# Patient Record
Sex: MALE | Race: WHITE | NOT HISPANIC OR LATINO
[De-identification: names, ages, dates, MRNs, and addresses within clinical notes are randomized per-mention and may not be internally consistent; named-entity substitution may affect disease eponyms.]

---

## 2014-11-14 RX ORDER — METOPROLOL TARTRATE 50 MG
1 TABLET ORAL
Qty: 0 | Refills: 0 | COMMUNITY
Start: 2014-11-14

## 2017-05-02 ENCOUNTER — CLINICAL ADVICE (OUTPATIENT)
Age: 72
End: 2017-05-02

## 2017-05-09 ENCOUNTER — APPOINTMENT (OUTPATIENT)
Dept: CT IMAGING | Facility: CLINIC | Age: 72
End: 2017-05-09

## 2017-05-09 ENCOUNTER — OUTPATIENT (OUTPATIENT)
Dept: OUTPATIENT SERVICES | Facility: HOSPITAL | Age: 72
LOS: 1 days | End: 2017-05-09
Payer: MEDICARE

## 2017-05-09 DIAGNOSIS — Z00.8 ENCOUNTER FOR OTHER GENERAL EXAMINATION: ICD-10-CM

## 2017-05-09 DIAGNOSIS — Z93.0 TRACHEOSTOMY STATUS: Chronic | ICD-10-CM

## 2017-05-09 DIAGNOSIS — I71.01 DISSECTION OF THORACIC AORTA: Chronic | ICD-10-CM

## 2017-05-09 DIAGNOSIS — Z93.2 ILEOSTOMY STATUS: Chronic | ICD-10-CM

## 2017-05-09 PROCEDURE — 71275 CT ANGIOGRAPHY CHEST: CPT

## 2017-05-09 PROCEDURE — 82565 ASSAY OF CREATININE: CPT

## 2017-05-09 PROCEDURE — 74177 CT ABD & PELVIS W/CONTRAST: CPT

## 2017-05-17 DIAGNOSIS — K46.9 UNSPECIFIED ABDOMINAL HERNIA WITHOUT OBSTRUCTION OR GANGRENE: ICD-10-CM

## 2017-05-17 DIAGNOSIS — Z87.74 PERSONAL HISTORY OF (CORRECTED) CONGENITAL MALFORMATIONS OF HEART AND CIRCULATORY SYSTEM: ICD-10-CM

## 2017-06-19 ENCOUNTER — INPATIENT (INPATIENT)
Facility: HOSPITAL | Age: 72
LOS: 2 days | Discharge: HOME | DRG: 287 | End: 2017-06-22
Attending: INTERNAL MEDICINE | Admitting: INTERNAL MEDICINE
Payer: MEDICARE

## 2017-06-19 VITALS
RESPIRATION RATE: 18 BRPM | SYSTOLIC BLOOD PRESSURE: 151 MMHG | WEIGHT: 160.06 LBS | HEART RATE: 77 BPM | TEMPERATURE: 98 F | HEIGHT: 68 IN | OXYGEN SATURATION: 98 % | DIASTOLIC BLOOD PRESSURE: 85 MMHG

## 2017-06-19 DIAGNOSIS — R07.9 CHEST PAIN, UNSPECIFIED: ICD-10-CM

## 2017-06-19 DIAGNOSIS — I71.3 ABDOMINAL AORTIC ANEURYSM, RUPTURED: ICD-10-CM

## 2017-06-19 DIAGNOSIS — Z93.2 ILEOSTOMY STATUS: Chronic | ICD-10-CM

## 2017-06-19 DIAGNOSIS — I71.01 DISSECTION OF THORACIC AORTA: Chronic | ICD-10-CM

## 2017-06-19 DIAGNOSIS — Z93.0 TRACHEOSTOMY STATUS: Chronic | ICD-10-CM

## 2017-06-19 LAB
ALBUMIN SERPL ELPH-MCNC: 3.5 G/DL — SIGNIFICANT CHANGE UP (ref 3.3–5)
ALP SERPL-CCNC: 73 U/L — SIGNIFICANT CHANGE UP (ref 40–120)
ALT FLD-CCNC: 13 U/L RC — SIGNIFICANT CHANGE UP (ref 10–45)
ANION GAP SERPL CALC-SCNC: 13 MMOL/L — SIGNIFICANT CHANGE UP (ref 5–17)
APPEARANCE UR: CLEAR — SIGNIFICANT CHANGE UP
AST SERPL-CCNC: 11 U/L — SIGNIFICANT CHANGE UP (ref 10–40)
BASOPHILS # BLD AUTO: 0 K/UL — SIGNIFICANT CHANGE UP (ref 0–0.2)
BASOPHILS NFR BLD AUTO: 0.2 % — SIGNIFICANT CHANGE UP (ref 0–2)
BILIRUB SERPL-MCNC: 0.4 MG/DL — SIGNIFICANT CHANGE UP (ref 0.2–1.2)
BILIRUB UR-MCNC: NEGATIVE — SIGNIFICANT CHANGE UP
BUN SERPL-MCNC: 25 MG/DL — HIGH (ref 7–23)
CALCIUM SERPL-MCNC: 8.9 MG/DL — SIGNIFICANT CHANGE UP (ref 8.4–10.5)
CHLORIDE SERPL-SCNC: 106 MMOL/L — SIGNIFICANT CHANGE UP (ref 96–108)
CK MB BLD-MCNC: 8 % — HIGH (ref 0–3.5)
CK MB BLD-MCNC: 9.1 % — HIGH (ref 0–3.5)
CK MB CFR SERPL CALC: 3.1 NG/ML — SIGNIFICANT CHANGE UP (ref 0–6.7)
CK MB CFR SERPL CALC: 3.5 NG/ML — SIGNIFICANT CHANGE UP (ref 0–6.7)
CK SERPL-CCNC: 34 U/L — SIGNIFICANT CHANGE UP (ref 30–200)
CK SERPL-CCNC: 44 U/L — SIGNIFICANT CHANGE UP (ref 30–200)
CO2 SERPL-SCNC: 23 MMOL/L — SIGNIFICANT CHANGE UP (ref 22–31)
COLOR SPEC: YELLOW — SIGNIFICANT CHANGE UP
CREAT SERPL-MCNC: 1.21 MG/DL — SIGNIFICANT CHANGE UP (ref 0.5–1.3)
DIFF PNL FLD: NEGATIVE — SIGNIFICANT CHANGE UP
EOSINOPHIL # BLD AUTO: 0 K/UL — SIGNIFICANT CHANGE UP (ref 0–0.5)
EOSINOPHIL NFR BLD AUTO: 0.5 % — SIGNIFICANT CHANGE UP (ref 0–6)
GAS PNL BLDV: SIGNIFICANT CHANGE UP
GLUCOSE SERPL-MCNC: 94 MG/DL — SIGNIFICANT CHANGE UP (ref 70–99)
GLUCOSE UR QL: NEGATIVE — SIGNIFICANT CHANGE UP
HCT VFR BLD CALC: 36.5 % — LOW (ref 39–50)
HGB BLD-MCNC: 11.6 G/DL — LOW (ref 13–17)
KETONES UR-MCNC: NEGATIVE — SIGNIFICANT CHANGE UP
LEUKOCYTE ESTERASE UR-ACNC: NEGATIVE — SIGNIFICANT CHANGE UP
LYMPHOCYTES # BLD AUTO: 2.4 K/UL — SIGNIFICANT CHANGE UP (ref 1–3.3)
LYMPHOCYTES # BLD AUTO: 26 % — SIGNIFICANT CHANGE UP (ref 13–44)
MCHC RBC-ENTMCNC: 26.9 PG — LOW (ref 27–34)
MCHC RBC-ENTMCNC: 31.8 GM/DL — LOW (ref 32–36)
MCV RBC AUTO: 84.4 FL — SIGNIFICANT CHANGE UP (ref 80–100)
MONOCYTES # BLD AUTO: 0.8 K/UL — SIGNIFICANT CHANGE UP (ref 0–0.9)
MONOCYTES NFR BLD AUTO: 8.9 % — SIGNIFICANT CHANGE UP (ref 2–14)
MYOGLOBIN SERPL-MCNC: 57 NG/ML — SIGNIFICANT CHANGE UP (ref 0–116)
NEUTROPHILS # BLD AUTO: 6 K/UL — SIGNIFICANT CHANGE UP (ref 1.8–7.4)
NEUTROPHILS NFR BLD AUTO: 64.4 % — SIGNIFICANT CHANGE UP (ref 43–77)
NITRITE UR-MCNC: NEGATIVE — SIGNIFICANT CHANGE UP
PH UR: 6 — SIGNIFICANT CHANGE UP (ref 5–8)
PLATELET # BLD AUTO: 257 K/UL — SIGNIFICANT CHANGE UP (ref 150–400)
POTASSIUM SERPL-MCNC: 3.6 MMOL/L — SIGNIFICANT CHANGE UP (ref 3.5–5.3)
POTASSIUM SERPL-SCNC: 3.6 MMOL/L — SIGNIFICANT CHANGE UP (ref 3.5–5.3)
PROT SERPL-MCNC: 6.4 G/DL — SIGNIFICANT CHANGE UP (ref 6–8.3)
PROT UR-MCNC: SIGNIFICANT CHANGE UP
RAPID RVP RESULT: SIGNIFICANT CHANGE UP
RBC # BLD: 4.33 M/UL — SIGNIFICANT CHANGE UP (ref 4.2–5.8)
RBC # FLD: 14.8 % — HIGH (ref 10.3–14.5)
RBC CASTS # UR COMP ASSIST: SIGNIFICANT CHANGE UP /HPF (ref 0–2)
SODIUM SERPL-SCNC: 142 MMOL/L — SIGNIFICANT CHANGE UP (ref 135–145)
SP GR SPEC: 1.02 — SIGNIFICANT CHANGE UP (ref 1.01–1.02)
TROPONIN T SERPL-MCNC: <0.01 NG/ML — SIGNIFICANT CHANGE UP (ref 0–0.06)
UROBILINOGEN FLD QL: NEGATIVE — SIGNIFICANT CHANGE UP
WBC # BLD: 9.3 K/UL — SIGNIFICANT CHANGE UP (ref 3.8–10.5)
WBC # FLD AUTO: 9.3 K/UL — SIGNIFICANT CHANGE UP (ref 3.8–10.5)
WBC UR QL: SIGNIFICANT CHANGE UP /HPF (ref 0–5)

## 2017-06-19 PROCEDURE — 71010: CPT | Mod: 26

## 2017-06-19 PROCEDURE — 74174 CTA ABD&PLVS W/CONTRAST: CPT | Mod: 26

## 2017-06-19 PROCEDURE — 71275 CT ANGIOGRAPHY CHEST: CPT | Mod: 26

## 2017-06-19 PROCEDURE — 93010 ELECTROCARDIOGRAM REPORT: CPT

## 2017-06-19 PROCEDURE — 99285 EMERGENCY DEPT VISIT HI MDM: CPT | Mod: 25,GC

## 2017-06-19 RX ORDER — ASPIRIN/CALCIUM CARB/MAGNESIUM 324 MG
324 TABLET ORAL ONCE
Qty: 0 | Refills: 0 | Status: COMPLETED | OUTPATIENT
Start: 2017-06-19 | End: 2017-06-19

## 2017-06-19 RX ORDER — HEPARIN SODIUM 5000 [USP'U]/ML
5000 INJECTION INTRAVENOUS; SUBCUTANEOUS EVERY 12 HOURS
Qty: 0 | Refills: 0 | Status: DISCONTINUED | OUTPATIENT
Start: 2017-06-19 | End: 2017-06-22

## 2017-06-19 RX ORDER — PANTOPRAZOLE SODIUM 20 MG/1
40 TABLET, DELAYED RELEASE ORAL
Qty: 0 | Refills: 0 | Status: DISCONTINUED | OUTPATIENT
Start: 2017-06-19 | End: 2017-06-22

## 2017-06-19 RX ORDER — ASPIRIN/CALCIUM CARB/MAGNESIUM 324 MG
81 TABLET ORAL DAILY
Qty: 0 | Refills: 0 | Status: DISCONTINUED | OUTPATIENT
Start: 2017-06-19 | End: 2017-06-22

## 2017-06-19 RX ORDER — METOPROLOL TARTRATE 50 MG
25 TABLET ORAL
Qty: 0 | Refills: 0 | Status: DISCONTINUED | OUTPATIENT
Start: 2017-06-19 | End: 2017-06-22

## 2017-06-19 RX ORDER — ALLOPURINOL 300 MG
100 TABLET ORAL DAILY
Qty: 0 | Refills: 0 | Status: DISCONTINUED | OUTPATIENT
Start: 2017-06-19 | End: 2017-06-22

## 2017-06-19 RX ORDER — DOXAZOSIN MESYLATE 4 MG
8 TABLET ORAL AT BEDTIME
Qty: 0 | Refills: 0 | Status: DISCONTINUED | OUTPATIENT
Start: 2017-06-19 | End: 2017-06-22

## 2017-06-19 RX ORDER — FINASTERIDE 5 MG/1
5 TABLET, FILM COATED ORAL DAILY
Qty: 0 | Refills: 0 | Status: DISCONTINUED | OUTPATIENT
Start: 2017-06-19 | End: 2017-06-22

## 2017-06-19 RX ADMIN — Medication 8 MILLIGRAM(S): at 22:24

## 2017-06-19 RX ADMIN — HEPARIN SODIUM 5000 UNIT(S): 5000 INJECTION INTRAVENOUS; SUBCUTANEOUS at 22:23

## 2017-06-19 RX ADMIN — FINASTERIDE 5 MILLIGRAM(S): 5 TABLET, FILM COATED ORAL at 22:24

## 2017-06-19 RX ADMIN — Medication 100 MILLIGRAM(S): at 22:24

## 2017-06-19 NOTE — H&P ADULT - ASSESSMENT
pt w/ mult medical issues w/ [facility]  cts noted  no acute vascular issue as per vascular  cards abida   trops  tele  cont b blockers  dvt proph  asa daily  spoke w/ with only few meds now  pt abida

## 2017-06-19 NOTE — ED ADULT NURSE REASSESSMENT NOTE - NS ED NURSE REASSESS COMMENT FT1
1035 patient requesting to use bathroom at this time. Unable to perform EKG at this time.
1730 pt reassessed . Has no N//V/D  Pt C/O dull chest discomfort pt is admitted  has no bed  Continue to monitor  Pt is stable & comfortable at this time of care
received report from day, RN. patient restinf comfortably in bed in no acute distress and denies pain at this time. VSS. waiting for bed,

## 2017-06-19 NOTE — CONSULT NOTE ADULT - ASSESSMENT
73 yo man with atypical chest pain likely musculoskeletal in nature but he did complain of diaphoresis with the chest pain.

## 2017-06-19 NOTE — H&P ADULT - HISTORY OF PRESENT ILLNESS
68 y/o M with PMH of HTN, gout, seizures, BPH, hypothyroid, ruptured AAA presents with 3 days of constant, pressure-like, chest pain, 6/10, radiating to his right side, nonexertional, nonpleuritic. No shortness of breath, no diaphoresis, no edema, no palptiations. Patient has R sided residual deficits from prior cva.

## 2017-06-19 NOTE — H&P ADULT - PSH
Ascending aortic dissection  s/p repair on 8/19/2013  History of tracheostomy  on 8/2013 with decannulation  S/P ileostomy  exploratory laparotomy, creation of ileostomy and PEG on 8/24/13 due to ischemic bowel

## 2017-06-19 NOTE — H&P ADULT - NSHPLABSRESULTS_GEN_ALL_CORE
TELEMETRY: 	    ECG:  	  RADIOLOGY:  OTHER: 	  	  LABS:	 	    CARDIAC MARKERS:  CARDIAC MARKERS ( 19 Jun 2017 12:15 )  x     / <0.01 ng/mL / 34 U/L / x     / 3.1 ng/mL                                11.6   9.3   )-----------( 257      ( 19 Jun 2017 12:15 )             36.5     06-19    142  |  106  |  25<H>  ----------------------------<  94  3.6   |  23  |  1.21    Ca    8.9      19 Jun 2017 12:15    TPro  6.4  /  Alb  3.5  /  TBili  0.4  /  DBili  x   /  AST  11  /  ALT  13  /  AlkPhos  73  06-19    proBNP:   Lipid Profile:   HgA1c:   TSH:

## 2017-06-19 NOTE — ED ADULT NURSE NOTE - PMH
Anoxic brain injury    Aortic aneurysm and dissection  type A with rupture 8/2013  Aortic aneurysm rupture  with residual Type B dissection  Aortic aneurysm, abdominal    BPH (Benign Prostatic Hyperplasia)    CVA (cerebral infarction)  with RUE weakness  Gastrostomy in place  surgical gastrostomy, NOT a PEG  Gout    HTN - Hypertension    Hypothyroid    Ischemic bowel disease  s/p exploratory laparotomy and creation of ileostomy 8/2013  Seizures  8/2014 on keppra last EEG - no activity

## 2017-06-19 NOTE — H&P ADULT - FAMILY HISTORY
<<-----Click on this checkbox to enter Family History Family history of stroke     Father  Still living? Unknown  Family history of diabetes mellitus type II, Age at diagnosis: Age Unknown
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
## 2017-06-19 NOTE — ED PROVIDER NOTE - OBJECTIVE STATEMENT
68 y/o M with PMH of HTN, gout, seizures, BPH, hypothyroid, ruptured AAA presents with 3 days of constant, pressurelike, chest pain, 6/10, radiating to his right side, nonexertional, nonpleuritic. No shortness of breath, no diaphoresis, no edema, no palptiations. Patient has R sided residual dficits from prior cva. 68 y/o M with PMH of HTN, gout, seizures, BPH, hypothyroid, ruptured AAA presents with 3 days of constant, pressure-like, chest pain, 6/10, radiating to his right side, nonexertional, nonpleuritic. No shortness of breath, no diaphoresis, no edema, no palptiations. Patient has R sided residual deficits from prior cva. 70 y/o M with PMH of HTN, gout, seizures, BPH, hypothyroid, ruptured AAA presents with 3 days of constant, pressure-like, chest pain, 6/10, radiating to his right side, nonexertional, nonpleuritic. No shortness of breath, no diaphoresis, no edema, no palptiations. Patient has R sided residual deficits from prior cva.    PMD: Zion Torres.

## 2017-06-19 NOTE — ED ADULT NURSE NOTE - OBJECTIVE STATEMENT
Pt is here C/O  chest pain X 2 days Pain in the left side of the chest not radiating. Pt is  taking prednisone 4 mg daily . [place] fever chills N/V/D  +REVELES   pt ambulates with steady gait. Afebrile here awaiting for Ed Eval  . Pt is sinus rhythm on monitor 77 /mt. Pt is slow with talk & that is his baseline

## 2017-06-19 NOTE — CONSULT NOTE ADULT - PROBLEM SELECTOR RECOMMENDATION 9
Continue to monitor on telemetry.  ECG in AM.  Cardiac isoenzymes x3.  Recommend an exercise stress nuclear study to evaluate source of chest pain and assess ischemia.  Low likelihood of CAD

## 2017-06-20 DIAGNOSIS — R13.10 DYSPHAGIA, UNSPECIFIED: ICD-10-CM

## 2017-06-20 LAB
ANION GAP SERPL CALC-SCNC: 15 MMOL/L — SIGNIFICANT CHANGE UP (ref 5–17)
BUN SERPL-MCNC: 24 MG/DL — HIGH (ref 7–23)
CALCIUM SERPL-MCNC: 8.8 MG/DL — SIGNIFICANT CHANGE UP (ref 8.4–10.5)
CHLORIDE SERPL-SCNC: 103 MMOL/L — SIGNIFICANT CHANGE UP (ref 96–108)
CK MB CFR SERPL CALC: 2.9 NG/ML — SIGNIFICANT CHANGE UP (ref 0–6.7)
CK SERPL-CCNC: 42 U/L — SIGNIFICANT CHANGE UP (ref 30–200)
CO2 SERPL-SCNC: 23 MMOL/L — SIGNIFICANT CHANGE UP (ref 22–31)
CREAT SERPL-MCNC: 1.11 MG/DL — SIGNIFICANT CHANGE UP (ref 0.5–1.3)
CULTURE RESULTS: NO GROWTH — SIGNIFICANT CHANGE UP
GLUCOSE SERPL-MCNC: 82 MG/DL — SIGNIFICANT CHANGE UP (ref 70–99)
HCT VFR BLD CALC: 33.9 % — LOW (ref 39–50)
HGB BLD-MCNC: 10.6 G/DL — LOW (ref 13–17)
MCHC RBC-ENTMCNC: 25.5 PG — LOW (ref 27–34)
MCHC RBC-ENTMCNC: 31.3 GM/DL — LOW (ref 32–36)
MCV RBC AUTO: 81.5 FL — SIGNIFICANT CHANGE UP (ref 80–100)
MYOGLOBIN SERPL-MCNC: 40 NG/ML — SIGNIFICANT CHANGE UP (ref 16–96)
PLATELET # BLD AUTO: 274 K/UL — SIGNIFICANT CHANGE UP (ref 150–400)
POTASSIUM SERPL-MCNC: 3.9 MMOL/L — SIGNIFICANT CHANGE UP (ref 3.5–5.3)
POTASSIUM SERPL-SCNC: 3.9 MMOL/L — SIGNIFICANT CHANGE UP (ref 3.5–5.3)
RBC # BLD: 4.16 M/UL — LOW (ref 4.2–5.8)
RBC # FLD: 16.1 % — HIGH (ref 10.3–14.5)
SODIUM SERPL-SCNC: 141 MMOL/L — SIGNIFICANT CHANGE UP (ref 135–145)
SPECIMEN SOURCE: SIGNIFICANT CHANGE UP
TROPONIN T SERPL-MCNC: <0.01 NG/ML — SIGNIFICANT CHANGE UP (ref 0–0.06)
WBC # BLD: 8.1 K/UL — SIGNIFICANT CHANGE UP (ref 3.8–10.5)
WBC # FLD AUTO: 8.1 K/UL — SIGNIFICANT CHANGE UP (ref 3.8–10.5)

## 2017-06-20 PROCEDURE — 93018 CV STRESS TEST I&R ONLY: CPT

## 2017-06-20 PROCEDURE — 78452 HT MUSCLE IMAGE SPECT MULT: CPT | Mod: 26

## 2017-06-20 PROCEDURE — 93016 CV STRESS TEST SUPVJ ONLY: CPT

## 2017-06-20 RX ORDER — LEVOTHYROXINE SODIUM 125 MCG
25 TABLET ORAL DAILY
Qty: 0 | Refills: 0 | Status: DISCONTINUED | OUTPATIENT
Start: 2017-06-20 | End: 2017-06-22

## 2017-06-20 RX ADMIN — Medication 25 MILLIGRAM(S): at 06:31

## 2017-06-20 RX ADMIN — HEPARIN SODIUM 5000 UNIT(S): 5000 INJECTION INTRAVENOUS; SUBCUTANEOUS at 18:18

## 2017-06-20 RX ADMIN — PANTOPRAZOLE SODIUM 40 MILLIGRAM(S): 20 TABLET, DELAYED RELEASE ORAL at 06:31

## 2017-06-20 RX ADMIN — Medication 25 MILLIGRAM(S): at 18:18

## 2017-06-20 RX ADMIN — HEPARIN SODIUM 5000 UNIT(S): 5000 INJECTION INTRAVENOUS; SUBCUTANEOUS at 06:31

## 2017-06-20 RX ADMIN — Medication 100 MILLIGRAM(S): at 18:18

## 2017-06-20 RX ADMIN — Medication 81 MILLIGRAM(S): at 12:04

## 2017-06-20 RX ADMIN — FINASTERIDE 5 MILLIGRAM(S): 5 TABLET, FILM COATED ORAL at 22:04

## 2017-06-20 RX ADMIN — Medication 100 MILLIGRAM(S): at 12:04

## 2017-06-20 RX ADMIN — Medication 8 MILLIGRAM(S): at 22:04

## 2017-06-20 NOTE — PHYSICAL THERAPY INITIAL EVALUATION ADULT - PERTINENT HX OF CURRENT PROBLEM, REHAB EVAL
69yoM, pmxhe below. p/w 3 days of constant, pressure-like, chest pain, 6/10, radiating to his right side, nonexertional, nonpleuritic. No shortness of breath, no diaphoresis, no edema, no palpitations. Patient has R sided residual deficits from prior cva.

## 2017-06-20 NOTE — PHYSICAL THERAPY INITIAL EVALUATION ADULT - ADDITIONAL COMMENTS
CT angio Patient is status post surgical repair of a type A thoracic aortic dissection. Persistent intimal flap extending from the aortic arch to the level of the distal aortic bifurcation.   As per pt and wife at bedside, pt lives with wife in a private house, 4-5 steps to enter, 1 flight of stairs inside house for bedroom access. Pt can stay on first floor if need be. As per wife, she is always available to assist pt as needed. Pt owns shower chair, commode, wheelchair. PTA, pt ambulatory with no AD.

## 2017-06-20 NOTE — CONSULT NOTE ADULT - SUBJECTIVE AND OBJECTIVE BOX
CC: Throat pain and dysphagia    HPI: 71 y/o M with PMH of HTN, gout, seizures, BPH, hypothyroid, CVA and ruptured AAA admitted for chest pain work up. ENT consulted to evaluate for intermittent throat pain and dysphagia x 2 weeks. Pt states pain suddenly comes and goes every few days. Pt states he has difficulty swallowing liquids and solids. Increased difficultly swallowing liquids. Gargling helps at times. Pt has tried pain killers without any temporarily relief. Pt denies any changes in voice, weight loss, fevers, N/V, SOB, otalgia, rhinorrhea, or nasal congestion.      PAST MEDICAL & SURGICAL HISTORY:  Gastrostomy in place: surgical gastrostomy, NOT a PEG  PEG (percutaneous endoscopic gastrostomy) status  Ischemic bowel disease: s/p exploratory laparotomy and creation of ileostomy 8/2013  Hypothyroid  Seizures: 8/2014 on keppra last EEG - no activity  CVA (cerebral infarction): with RUE weakness  Anoxic brain injury  Aortic aneurysm rupture: with residual Type B dissection  Aortic aneurysm, abdominal  Aortic aneurysm and dissection: type A with rupture 8/2013  Gout  BPH (Benign Prostatic Hyperplasia)  HTN - Hypertension  History of tracheostomy: on 8/2013 with decannulation  Ascending aortic dissection: s/p repair on 8/19/2013  S/P ileostomy: exploratory laparotomy, creation of ileostomy and PEG on 8/24/13 due to ischemic bowel    Allergies    penicillin (Pruritus)    Intolerances      MEDICATIONS  (STANDING):  finasteride 5milliGRAM(s) Oral daily  doxazosin 8milliGRAM(s) Oral at bedtime  heparin  Injectable 5000Unit(s) SubCutaneous every 12 hours  aspirin enteric coated 81milliGRAM(s) Oral daily  metoprolol 25milliGRAM(s) Oral two times a day  allopurinol 100milliGRAM(s) Oral daily  pantoprazole    Tablet 40milliGRAM(s) Oral before breakfast  levothyroxine 25MICROGram(s) Oral daily  doxycycline hyclate Capsule 100milliGRAM(s) Oral every 12 hours    MEDICATIONS  (PRN):    Social History: No history of tobacco use, EtOH use, illicit drugs    ROS: ENT, GI, , CV, Pulm, Neuro, Psych, MS, Heme, Endo, Constitutional all negative except as noted in HPI    Vital Signs Last 24 Hrs  T(C): 36.7, Max: 36.7 (06-20 @ 21:54)  T(F): 98.1, Max: 98.1 (06-20 @ 21:54)  HR: 58 (58 - 76)  BP: 107/64 (104/63 - 133/78)  BP(mean): --  RR: 18 (17 - 18)  SpO2: 96% (95% - 98%)                          10.6   8.10  )-----------( 274      ( 20 Jun 2017 09:28 )             33.9    06-20    141  |  103  |  24<H>  ----------------------------<  82  3.9   |  23  |  1.11    Ca    8.8      20 Jun 2017 07:45    TPro  6.4  /  Alb  3.5  /  TBili  0.4  /  DBili  x   /  AST  11  /  ALT  13  /  AlkPhos  73  06-19       PHYSICAL EXAM:  Gen: Awake and alert, sitting up, well-developed, NAD  Head: Normocephalic, Atraumatic  Face: no edema/erythema/fluctuance, parotid glands soft without mass  Eyes: PERRL, EOMI, no scleral injection  Ears: Right - ear canal cerumen, TM intact without effusion. No erythema or edema            Left - ear canal cerumen, TM intact without effusion. No erythema or edema  Nose: Nares bilaterally patent, no discharge, edema, erythema  Mouth: Mucosa moist, tongue/uvula midline, oropharynx clear. FOM- soft, non tender.  Neck: Flat, supple, no lymphadenopathy, trachea midline, no masses  Resp: breathing easily, no stridor  CV: no peripheral edema/cyanosis CC: Throat pain and dysphagia    HPI: 71 y/o M with PMH of HTN, gout, seizures, BPH, hypothyroid, trach placement/decannulation(2013), CVA and ruptured AAA admitted for chest pain work up. ENT consulted to evaluate for intermittent throat pain and dysphagia x 2 weeks. Pt states pain suddenly comes and goes every few days. Pt states he has difficulty swallowing liquids and solids. Increased difficultly swallowing liquids. Gargling helps at times. Pt has tried pain killers without any temporarily relief. Pt denies any changes in voice, weight loss, fevers, N/V, SOB, otalgia, rhinorrhea, or nasal congestion.      PAST MEDICAL & SURGICAL HISTORY:  Gastrostomy in place: surgical gastrostomy, NOT a PEG  PEG (percutaneous endoscopic gastrostomy) status  Ischemic bowel disease: s/p exploratory laparotomy and creation of ileostomy 8/2013  Hypothyroid  Seizures: 8/2014 on keppra last EEG - no activity  CVA (cerebral infarction): with RUE weakness  Anoxic brain injury  Aortic aneurysm rupture: with residual Type B dissection  Aortic aneurysm, abdominal  Aortic aneurysm and dissection: type A with rupture 8/2013  Gout  BPH (Benign Prostatic Hyperplasia)  HTN - Hypertension  History of tracheostomy: on 8/2013 with decannulation  Ascending aortic dissection: s/p repair on 8/19/2013  S/P ileostomy: exploratory laparotomy, creation of ileostomy and PEG on 8/24/13 due to ischemic bowel    Allergies    penicillin (Pruritus)    Intolerances      MEDICATIONS  (STANDING):  finasteride 5milliGRAM(s) Oral daily  doxazosin 8milliGRAM(s) Oral at bedtime  heparin  Injectable 5000Unit(s) SubCutaneous every 12 hours  aspirin enteric coated 81milliGRAM(s) Oral daily  metoprolol 25milliGRAM(s) Oral two times a day  allopurinol 100milliGRAM(s) Oral daily  pantoprazole    Tablet 40milliGRAM(s) Oral before breakfast  levothyroxine 25MICROGram(s) Oral daily  doxycycline hyclate Capsule 100milliGRAM(s) Oral every 12 hours    MEDICATIONS  (PRN):    Social History: No history of tobacco use, EtOH use, illicit drugs    ROS: ENT, GI, , CV, Pulm, Neuro, Psych, MS, Heme, Endo, Constitutional all negative except as noted in HPI    Vital Signs Last 24 Hrs  T(C): 36.7, Max: 36.7 (06-20 @ 21:54)  T(F): 98.1, Max: 98.1 (06-20 @ 21:54)  HR: 58 (58 - 76)  BP: 107/64 (104/63 - 133/78)  BP(mean): --  RR: 18 (17 - 18)  SpO2: 96% (95% - 98%)                          10.6   8.10  )-----------( 274      ( 20 Jun 2017 09:28 )             33.9    06-20    141  |  103  |  24<H>  ----------------------------<  82  3.9   |  23  |  1.11    Ca    8.8      20 Jun 2017 07:45    TPro  6.4  /  Alb  3.5  /  TBili  0.4  /  DBili  x   /  AST  11  /  ALT  13  /  AlkPhos  73  06-19       PHYSICAL EXAM:  Gen: Awake and alert, sitting up, well-developed, NAD  Head: Normocephalic, Atraumatic  Face: no edema/erythema/fluctuance, parotid glands soft without mass  Eyes: PERRL, EOMI, no scleral injection  Ears: Right - ear canal cerumen present, TM intact without effusion. No erythema or edema            Left - ear canal cerumen present, TM intact without effusion. No erythema or edema  Nose: Nares bilaterally patent, no discharge, edema, erythema  Mouth: Mucosa moist, tongue/uvula midline, oropharynx clear. FOM- soft, non tender.  Neck: Flat, supple, no lymphadenopathy, trachea midline, no masses  Resp: breathing easily, no stridor  CV: no peripheral edema/cyanosis CC: Throat pain and dysphagia    HPI: 71 y/o M with PMH of HTN, gout, seizures, BPH, hypothyroid, trach placement/decannulation(2013), CVA and ruptured AAA admitted for chest pain work up. ENT consulted to evaluate for intermittent throat pain and dysphagia x 2 weeks. Pt states pain suddenly comes and goes every few days. Pt states he has difficulty swallowing liquids and solids although his wife reports he is eating all his meals fine and not complaining. Gargling helps at times. Hs wife notes he has been c/o heartburn after meals and the pain seems to come some time after meals. He also often will eat then lay down immediately. Pt has tried pain killers without any relief. Pt denies any changes in voice, weight loss, fevers, N/V, SOB, otalgia, rhinorrhea, or nasal congestion.    PAST MEDICAL & SURGICAL HISTORY:  Ischemic bowel disease: s/p exploratory laparotomy and creation of ileostomy 8/2013  Hypothyroid  Seizures: 8/2014 on keppra last EEG - no activity  CVA (cerebral infarction): with RUE weakness  Anoxic brain injury  Aortic aneurysm rupture: with residual Type B dissection  Aortic aneurysm, abdominal  Aortic aneurysm and dissection: type A with rupture 8/2013  Gout  BPH (Benign Prostatic Hyperplasia)  HTN - Hypertension  History of tracheostomy: on 8/2013 with decannulation  Ascending aortic dissection: s/p repair on 8/19/2013  S/P ileostomy: exploratory laparotomy, creation of ileostomy and PEG on 8/24/13 due to ischemic bowel    Allergies    penicillin (Pruritus)    Intolerances    MEDICATIONS  (STANDING):  finasteride 5milliGRAM(s) Oral daily  doxazosin 8milliGRAM(s) Oral at bedtime  heparin  Injectable 5000Unit(s) SubCutaneous every 12 hours  aspirin enteric coated 81milliGRAM(s) Oral daily  metoprolol 25milliGRAM(s) Oral two times a day  allopurinol 100milliGRAM(s) Oral daily  pantoprazole    Tablet 40milliGRAM(s) Oral before breakfast  levothyroxine 25MICROGram(s) Oral daily  doxycycline hyclate Capsule 100milliGRAM(s) Oral every 12 hours    MEDICATIONS  (PRN):    Social History: No history of tobacco use, EtOH use, illicit drugs    ROS: ENT, GI, , CV, Pulm, Neuro, Psych, MS, Heme, Endo, Constitutional all negative except as noted in HPI    Vital Signs Last 24 Hrs  T(C): 36.7, Max: 36.7 (06-20 @ 21:54)  T(F): 98.1, Max: 98.1 (06-20 @ 21:54)  HR: 58 (58 - 76)  BP: 107/64 (104/63 - 133/78)  BP(mean): --  RR: 18 (17 - 18)  SpO2: 96% (95% - 98%)                          10.6   8.10  )-----------( 274      ( 20 Jun 2017 09:28 )             33.9    06-20    141  |  103  |  24<H>  ----------------------------<  82  3.9   |  23  |  1.11    Ca    8.8      20 Jun 2017 07:45    TPro  6.4  /  Alb  3.5  /  TBili  0.4  /  DBili  x   /  AST  11  /  ALT  13  /  AlkPhos  73  06-19       PHYSICAL EXAM:  Gen: Awake and alert, sitting up, well-developed, NAD  Head: Normocephalic, Atraumatic  Face: no edema/erythema/fluctuance, parotid glands soft without mass  Eyes: PERRL, EOMI, no scleral injection  Ears: Right - ear canal cerumen present, TM intact without effusion. No erythema or edema            Left - ear canal cerumen present, TM intact without effusion. No erythema or edema  Nose: Nares bilaterally patent, no discharge, edema, erythema  Mouth: Mucosa moist, tongue/uvula midline, oropharynx clear. FOM- soft, non tender.  Neck: Flat, supple, no lymphadenopathy, trachea midline with well healed tracheotomy scar, no masses  Resp: breathing easily, no stridor  CV: no peripheral edema/cyanosis

## 2017-06-20 NOTE — PROGRESS NOTE ADULT - SUBJECTIVE AND OBJECTIVE BOX
SUBJECTIVE: Patient still having CP this morning which has been constant  	  MEDICATIONS:  doxazosin 8milliGRAM(s) Oral at bedtime  metoprolol 25milliGRAM(s) Oral two times a day          pantoprazole    Tablet 40milliGRAM(s) Oral before breakfast    finasteride 5milliGRAM(s) Oral daily  allopurinol 100milliGRAM(s) Oral daily    heparin  Injectable 5000Unit(s) SubCutaneous every 12 hours  aspirin enteric coated 81milliGRAM(s) Oral daily      REVIEW OF SYSTEMS:    CONSTITUTIONAL: No fever, weight loss, or fatigue  EYES: No eye pain, visual disturbances, or discharge  NECK: No pain or stiffness  RESPIRATORY: No cough, wheezing, chills or hemoptysis; No Shortness of Breath  CARDIOVASCULAR: No palpitations, dizziness, or leg swelling  GASTROINTESTINAL: No abdominal or epigastric pain. No nausea, vomiting, or hematemesis; No diarrhea or constipation. No melena or hematochezia.  GENITOURINARY: No dysuria, frequency, hematuria, or incontinence  NEUROLOGICAL: No headaches, memory loss, loss of strength, numbness, or tremors  SKIN: No itching, burning, rashes, or lesions   LYMPH Nodes: No enlarged glands  MUSCULOSKELETAL: No joint pain or swelling; No muscle, back, or extremity pain  All other review of systems are negative.      PHYSICAL EXAM:  T(C): 36.4, Max: 36.9 (06-19 @ 20:53)  HR: 70 (58 - 78)  BP: 133/78 (104/63 - 151/85)  RR: 18 (18 - 20)  SpO2: 95% (95% - 98%)  Wt(kg): --  I&O's Summary    I & Os for current day (as of 20 Jun 2017 08:02)  =============================================  IN: 120 ml / OUT: 0 ml / NET: 120 ml    Height (cm): 170.2 (06-19 @ 20:53)  Weight (kg): 72.8 (06-19 @ 20:53)  BMI (kg/m2): 25.1 (06-19 @ 20:53)  BSA (m2): 1.84 (06-19 @ 20:53)    PHYSICAL EXAM    Appearance: Normal	  HEENT:   Normal oral mucosa, PERRL, EOMI	  NECK: Soft and supple, No LAD, No JVD  Cardiovascular: Regular Rate and Rhythm, Normal S1 S2, No murmurs, No clicks, gallops or rubs  Respiratory: Lungs clear to auscultation	  Gastrointestinal:  Soft, Non-tender, + BS	  Skin: No rashes, No ecchymoses, No cyanosis  Neurologic: Non-focal  Extremities: No clubbing, cyanosis or edema  Vascular: Peripheral pulses palpable 2+ bilaterally    TELEMETRY: 	 NSR   ECG:  	  RADIOLOGY  DIAGNOSTIC TESTING:    [ ] Stress Test: Pending  	    LABS:	 	    CARDIAC MARKERS:    Troponin <0.01 x 3                          11.6   9.3   )-----------( 257      ( 19 Jun 2017 12:15 )             36.5     06-19    142  |  106  |  25<H>  ----------------------------<  94  3.6   |  23  |  1.21    Ca    8.9      19 Jun 2017 12:15    TPro  6.4  /  Alb  3.5  /  TBili  0.4  /  DBili  x   /  AST  11  /  ALT  13  /  AlkPhos  73  06-19

## 2017-06-20 NOTE — PROGRESS NOTE ADULT - ASSESSMENT
73 yo M as above:  1. Chest pain - improved, ACS ruled out, NST today, c/w ASA, BB, f/u Cardio  2. Seizure d/o - c/w Keppra  3. Throat pain - started on Doxycyclin as outpt, will continue course, get ENT eval as not improving  4. Hypothyroid - c/w Synthroid  5. HTN - BP at goal  6. BPH - continue therapy  7. DVT prophylaxis

## 2017-06-20 NOTE — PROGRESS NOTE ADULT - SUBJECTIVE AND OBJECTIVE BOX
CHIEF COMPLAINT:Patient is a 72y old  Male who presents with a chief complaint of Chest pain (19 Jun 2017 20:57)    	  Interval history: chest pain improved, but c/o throat pain which was present for 2 weeks for which he was receiving abx as outpt without improvement      Allergies:  penicillin (Pruritus)      PAST MEDICAL & SURGICAL HISTORY:  Gastrostomy in place: surgical gastrostomy, NOT a PEG  PEG (percutaneous endoscopic gastrostomy) status  Ischemic bowel disease: s/p exploratory laparotomy and creation of ileostomy 8/2013  Hypothyroid  Seizures: 8/2014 on keppra last EEG - no activity  CVA (cerebral infarction): with RUE weakness  Anoxic brain injury  Aortic aneurysm rupture: with residual Type B dissection  Aortic aneurysm, abdominal  Aortic aneurysm and dissection: type A with rupture 8/2013  Gout  BPH (Benign Prostatic Hyperplasia)  HTN - Hypertension  History of tracheostomy: on 8/2013 with decannulation  Ascending aortic dissection: s/p repair on 8/19/2013  S/P ileostomy: exploratory laparotomy, creation of ileostomy and PEG on 8/24/13 due to ischemic bowel      FAMILY HISTORY:  Family history of stroke  Family history of diabetes mellitus type II (Father)      REVIEW OF SYSTEMS:  CONSTITUTIONAL: No fever, weight loss, or fatigue  EYES: No eye pain, visual disturbances, or discharge  NECK: No pain or stiffness  RESPIRATORY: No cough or wheezing, no shortness of breath, c/o sore throat  CARDIOVASCULAR: chest pain improved, no palpitations, dizziness, or leg swelling  GASTROINTESTINAL: No abdominal or epigastric pain. No nausea, vomiting, diarrhea or constipation  GENITOURINARY: No dysuria, urinary frequency or urgency, no hematuria  NEUROLOGICAL: No headaches, memory loss, loss of strength, numbness, or tremors  SKIN: No itching, burning, rashes, or lesions   MUSCULOSKELETAL: No joint pain or swelling; No muscle, back, or extremity pain    Medications:  MEDICATIONS  (STANDING):  finasteride 5milliGRAM(s) Oral daily  doxazosin 8milliGRAM(s) Oral at bedtime  heparin  Injectable 5000Unit(s) SubCutaneous every 12 hours  aspirin enteric coated 81milliGRAM(s) Oral daily  metoprolol 25milliGRAM(s) Oral two times a day  allopurinol 100milliGRAM(s) Oral daily  pantoprazole    Tablet 40milliGRAM(s) Oral before breakfast    MEDICATIONS  (PRN):    	    PHYSICAL EXAM:  T(C): 36.5, Max: 36.9 (06-19 @ 20:53)  HR: 59 (58 - 78)  BP: 114/64 (104/63 - 140/80)  RR: 17 (17 - 20)  SpO2: 98% (95% - 98%)  Wt(kg): --  I&O's Summary  I & Os for 24h ending 20 Jun 2017 07:00  =============================================  IN: 120 ml / OUT: 0 ml / NET: 120 ml    I & Os for current day (as of 20 Jun 2017 15:16)  =============================================  IN: 840 ml / OUT: 0 ml / NET: 840 ml      Appearance: Normal	  HEENT:   NCAT, PERRL, EOMI	  Lymphatic: No lymphadenopathy  Cardiovascular: Normal S1 S2, RRR  Respiratory: Lungs clear to auscultation BL  Psychiatry: A & O x 3, Mood & affect appropriate  Gastrointestinal:  Soft, Non-tender, + BS  Skin: No rashes, No ecchymoses, No cyanosis	  Neurologic: Non-focal  Extremities: Normal range of motion, No clubbing, cyanosis or edema    	  LABS:	 	    CARDIAC MARKERS:  CARDIAC MARKERS ( 20 Jun 2017 07:45 )  x     / <0.01 ng/mL / 42 U/L / x     / 2.9 ng/mL  CARDIAC MARKERS ( 19 Jun 2017 20:08 )  x     / <0.01 ng/mL / 44 U/L / x     / 3.5 ng/mL  CARDIAC MARKERS ( 19 Jun 2017 16:56 )  x     / <0.01 ng/mL / x     / x     / x      CARDIAC MARKERS ( 19 Jun 2017 12:15 )  x     / <0.01 ng/mL / 34 U/L / x     / 3.1 ng/mL                                10.6   8.10  )-----------( 274      ( 20 Jun 2017 09:28 )             33.9     06-20    141  |  103  |  24<H>  ----------------------------<  82  3.9   |  23  |  1.11    Ca    8.8      20 Jun 2017 07:45    TPro  6.4  /  Alb  3.5  /  TBili  0.4  /  DBili  x   /  AST  11  /  ALT  13  /  AlkPhos  73  06-19

## 2017-06-20 NOTE — CONSULT NOTE ADULT - ATTENDING COMMENTS
Patient seen and examined. Scope #2 used. Nasal passages clear although with bilateral septal spurs so could not tolerate passing laryngoscope nasally. Able to pass orally and oropharynx/hypopharynx clear. Vocal cords fully mobile with no masses or lesions. Postcricoid area clear without erythema or edema. Visualized subglottis clear.     Discussed symptoms with patient and his wife and his symptoms are consistent with discomfort from reflux. Recommend continue pantoprazole 40mg daily before breakfast and if with persistent breakthrough heartburn he should have GI eval as outpatient for management. May f/u with ENT as an outpatient if symptoms do not resolve with 1 month of anti-reflux meds and reflux precautions/lifestyle changes.

## 2017-06-21 PROCEDURE — 31575 DIAGNOSTIC LARYNGOSCOPY: CPT

## 2017-06-21 PROCEDURE — 99222 1ST HOSP IP/OBS MODERATE 55: CPT | Mod: 25

## 2017-06-21 PROCEDURE — 93010 ELECTROCARDIOGRAM REPORT: CPT

## 2017-06-21 RX ORDER — AMLODIPINE BESYLATE 2.5 MG/1
2.5 TABLET ORAL ONCE
Qty: 0 | Refills: 0 | Status: COMPLETED | OUTPATIENT
Start: 2017-06-21 | End: 2017-06-21

## 2017-06-21 RX ADMIN — Medication 25 MILLIGRAM(S): at 17:48

## 2017-06-21 RX ADMIN — Medication 100 MILLIGRAM(S): at 12:18

## 2017-06-21 RX ADMIN — Medication 100 MILLIGRAM(S): at 17:48

## 2017-06-21 RX ADMIN — Medication 8 MILLIGRAM(S): at 21:54

## 2017-06-21 RX ADMIN — FINASTERIDE 5 MILLIGRAM(S): 5 TABLET, FILM COATED ORAL at 21:54

## 2017-06-21 RX ADMIN — Medication 100 MILLIGRAM(S): at 05:58

## 2017-06-21 RX ADMIN — Medication 25 MICROGRAM(S): at 05:58

## 2017-06-21 RX ADMIN — HEPARIN SODIUM 5000 UNIT(S): 5000 INJECTION INTRAVENOUS; SUBCUTANEOUS at 05:58

## 2017-06-21 RX ADMIN — Medication 25 MILLIGRAM(S): at 06:16

## 2017-06-21 RX ADMIN — Medication 81 MILLIGRAM(S): at 12:18

## 2017-06-21 RX ADMIN — AMLODIPINE BESYLATE 2.5 MILLIGRAM(S): 2.5 TABLET ORAL at 15:25

## 2017-06-21 RX ADMIN — PANTOPRAZOLE SODIUM 40 MILLIGRAM(S): 20 TABLET, DELAYED RELEASE ORAL at 05:58

## 2017-06-21 NOTE — PROGRESS NOTE ADULT - SUBJECTIVE AND OBJECTIVE BOX
CHIEF COMPLAINT:Patient is a 72y old  Male who presents with a chief complaint of Chest pain (19 Jun 2017 20:57)    	  Interval history: no chest pain, throat pain persisting      Allergies:  penicillin (Pruritus)      PAST MEDICAL & SURGICAL HISTORY:  Gastrostomy in place: surgical gastrostomy, NOT a PEG  PEG (percutaneous endoscopic gastrostomy) status  Ischemic bowel disease: s/p exploratory laparotomy and creation of ileostomy 8/2013  Hypothyroid  Seizures: 8/2014 on keppra last EEG - no activity  CVA (cerebral infarction): with RUE weakness  Anoxic brain injury  Aortic aneurysm rupture: with residual Type B dissection  Aortic aneurysm, abdominal  Aortic aneurysm and dissection: type A with rupture 8/2013  Gout  BPH (Benign Prostatic Hyperplasia)  HTN - Hypertension  History of tracheostomy: on 8/2013 with decannulation  Ascending aortic dissection: s/p repair on 8/19/2013  S/P ileostomy: exploratory laparotomy, creation of ileostomy and PEG on 8/24/13 due to ischemic bowel      FAMILY HISTORY:  Family history of stroke  Family history of diabetes mellitus type II (Father)      REVIEW OF SYSTEMS:  CONSTITUTIONAL: No fever, weight loss, or fatigue  EYES: No eye pain, visual disturbances, or discharge  NECK: No pain or stiffness  RESPIRATORY: No cough or wheezing, no shortness of breath, c/o sore throat  CARDIOVASCULAR: chest pain improved, no palpitations, dizziness, or leg swelling  GASTROINTESTINAL: No abdominal or epigastric pain. No nausea, vomiting, diarrhea or constipation  GENITOURINARY: No dysuria, urinary frequency or urgency, no hematuria  NEUROLOGICAL: No headaches, memory loss, loss of strength, numbness, or tremors  SKIN: No itching, burning, rashes, or lesions   MUSCULOSKELETAL: No joint pain or swelling; No muscle, back, or extremity pain  Medications:  MEDICATIONS  (STANDING):  finasteride 5milliGRAM(s) Oral daily  doxazosin 8milliGRAM(s) Oral at bedtime  heparin  Injectable 5000Unit(s) SubCutaneous every 12 hours  aspirin enteric coated 81milliGRAM(s) Oral daily  metoprolol 25milliGRAM(s) Oral two times a day  allopurinol 100milliGRAM(s) Oral daily  pantoprazole    Tablet 40milliGRAM(s) Oral before breakfast  levothyroxine 25MICROGram(s) Oral daily  doxycycline hyclate Capsule 100milliGRAM(s) Oral every 12 hours    MEDICATIONS  (PRN):    	    PHYSICAL EXAM:  T(C): 36.3, Max: 36.7 (06-20 @ 21:54)  HR: 62 (48 - 73)  BP: 115/68 (107/64 - 125/73)  RR: 18 (18 - 18)  SpO2: 97% (96% - 97%)  Wt(kg): --  I&O's Summary    I & Os for current day (as of 21 Jun 2017 15:05)  =============================================  IN: 1320 ml / OUT: 0 ml / NET: 1320 ml      Appearance: Normal	  HEENT:   NCAT, PERRL, EOMI	  Lymphatic: No lymphadenopathy  Cardiovascular: Normal S1 S2, RRR  Respiratory: Lungs clear to auscultation BL  Psychiatry: A & O x 3, Mood & affect appropriate  Gastrointestinal:  Soft, Non-tender, + BS  Skin: No rashes, No ecchymoses, No cyanosis	  Neurologic: Non-focal  Extremities: Normal range of motion, No clubbing, cyanosis or edema    	  LABS:	 	    CARDIAC MARKERS:  CARDIAC MARKERS ( 20 Jun 2017 07:45 )  x     / <0.01 ng/mL / 42 U/L / x     / 2.9 ng/mL  CARDIAC MARKERS ( 19 Jun 2017 20:08 )  x     / <0.01 ng/mL / 44 U/L / x     / 3.5 ng/mL  CARDIAC MARKERS ( 19 Jun 2017 16:56 )  x     / <0.01 ng/mL / x     / x     / x                                    10.6   8.10  )-----------( 274      ( 20 Jun 2017 09:28 )             33.9     06-20    141  |  103  |  24<H>  ----------------------------<  82  3.9   |  23  |  1.11    Ca    8.8      20 Jun 2017 07:45      proBNP:   Lipid Profile:   HgA1c:   TSH:

## 2017-06-21 NOTE — PROGRESS NOTE ADULT - ASSESSMENT
73 yo M as above:  1. Chest pain - improved, but NST (+) for ischemia, possible cath  2. Seizure d/o - c/w Keppra  3. Throat pain - started on Doxycyclin as outpt, will continue course, laryngoscopy by ENT  4. Hypothyroid - c/w Synthroid  5. HTN - BP at goal  6. BPH - continue therapy  7. DVT prophylaxis

## 2017-06-21 NOTE — PROVIDER CONTACT NOTE (MEDICATION) - ACTION/TREATMENT ORDERED:
wake patient up and after some time reevaluate HR. Pt woken up taken to the bathroom and after 30 mins HR increased to 68. Metoprolol was given

## 2017-06-21 NOTE — PROGRESS NOTE ADULT - SUBJECTIVE AND OBJECTIVE BOX
Lying in bed. No CP or SOB  VS 97.6 68 20 125/73  CV RRR nl S1 and S2  LUNG CTA B  Abd NT ND +BS  EXT no c/c/e    labs reviewed

## 2017-06-22 VITALS
OXYGEN SATURATION: 96 % | TEMPERATURE: 97 F | HEART RATE: 54 BPM | RESPIRATION RATE: 18 BRPM | DIASTOLIC BLOOD PRESSURE: 75 MMHG | SYSTOLIC BLOOD PRESSURE: 129 MMHG

## 2017-06-22 LAB
ANION GAP SERPL CALC-SCNC: 9 MMOL/L — SIGNIFICANT CHANGE UP (ref 5–17)
BUN SERPL-MCNC: 28 MG/DL — HIGH (ref 7–23)
CALCIUM SERPL-MCNC: 8.8 MG/DL — SIGNIFICANT CHANGE UP (ref 8.4–10.5)
CHLORIDE SERPL-SCNC: 103 MMOL/L — SIGNIFICANT CHANGE UP (ref 96–108)
CO2 SERPL-SCNC: 28 MMOL/L — SIGNIFICANT CHANGE UP (ref 22–31)
CREAT SERPL-MCNC: 1.23 MG/DL — SIGNIFICANT CHANGE UP (ref 0.5–1.3)
GLUCOSE SERPL-MCNC: 88 MG/DL — SIGNIFICANT CHANGE UP (ref 70–99)
POTASSIUM SERPL-MCNC: 3.8 MMOL/L — SIGNIFICANT CHANGE UP (ref 3.5–5.3)
POTASSIUM SERPL-SCNC: 3.8 MMOL/L — SIGNIFICANT CHANGE UP (ref 3.5–5.3)
SODIUM SERPL-SCNC: 140 MMOL/L — SIGNIFICANT CHANGE UP (ref 135–145)

## 2017-06-22 PROCEDURE — 87633 RESP VIRUS 12-25 TARGETS: CPT

## 2017-06-22 PROCEDURE — 85027 COMPLETE CBC AUTOMATED: CPT

## 2017-06-22 PROCEDURE — C1894: CPT

## 2017-06-22 PROCEDURE — 84132 ASSAY OF SERUM POTASSIUM: CPT

## 2017-06-22 PROCEDURE — 82330 ASSAY OF CALCIUM: CPT

## 2017-06-22 PROCEDURE — 93017 CV STRESS TEST TRACING ONLY: CPT

## 2017-06-22 PROCEDURE — 97162 PT EVAL MOD COMPLEX 30 MIN: CPT

## 2017-06-22 PROCEDURE — 87040 BLOOD CULTURE FOR BACTERIA: CPT

## 2017-06-22 PROCEDURE — 87486 CHLMYD PNEUM DNA AMP PROBE: CPT

## 2017-06-22 PROCEDURE — 87581 M.PNEUMON DNA AMP PROBE: CPT

## 2017-06-22 PROCEDURE — 85014 HEMATOCRIT: CPT

## 2017-06-22 PROCEDURE — 82435 ASSAY OF BLOOD CHLORIDE: CPT

## 2017-06-22 PROCEDURE — C1769: CPT

## 2017-06-22 PROCEDURE — 87798 DETECT AGENT NOS DNA AMP: CPT

## 2017-06-22 PROCEDURE — 93005 ELECTROCARDIOGRAM TRACING: CPT

## 2017-06-22 PROCEDURE — 78452 HT MUSCLE IMAGE SPECT MULT: CPT

## 2017-06-22 PROCEDURE — 87086 URINE CULTURE/COLONY COUNT: CPT

## 2017-06-22 PROCEDURE — 71045 X-RAY EXAM CHEST 1 VIEW: CPT

## 2017-06-22 PROCEDURE — C1887: CPT

## 2017-06-22 PROCEDURE — A9500: CPT

## 2017-06-22 PROCEDURE — 74174 CTA ABD&PLVS W/CONTRAST: CPT

## 2017-06-22 PROCEDURE — 83874 ASSAY OF MYOGLOBIN: CPT

## 2017-06-22 PROCEDURE — 84295 ASSAY OF SERUM SODIUM: CPT

## 2017-06-22 PROCEDURE — 82947 ASSAY GLUCOSE BLOOD QUANT: CPT

## 2017-06-22 PROCEDURE — 82553 CREATINE MB FRACTION: CPT

## 2017-06-22 PROCEDURE — 80053 COMPREHEN METABOLIC PANEL: CPT

## 2017-06-22 PROCEDURE — 93454 CORONARY ARTERY ANGIO S&I: CPT

## 2017-06-22 PROCEDURE — 99284 EMERGENCY DEPT VISIT MOD MDM: CPT | Mod: 25

## 2017-06-22 PROCEDURE — 83605 ASSAY OF LACTIC ACID: CPT

## 2017-06-22 PROCEDURE — 81001 URINALYSIS AUTO W/SCOPE: CPT

## 2017-06-22 PROCEDURE — 80048 BASIC METABOLIC PNL TOTAL CA: CPT

## 2017-06-22 PROCEDURE — 71275 CT ANGIOGRAPHY CHEST: CPT

## 2017-06-22 PROCEDURE — 82803 BLOOD GASES ANY COMBINATION: CPT

## 2017-06-22 PROCEDURE — A9505: CPT

## 2017-06-22 PROCEDURE — 82550 ASSAY OF CK (CPK): CPT

## 2017-06-22 PROCEDURE — 84484 ASSAY OF TROPONIN QUANT: CPT

## 2017-06-22 RX ORDER — ASPIRIN/CALCIUM CARB/MAGNESIUM 324 MG
1 TABLET ORAL
Qty: 0 | Refills: 0 | COMMUNITY
Start: 2017-06-22

## 2017-06-22 RX ORDER — PANTOPRAZOLE SODIUM 20 MG/1
1 TABLET, DELAYED RELEASE ORAL
Qty: 0 | Refills: 0 | DISCHARGE
Start: 2017-06-22

## 2017-06-22 RX ORDER — LEVOTHYROXINE SODIUM 125 MCG
1 TABLET ORAL
Qty: 0 | Refills: 0 | COMMUNITY
Start: 2017-06-22

## 2017-06-22 RX ORDER — ALLOPURINOL 300 MG
1 TABLET ORAL
Qty: 0 | Refills: 0 | COMMUNITY
Start: 2017-06-22

## 2017-06-22 RX ORDER — ALLOPURINOL 300 MG
1 TABLET ORAL
Qty: 0 | Refills: 0 | DISCHARGE
Start: 2017-06-22

## 2017-06-22 RX ORDER — ALLOPURINOL 300 MG
2 TABLET ORAL
Qty: 0 | Refills: 0 | COMMUNITY
Start: 2017-06-22

## 2017-06-22 RX ADMIN — Medication 25 MICROGRAM(S): at 05:07

## 2017-06-22 RX ADMIN — Medication 100 MILLIGRAM(S): at 12:14

## 2017-06-22 RX ADMIN — Medication 100 MILLIGRAM(S): at 08:35

## 2017-06-22 RX ADMIN — PANTOPRAZOLE SODIUM 40 MILLIGRAM(S): 20 TABLET, DELAYED RELEASE ORAL at 05:07

## 2017-06-22 RX ADMIN — Medication 25 MILLIGRAM(S): at 05:07

## 2017-06-22 RX ADMIN — Medication 81 MILLIGRAM(S): at 12:14

## 2017-06-22 RX ADMIN — HEPARIN SODIUM 5000 UNIT(S): 5000 INJECTION INTRAVENOUS; SUBCUTANEOUS at 05:07

## 2017-06-22 NOTE — DISCHARGE NOTE ADULT - MEDICATION SUMMARY - MEDICATIONS TO TAKE
I will START or STAY ON the medications listed below when I get home from the hospital:    finasteride 5 mg oral tablet  -- 1 tab(s) by mouth once a day peg  -- Indication: For BPH    aspirin 81 mg oral delayed release tablet  -- 1 tab(s) by mouth once a day  -- Indication: For Cardiac protection and stroke prevention    Flomax 0.4 mg oral capsule  -- 1 cap(s) by mouth once a day  -- Indication: For BPH    allopurinol 100 mg oral tablet  -- 2 tab(s) by mouth once a day  -- Indication: For gout    metoprolol tartrate 25 mg oral tablet  -- 1 tab(s) by mouth every 12 hours   -- Indication: For Hypertension    pantoprazole 40 mg oral delayed release tablet  -- 1 tab(s) by mouth once a day (before a meal)  -- Indication: For GI prophylaxis    levothyroxine 50 mcg (0.05 mg) oral tablet  -- 1 tab(s) by mouth once a day  -- Indication: For Hypothyropidism

## 2017-06-22 NOTE — DISCHARGE NOTE ADULT - HOSPITAL COURSE
MD 70 y/o M with PMH of HTN, gout, seizures, BPH, hypothyroid, ruptured AAA  s/p repaired presents with 3 days of constant, pressure-like, chest pain, 6/10, radiating to his right side, nonexertional, nonpleuritic. No shortness of breath, no diaphoresis, no edema, no palptiations. Patient has R sided residual deficits from prior cva.    DX	Atypical Chest pain - nonobstructive CAD             CARDS:  Recommend an exercise stress nuclear study to evaluate source of chest pain and assess ischemia. CT was negative as  6/19    CE x 3 - negative  6/20    Stress test - Abnormal   6/21    Cardiac cath - nonobstructive CAD, Laryngoscopy - inflammatory changes 2/2 GERD, cleared for discharge home

## 2017-06-22 NOTE — PROGRESS NOTE ADULT - SUBJECTIVE AND OBJECTIVE BOX
CHIEF COMPLAINT:Patient is a 72y old  Male who presents with a chief complaint of Chest pain (19 Jun 2017 20:57)    	  Interval history: no chest pain      Allergies:  penicillin (Pruritus)      PAST MEDICAL & SURGICAL HISTORY:  Gastrostomy in place: surgical gastrostomy, NOT a PEG  PEG (percutaneous endoscopic gastrostomy) status  Ischemic bowel disease: s/p exploratory laparotomy and creation of ileostomy 8/2013  Hypothyroid  Seizures: 8/2014 on keppra last EEG - no activity  CVA (cerebral infarction): with RUE weakness  Anoxic brain injury  Aortic aneurysm rupture: with residual Type B dissection  Aortic aneurysm, abdominal  Aortic aneurysm and dissection: type A with rupture 8/2013  Gout  BPH (Benign Prostatic Hyperplasia)  HTN - Hypertension  History of tracheostomy: on 8/2013 with decannulation  Ascending aortic dissection: s/p repair on 8/19/2013  S/P ileostomy: exploratory laparotomy, creation of ileostomy and PEG on 8/24/13 due to ischemic bowel      FAMILY HISTORY:  Family history of stroke  Family history of diabetes mellitus type II (Father)      REVIEW OF SYSTEMS:  CONSTITUTIONAL: No fever, weight loss, or fatigue  EYES: No eye pain, visual disturbances, or discharge  NECK: No pain or stiffness  RESPIRATORY: No cough or wheezing, no shortness of breath, c/o sore throat  CARDIOVASCULAR: chest pain improved, no palpitations, dizziness, or leg swelling  GASTROINTESTINAL: No abdominal or epigastric pain. No nausea, vomiting, diarrhea or constipation  GENITOURINARY: No dysuria, urinary frequency or urgency, no hematuria  NEUROLOGICAL: No headaches, memory loss, loss of strength, numbness, or tremors  SKIN: No itching, burning, rashes, or lesions   MUSCULOSKELETAL: No joint pain or swelling; No muscle, back, or extremity pain      Medications:  MEDICATIONS  (STANDING):  finasteride 5milliGRAM(s) Oral daily  doxazosin 8milliGRAM(s) Oral at bedtime  heparin  Injectable 5000Unit(s) SubCutaneous every 12 hours  aspirin enteric coated 81milliGRAM(s) Oral daily  metoprolol 25milliGRAM(s) Oral two times a day  allopurinol 100milliGRAM(s) Oral daily  pantoprazole    Tablet 40milliGRAM(s) Oral before breakfast  levothyroxine 25MICROGram(s) Oral daily    MEDICATIONS  (PRN):    	    PHYSICAL EXAM:  T(C): 36.2, Max: 36.9 (06-21 @ 17:45)  HR: 54 (54 - 66)  BP: 129/75 (113/72 - 152/80)  RR: 18 (18 - 18)  SpO2: 96% (94% - 99%)  Wt(kg): --  I&O's Summary    I & Os for current day (as of 22 Jun 2017 14:52)  =============================================  IN: 240 ml / OUT: 0 ml / NET: 240 ml      Appearance: Normal	  HEENT:   NCAT, PERRL, EOMI	  Lymphatic: No lymphadenopathy  Cardiovascular: Normal S1 S2, RRR  Respiratory: Lungs clear to auscultation BL  Psychiatry: A & O x 3, Mood & affect appropriate  Gastrointestinal:  Soft, Non-tender, + BS  Skin: No rashes, No ecchymoses, No cyanosis	  Neurologic: Non-focal  Extremities: Normal range of motion, No clubbing, cyanosis or edema    	  LABS:	 	        06-22    140  |  103  |  28<H>  ----------------------------<  88  3.8   |  28  |  1.23    Ca    8.8      22 Jun 2017 06:06

## 2017-06-22 NOTE — DISCHARGE NOTE ADULT - MEDICATION SUMMARY - MEDICATIONS TO STOP TAKING
I will STOP taking the medications listed below when I get home from the hospital:    heparin  -- 5000 milligram(s) subcutaneous every 8 hours    levETIRAcetam 100 mg/mL oral solution  -- 10 milliliter(s) by mouth 2 times a day, via peg    FLUoxetine 20 mg/5 mL oral solution  -- 5 milliliter(s) by mouth once a day via peg    loperamide 1 mg/5 mL oral liquid  -- 10 milliliter(s) by mouth 2 times a day    albuterol CFC free 90 mcg/inh inhalation aerosol  -- 2 puff(s) inhaled every 6 hours    methylphenidate 5 mg oral tablet  -- 1 tab(s) by mouth via peg    psyllium 3.4 g/7 g oral powder for reconstitution  -- 1 packet(s) by mouth once a day via peg    potassium chloride 20 mEq/15 mL oral liquid  -- 30 milliliter(s) by mouth once a day via peg    calcium-vitamin D 500 mg-200 intl units oral tablet  -- 1 tab(s) by mouth once a day via peg    doxazosin 8 mg oral tablet  -- 1 tab(s) by mouth once a day via peg

## 2017-06-22 NOTE — PROGRESS NOTE ADULT - ASSESSMENT
73 yo M as above:  1. Chest pain - s/p cath, nonobstructive disease, c/w medical management, d/c home today  2. Seizure d/o - c/w Keppra  3. Throat pain - ss/p laryngoscopy, d/c Doxy, c/w PPI  4. Hypothyroid - c/w Synthroid  5. HTN - BP at goal  6. BPH - continue therapy  7. DVT prophylaxis

## 2017-06-22 NOTE — DISCHARGE NOTE ADULT - SECONDARY DIAGNOSIS.
Dysphagia, unspecified type Essential hypertension Hypothyroid Benign prostatic hyperplasia without lower urinary tract symptoms, unspecified morphology

## 2017-06-22 NOTE — DISCHARGE NOTE ADULT - ADDITIONAL INSTRUCTIONS
Follow up with PMD - Dr. Torres in 1 week - call for appointment  Follow up with Cardiologist - Dr. Solomon in 1 week - call for appointment  Follow up GI per Dr. Torres if further issues with dysphagia

## 2017-06-22 NOTE — DISCHARGE NOTE ADULT - PATIENT PORTAL LINK FT
“You can access the [facility] Patient Portal, offered by [facility], by registering with the following website: [email or web address]”

## 2017-06-22 NOTE — DISCHARGE NOTE ADULT - PLAN OF CARE
Remains free of chest pain HOME CARE INSTRUCTIONS  For the next few days, avoid physical activities that bring on chest pain. Continue physical activities as directed.  Do not smoke.  Avoid drinking alcohol.   Only take over-the-counter or prescription medicine for pain, discomfort, or fever as directed by your caregiver.  Follow your caregiver's suggestions for further testing if your chest pain does not go away.  Keep any follow-up appointments you made. If you do not go to an appointment, you could develop lasting (chronic) problems with pain. If there is any problem keeping an appointment, you must call to reschedule.   SEEK MEDICAL CARE IF:  You think you are having problems from the medicine you are taking. Read your medicine instructions carefully.  Your chest pain does not go away, even after treatment.  You develop a rash with blisters on your chest.  SEEK IMMEDIATE MEDICAL CARE IF:  You have increased chest pain or pain that spreads to your arm, neck, jaw, back, or abdomen.   You develop shortness of breath, an increasing cough, or you are coughing up blood.  You have severe back or abdominal pain, feel nauseous, or vomit.  You develop severe weakness, fainting, or chills.  You have a fever.  THIS IS AN EMERGENCY. Do not wait to see if the pain will go away. Get medical help at once. Call your local emergency services 911. Do not drive yourself to the hospital. Follow up with GI as outpatient  ENT scoped - showed no upper obstruction Take medications for your blood pressure as recommended.  Eat a heart healthy diet that is low in saturated fats and salt, and includes whole grains, fruits, vegetables and lean protein   Exercise regularly (consult with your physician or cardiologist first); maintain a heart healthy weight.   If you smoke - quit (A resource to help you stop smoking is the [facility] – phone number 370-870-9546.). Continue to follow with your primary physician or cardiologist.   Seek medical help for dizziness, Lightheadedness, Blurry vision, Headache, Chest pain, Shortness of breath  Follow up with your medical doctor to establish long term blood pressure treatment goals. you do not make enough thyroid hormone  signs & symptoms of low levels of thyroid hormone - tired, getting cold easily, coarse or thin hair, constipation, shortness of breath, swelling, irregular periods  your doctor will do thyroid hormone blood tests at least once a year to monitor if medication dose is adequate  take your thyroid medicine as directed by your doctor & on empty stomach Continue medication as recommended

## 2017-06-22 NOTE — DISCHARGE NOTE ADULT - MEDICATION SUMMARY - MEDICATIONS TO CHANGE
I will SWITCH the dose or number of times a day I take the medications listed below when I get home from the hospital:    metoprolol tartrate 25 mg oral tablet  -- 1 tab(s) by mouth every 12 hours via peg    pantoprazole 40 mg oral granule, enteric coated  -- 40 milligram(s) by mouth once a day via peg    levothyroxine 50 mcg (0.05 mg) oral tablet  -- 1 tab(s) by mouth once a day via peg    Proscar  --  by mouth    labetalol  --  by mouth    tamsulosin  --  by mouth    Nuedexta  --  by mouth

## 2017-06-22 NOTE — DISCHARGE NOTE ADULT - FINDINGS/TREATMENT
* The left ventricle was normal in size. There is a  medium-sized, moderate defect in the basal to mid  inferolateral wall that is mostly reversible suggestive of  ischemia with partial scarring.  * There is a large, moderate defect in the inferior and  inferoseptal walls that is partially reversible suggestive  of infarct with mild to moderate jennifer-infarct ischemia.

## 2017-06-22 NOTE — PROGRESS NOTE ADULT - SUBJECTIVE AND OBJECTIVE BOX
SUBJECTIVE:  	  MEDICATIONS:  doxazosin 8milliGRAM(s) Oral at bedtime  metoprolol 25milliGRAM(s) Oral two times a day    doxycycline hyclate Capsule 100milliGRAM(s) Oral every 12 hours        pantoprazole    Tablet 40milliGRAM(s) Oral before breakfast    finasteride 5milliGRAM(s) Oral daily  allopurinol 100milliGRAM(s) Oral daily  levothyroxine 25MICROGram(s) Oral daily    heparin  Injectable 5000Unit(s) SubCutaneous every 12 hours  aspirin enteric coated 81milliGRAM(s) Oral daily      REVIEW OF SYSTEMS:    CONSTITUTIONAL: No fever, weight loss, or fatigue  EYES: No eye pain, visual disturbances, or discharge  NECK: No pain or stiffness  RESPIRATORY: No cough, wheezing, chills or hemoptysis; No Shortness of Breath  CARDIOVASCULAR: No chest pain, palpitations, dizziness, or leg swelling  GASTROINTESTINAL: No abdominal or epigastric pain. No nausea, vomiting, or hematemesis; No diarrhea or constipation. No melena or hematochezia.  GENITOURINARY: No dysuria, frequency, hematuria, or incontinence  NEUROLOGICAL: No headaches, memory loss, loss of strength, numbness, or tremors  SKIN: No itching, burning, rashes, or lesions   LYMPH Nodes: No enlarged glands  MUSCULOSKELETAL: No joint pain or swelling; No muscle, back, or extremity pain  All other review of systems are negative.  	  [ ] Unable to obtain    PHYSICAL EXAM:  T(C): 36.2, Max: 36.9 (06-21 @ 17:45)  HR: 64 (54 - 66)  BP: 120/71 (113/72 - 152/80)  RR: 18 (18 - 18)  SpO2: 94% (94% - 99%)  Wt(kg): --  I&O's Summary    I & Os for current day (as of 22 Jun 2017 09:13)  =============================================  IN: 240 ml / OUT: 0 ml / NET: 240 ml        PHYSICAL EXAM    Appearance: Normal	  HEENT:   Normal oral mucosa, PERRL, EOMI	  NECK: Soft and supple, No LAD, No JVD  Cardiovascular: Regular Rate and Rhythm, Normal S1 S2, No murmurs, No clicks, gallops or rubs  Respiratory: Lungs clear to auscultation	  Gastrointestinal:  Soft, Non-tender, + BS	  Skin: No rashes, No ecchymoses, No cyanosis  Neurologic: Non-focal  Extremities: No clubbing, cyanosis or edema  Vascular: Peripheral pulses palpable 2+ bilaterally    TELEMETRY: 	Sinus Rhythm 50's t0 70's     	  RADIOLOGY  DIAGNOSTIC TESTING:  [ ] Echocardiogram:  [X] Catheterization:   CORONARY VESSELS: The coronary circulation is left dominant.  LM:   --  LM: Normal.  LAD:   --  Mid LAD: There was a tubular 20 % stenosis.  CX:   --  Circumflex: Normal.  RCA:   --  Proximal RCA: There was a discrete 20 % stenosis.  [ ] Stress Test:    	    LABS:	 	                            10.6   8.10  )-----------( 274      ( 20 Jun 2017 09:28 )             33.9     06-22    140  |  103  |  28<H>  ----------------------------<  88  3.8   |  28  |  1.23    Ca    8.8      22 Jun 2017 06:06

## 2017-06-22 NOTE — CHART NOTE - NSCHARTNOTEFT_GEN_A_CORE
Called by Cardiologist fellow DR. Rogerio Epps,  to report abnormal stress test. The findings are There is a medium-sized, moderate defect in the basal to mid inferolateral wall that is mostly reversible suggestive of ischemia with partial scarring. There is a large, moderate defect in the inferior and inferoseptal walls that is partially reversible suggestive of infarct with mild to moderate jennifer-infarct ischemia  D/W attending Dr. Mclaughlin who will call cardiology for possible cardiac cath in AM.          Phone: [phone]
Medicine NP  Request from Dr. Mclaughlin to discharge patient home. Medication reconciliation reviewed revised and resolved by Dr. Mclaughlin with follow up as advised and has medically cleared for discharge. See discharge note for detailed hospital course    #[id number]

## 2017-06-22 NOTE — PROGRESS NOTE ADULT - ASSESSMENT
Patient with a normal cath with no significant cardiac disease.  Discharge patient today.  Will follow up as an outpatient  Continue metoprolol and aspirin.  Will check cholesterol as an outpt. and decide on a statin.

## 2017-06-22 NOTE — DISCHARGE NOTE ADULT - CARE PROVIDER_API CALL
Zion Torres), Internal Medicine  [address]  [place] [address]  Phone: [phone]  Fax: [phone]    Moy Solomon), Cardiovascular Disease  [address]  [place], [place] [id number]  Phone: [phone]  Fax: [phone]

## 2017-06-24 LAB
CULTURE RESULTS: SIGNIFICANT CHANGE UP
CULTURE RESULTS: SIGNIFICANT CHANGE UP
SPECIMEN SOURCE: SIGNIFICANT CHANGE UP
SPECIMEN SOURCE: SIGNIFICANT CHANGE UP

## 2017-07-20 ENCOUNTER — INPATIENT (INPATIENT)
Facility: HOSPITAL | Age: 72
LOS: 9 days | Discharge: HOME | DRG: 378 | End: 2017-07-30
Attending: INTERNAL MEDICINE | Admitting: INTERNAL MEDICINE
Payer: MEDICARE

## 2017-07-20 VITALS
HEART RATE: 86 BPM | RESPIRATION RATE: 16 BRPM | SYSTOLIC BLOOD PRESSURE: 123 MMHG | TEMPERATURE: 99 F | OXYGEN SATURATION: 99 % | DIASTOLIC BLOOD PRESSURE: 78 MMHG

## 2017-07-20 DIAGNOSIS — K62.5 HEMORRHAGE OF ANUS AND RECTUM: ICD-10-CM

## 2017-07-20 DIAGNOSIS — I71.01 DISSECTION OF THORACIC AORTA: Chronic | ICD-10-CM

## 2017-07-20 DIAGNOSIS — E03.9 HYPOTHYROIDISM, UNSPECIFIED: ICD-10-CM

## 2017-07-20 DIAGNOSIS — Z87.19 PERSONAL HISTORY OF OTHER DISEASES OF THE DIGESTIVE SYSTEM: Chronic | ICD-10-CM

## 2017-07-20 DIAGNOSIS — Z93.0 TRACHEOSTOMY STATUS: Chronic | ICD-10-CM

## 2017-07-20 DIAGNOSIS — R12 HEARTBURN: ICD-10-CM

## 2017-07-20 DIAGNOSIS — I10 ESSENTIAL (PRIMARY) HYPERTENSION: ICD-10-CM

## 2017-07-20 DIAGNOSIS — N40.0 BENIGN PROSTATIC HYPERPLASIA WITHOUT LOWER URINARY TRACT SYMPTOMS: ICD-10-CM

## 2017-07-20 DIAGNOSIS — Z93.2 ILEOSTOMY STATUS: Chronic | ICD-10-CM

## 2017-07-20 DIAGNOSIS — M10.9 GOUT, UNSPECIFIED: ICD-10-CM

## 2017-07-20 DIAGNOSIS — I71.00 DISSECTION OF UNSPECIFIED SITE OF AORTA: ICD-10-CM

## 2017-07-20 DIAGNOSIS — Z29.9 ENCOUNTER FOR PROPHYLACTIC MEASURES, UNSPECIFIED: ICD-10-CM

## 2017-07-20 LAB
ALBUMIN SERPL ELPH-MCNC: 3.7 G/DL — SIGNIFICANT CHANGE UP (ref 3.3–5)
ALP SERPL-CCNC: 92 U/L — SIGNIFICANT CHANGE UP (ref 40–120)
ALT FLD-CCNC: 39 U/L RC — SIGNIFICANT CHANGE UP (ref 10–45)
ANION GAP SERPL CALC-SCNC: 12 MMOL/L — SIGNIFICANT CHANGE UP (ref 5–17)
APTT BLD: 29.7 SEC — SIGNIFICANT CHANGE UP (ref 27.5–37.4)
AST SERPL-CCNC: 20 U/L — SIGNIFICANT CHANGE UP (ref 10–40)
BASOPHILS # BLD AUTO: 0 K/UL — SIGNIFICANT CHANGE UP (ref 0–0.2)
BASOPHILS NFR BLD AUTO: 0.4 % — SIGNIFICANT CHANGE UP (ref 0–2)
BILIRUB SERPL-MCNC: 0.3 MG/DL — SIGNIFICANT CHANGE UP (ref 0.2–1.2)
BLD GP AB SCN SERPL QL: NEGATIVE — SIGNIFICANT CHANGE UP
BUN SERPL-MCNC: 25 MG/DL — HIGH (ref 7–23)
CALCIUM SERPL-MCNC: 8.9 MG/DL — SIGNIFICANT CHANGE UP (ref 8.4–10.5)
CHLORIDE SERPL-SCNC: 105 MMOL/L — SIGNIFICANT CHANGE UP (ref 96–108)
CO2 SERPL-SCNC: 23 MMOL/L — SIGNIFICANT CHANGE UP (ref 22–31)
CREAT SERPL-MCNC: 1.12 MG/DL — SIGNIFICANT CHANGE UP (ref 0.5–1.3)
EOSINOPHIL # BLD AUTO: 0 K/UL — SIGNIFICANT CHANGE UP (ref 0–0.5)
EOSINOPHIL NFR BLD AUTO: 0.2 % — SIGNIFICANT CHANGE UP (ref 0–6)
GAS PNL BLDV: SIGNIFICANT CHANGE UP
GLUCOSE SERPL-MCNC: 104 MG/DL — HIGH (ref 70–99)
HCT VFR BLD CALC: 31.4 % — LOW (ref 39–50)
HGB BLD-MCNC: 10.3 G/DL — LOW (ref 13–17)
INR BLD: 1.13 RATIO — SIGNIFICANT CHANGE UP (ref 0.88–1.16)
LYMPHOCYTES # BLD AUTO: 1 K/UL — SIGNIFICANT CHANGE UP (ref 1–3.3)
LYMPHOCYTES # BLD AUTO: 11.9 % — LOW (ref 13–44)
MAGNESIUM SERPL-MCNC: 1.8 MG/DL — SIGNIFICANT CHANGE UP (ref 1.6–2.6)
MCHC RBC-ENTMCNC: 27.7 PG — SIGNIFICANT CHANGE UP (ref 27–34)
MCHC RBC-ENTMCNC: 32.7 GM/DL — SIGNIFICANT CHANGE UP (ref 32–36)
MCV RBC AUTO: 84.7 FL — SIGNIFICANT CHANGE UP (ref 80–100)
MONOCYTES # BLD AUTO: 0.6 K/UL — SIGNIFICANT CHANGE UP (ref 0–0.9)
MONOCYTES NFR BLD AUTO: 6.8 % — SIGNIFICANT CHANGE UP (ref 2–14)
NEUTROPHILS # BLD AUTO: 6.6 K/UL — SIGNIFICANT CHANGE UP (ref 1.8–7.4)
NEUTROPHILS NFR BLD AUTO: 80.7 % — HIGH (ref 43–77)
PHOSPHATE SERPL-MCNC: 3.3 MG/DL — SIGNIFICANT CHANGE UP (ref 2.5–4.5)
PLATELET # BLD AUTO: 223 K/UL — SIGNIFICANT CHANGE UP (ref 150–400)
POTASSIUM SERPL-MCNC: 4.4 MMOL/L — SIGNIFICANT CHANGE UP (ref 3.5–5.3)
POTASSIUM SERPL-SCNC: 4.4 MMOL/L — SIGNIFICANT CHANGE UP (ref 3.5–5.3)
PROT SERPL-MCNC: 6.7 G/DL — SIGNIFICANT CHANGE UP (ref 6–8.3)
PROTHROM AB SERPL-ACNC: 12.3 SEC — SIGNIFICANT CHANGE UP (ref 9.8–12.7)
RBC # BLD: 3.71 M/UL — LOW (ref 4.2–5.8)
RBC # FLD: 14.4 % — SIGNIFICANT CHANGE UP (ref 10.3–14.5)
RH IG SCN BLD-IMP: POSITIVE — SIGNIFICANT CHANGE UP
SODIUM SERPL-SCNC: 140 MMOL/L — SIGNIFICANT CHANGE UP (ref 135–145)
WBC # BLD: 8.2 K/UL — SIGNIFICANT CHANGE UP (ref 3.8–10.5)
WBC # FLD AUTO: 8.2 K/UL — SIGNIFICANT CHANGE UP (ref 3.8–10.5)

## 2017-07-20 PROCEDURE — 74178 CT ABD&PLV WO CNTR FLWD CNTR: CPT | Mod: 26

## 2017-07-20 PROCEDURE — 99223 1ST HOSP IP/OBS HIGH 75: CPT

## 2017-07-20 PROCEDURE — 99285 EMERGENCY DEPT VISIT HI MDM: CPT | Mod: 25,GC

## 2017-07-20 PROCEDURE — 93010 ELECTROCARDIOGRAM REPORT: CPT

## 2017-07-20 RX ORDER — ALLOPURINOL 300 MG
100 TABLET ORAL
Qty: 0 | Refills: 0 | Status: DISCONTINUED | OUTPATIENT
Start: 2017-07-20 | End: 2017-07-30

## 2017-07-20 RX ORDER — LEVOTHYROXINE SODIUM 125 MCG
50 TABLET ORAL DAILY
Qty: 0 | Refills: 0 | Status: DISCONTINUED | OUTPATIENT
Start: 2017-07-20 | End: 2017-07-30

## 2017-07-20 RX ORDER — PANTOPRAZOLE SODIUM 20 MG/1
40 TABLET, DELAYED RELEASE ORAL
Qty: 0 | Refills: 0 | Status: DISCONTINUED | OUTPATIENT
Start: 2017-07-20 | End: 2017-07-23

## 2017-07-20 RX ORDER — SODIUM CHLORIDE 9 MG/ML
1000 INJECTION INTRAMUSCULAR; INTRAVENOUS; SUBCUTANEOUS
Qty: 0 | Refills: 0 | Status: DISCONTINUED | OUTPATIENT
Start: 2017-07-20 | End: 2017-07-23

## 2017-07-20 RX ORDER — SOD SULF/SODIUM/NAHCO3/KCL/PEG
4000 SOLUTION, RECONSTITUTED, ORAL ORAL ONCE
Qty: 0 | Refills: 0 | Status: COMPLETED | OUTPATIENT
Start: 2017-07-20 | End: 2017-07-20

## 2017-07-20 RX ORDER — FERROUS SULFATE 325(65) MG
325 TABLET ORAL DAILY
Qty: 0 | Refills: 0 | Status: DISCONTINUED | OUTPATIENT
Start: 2017-07-20 | End: 2017-07-23

## 2017-07-20 RX ORDER — FINASTERIDE 5 MG/1
5 TABLET, FILM COATED ORAL DAILY
Qty: 0 | Refills: 0 | Status: DISCONTINUED | OUTPATIENT
Start: 2017-07-20 | End: 2017-07-30

## 2017-07-20 RX ORDER — SODIUM CHLORIDE 9 MG/ML
1000 INJECTION INTRAMUSCULAR; INTRAVENOUS; SUBCUTANEOUS ONCE
Qty: 0 | Refills: 0 | Status: COMPLETED | OUTPATIENT
Start: 2017-07-20 | End: 2017-07-20

## 2017-07-20 RX ORDER — LIDOCAINE 4 G/100G
10 CREAM TOPICAL ONCE
Qty: 0 | Refills: 0 | Status: COMPLETED | OUTPATIENT
Start: 2017-07-20 | End: 2017-07-20

## 2017-07-20 RX ORDER — PANTOPRAZOLE SODIUM 20 MG/1
80 TABLET, DELAYED RELEASE ORAL ONCE
Qty: 0 | Refills: 0 | Status: COMPLETED | OUTPATIENT
Start: 2017-07-20 | End: 2017-07-20

## 2017-07-20 RX ADMIN — Medication 10 MILLIGRAM(S): at 22:13

## 2017-07-20 RX ADMIN — LIDOCAINE 10 MILLILITER(S): 4 CREAM TOPICAL at 19:42

## 2017-07-20 RX ADMIN — PANTOPRAZOLE SODIUM 80 MILLIGRAM(S): 20 TABLET, DELAYED RELEASE ORAL at 19:41

## 2017-07-20 RX ADMIN — SODIUM CHLORIDE 2000 MILLILITER(S): 9 INJECTION INTRAMUSCULAR; INTRAVENOUS; SUBCUTANEOUS at 13:53

## 2017-07-20 RX ADMIN — Medication 4000 MILLILITER(S): at 22:28

## 2017-07-20 RX ADMIN — Medication 30 MILLILITER(S): at 19:41

## 2017-07-20 NOTE — ED PROVIDER NOTE - MUSCULOSKELETAL, MLM
Minimal movement of R hand w/ pincer grasp. Able to flex R arm at elbow 20 degrees. 4.5/5 weakness of R leg at hip. Sensation intact. CN2-12 intact. Speech clear.

## 2017-07-20 NOTE — ED PROVIDER NOTE - GASTROINTESTINAL, MLM
Abdomen soft, mild right lower quadrant tenderness w/o guarding. Melena on rectal exam, non tender. Large midline chest/abdominal scars, well healed. Abdomen soft, mild right lower quadrant tenderness w/o guarding. Maroon stool on rectal exam, non tender. Large midline chest/abdominal scars, well healed.

## 2017-07-20 NOTE — H&P ADULT - HISTORY OF PRESENT ILLNESS
72 year old male with PMH of aortic dissection complicated by ischemic bowel s/p resection, tracheostomy s/p reversal, ileostomy s/p reversal, right arm paralysis, HTN, gout, seizures, BPH, and hypothyroidism; presents after lower GI bleeding starting this morning. The patient had 3 episodes of dark red/brown liquid stools this morning, so his wife brought him to the ED. He also reports some fecal urgency, chronic diarrhea, and heartburn. He also has some lower abdominal pain. He has phlegm in this throat. He denies fever, SOB, HA, N/V, or dysuria. He doesn't eat much usually, but that hasn't changed recently. His wife says he hasn't had a seizure in a long time.  About a month ago (6/2017) he has atypical CP and had a cardiac cath which showed an EF of 55% and non-obstructive CAD.   Patient given pantoprazole IV, viscous lidocaine, and maalox in the ED. S/p 1L bolus of NS in the ED.

## 2017-07-20 NOTE — ED ADULT NURSE REASSESSMENT NOTE - NS ED NURSE REASSESS COMMENT FT1
Patient denies abdominal pain, n/v/d, dizziness, chills, numbness, chest pain; a&ox3; report given to nurse on 4dsu; awaiting bed assignment; safety and comfort measures provided; spouse at bedside

## 2017-07-20 NOTE — H&P ADULT - FAMILY HISTORY
<<-----Click on this checkbox to enter Family History Family history of stroke     Mother  Still living? Unknown  Family history of diabetes mellitus type II, Age at diagnosis: Age Unknown

## 2017-07-20 NOTE — H&P ADULT - PROBLEM SELECTOR PLAN 1
-Admit for GI consult and monitoring.  -Dr. Can from GI called by Dr. Keith.  -Trend H/H and transfuse for Hgb <8.   -Monitor vitals closely for hypotension.  -Keep NPO for now, IV NS while NPO.  -Hold ASA and anti-hypertensives for now.  -Patient given pantoprazole IV, viscous lidocaine, and maalox in the ED. -Admit for GI consult and monitoring.  -Dr. Can from GI called by Dr. Keith.  -Trend H/H and transfuse for Hgb <8.   -Monitor vitals closely for hypotension.  -Keep NPO for now, IV NS while NPO.  -Hold ASA and anti-hypertensives for now.  -Patient given pantoprazole IV, viscous lidocaine, and maalox in the ED.  -C/w iron supplement.

## 2017-07-20 NOTE — H&P ADULT - NSHPREVIEWOFSYSTEMS_GEN_ALL_CORE
REVIEW OF SYSTEMS:    CONSTITUTIONAL: Weakness. Denies fever.  ENMT:  Throat pain present  EYES: No visual changes or photophobia  NECK: No pain or stiffness  RESPIRATORY: No cough, wheezing, hemoptysis; No shortness of breath  CARDIOVASCULAR: No chest pain or palpitations  GASTROINTESTINAL: Lower abdominal pain. Hematochezia and diarrhea present. Heartburn present. No nausea, vomiting, or hematemesis; No constipation.  MUSCULOSKELETAL: No joint swelling or warmth.  GENITOURINARY: No dysuria, frequency or hematuria  NEUROLOGICAL: Weakness. Right arm paralysis.  PSYCHIATRIC: Depression.  ENDOCRINE: No sx hypoglycemic episodes.  SKIN: No itching, burning, rashes, or lesions.

## 2017-07-20 NOTE — ED ADULT NURSE NOTE - CHPI ED SYMPTOMS NEG
no burning urination/no dysuria/no blood in stool/no nausea/no hematuria/no abdominal distension/no fever/no vomiting/no chills

## 2017-07-20 NOTE — H&P ADULT - NSHPLABSRESULTS_GEN_ALL_CORE
Labs:                        10.3   8.2   )-----------( 223      ( 20 Jul 2017 13:41 )             31.4     07-20    140  |  105  |  25<H>  ----------------------------<  104<H>  4.4   |  23  |  1.12    Ca    8.9      20 Jul 2017 13:41  Phos  3.3     07-20  Mg     1.8     07-20    TPro  6.7  /  Alb  3.7  /  TBili  0.3  /  DBili  x   /  AST  20  /  ALT  39  /  AlkPhos  92  07-20        PT/INR - ( 20 Jul 2017 13:41 )   PT: 12.3 sec;   INR: 1.13 ratio      PTT - ( 20 Jul 2017 13:41 )  PTT:29.7 sec    Imaging:    CT ANGIO ABD PELV (W)AW IC:           Impression: No GI bleed identified. Aortic dissection again noted, with   aneurysmal dilatation appearing stable since 5/20/2016.    I also personally reviewed the CT angio of Abd/pelvis. Labs:                        10.3   8.2   )-----------( 223      ( 20 Jul 2017 13:41 )             31.4     07-20    140  |  105  |  25<H>  ----------------------------<  104<H>  4.4   |  23  |  1.12    Ca    8.9      20 Jul 2017 13:41  Phos  3.3     07-20  Mg     1.8     07-20    TPro  6.7  /  Alb  3.7  /  TBili  0.3  /  DBili  x   /  AST  20  /  ALT  39  /  AlkPhos  92  07-20        PT/INR - ( 20 Jul 2017 13:41 )   PT: 12.3 sec;   INR: 1.13 ratio      PTT - ( 20 Jul 2017 13:41 )  PTT:29.7 sec    Imaging:    CT ANGIO ABD PELV (W)AW IC:           Impression: No GI bleed identified. Aortic dissection again noted, with   aneurysmal dilatation appearing stable since 5/20/2016.    I also personally reviewed the CT angio of Abd/pelvis.    EKG reviewed: NSR at 64bpm. No acute ST/T wave changes.

## 2017-07-20 NOTE — ED ADULT NURSE NOTE - OBJECTIVE STATEMENT
71 yo M came to ED c/o rectal bleed starting this morning.  A&Ox4.  Wife is present at bedside.  As per wife, pt had 3 bowel movements this morning that were dark and showed blood.  Pt has mild abdominal pain.  PMH includes HTN, AAA with surgical intervention, gout, seizures, BPH, and hypothyroid.  Denies CP, back pain, n/v, SOB, dizziness.  On exam, pt had slight tenderness, +2 peripheral pulses, slightly pale conjunctiva, skin w.d.i.  pt is resting with bed in the lowest position, side rails raised.  Wife present at the bedside.

## 2017-07-20 NOTE — H&P ADULT - NSHPPHYSICALEXAM_GEN_ALL_CORE
PHYSICAL EXAM:  Vital Signs Last 24 Hrs  T(C): 36.8 (07-20-17 @ 19:45)  T(F): 98.2 (07-20-17 @ 19:45), Max: 98.9 (07-20-17 @ 12:34)  HR: 86 (07-20-17 @ 19:45) (81 - 86)  BP: 146/80 (07-20-17 @ 19:45)  BP(mean): --  RR: 16 (07-20-17 @ 19:45) (16 - 16)  SpO2: 99% (07-20-17 @ 19:45) (98% - 99%)  Wt(kg): --    Constitutional: NAD, awake and alert  EYES: EOMI  ENT:  Normal Hearing, no tonsillar exudates, dry MM.  Neck: Soft and supple, No JVD  Respiratory: Mild wheeze.  Cardiovascular: S1S2 normal, regular rate and rhythm, no Murmurs, gallops or rubs  Gastrointestinal: Soft, mildly tender in lower abdomen, ventral hernia reducible. Healed surgical scar.  Extremities: No cyanosis or clubbing; warm to touch  Vascular: 2+ peripheral pulses lower ex  Neurological: AAO x 3. Right arm paralysis.  Musculoskeletal: Weak in extremities. No sensory deficits.  Skin: No rashes  Psych: no depression or anhedonia  HEME: no bruises, no nose bleeds

## 2017-07-20 NOTE — ED PROVIDER NOTE - MEDICAL DECISION MAKING DETAILS
Ivanna resident: 73 y/o with signifcant medical hx w/ full aorta dissection in 2014 s/p repair, hx of ischemic gut and trach w/ g tube (reversed) pw GI bleed - melena on exam - vital stable w/ no chest pain or SOB - will check labs, type, EKG, and d/w radiology the best imaging modality given patient's history Ivanna resident: 71 y/o with signifcant medical hx w/ full aorta dissection in 2014 s/p repair, hx of ischemic gut and trach w/ g tube (reversed) pw GI bleed - melena on exam - vital stable w/ no chest pain or SOB - will check labs, type, EKG, and d/w radiology the best imaging modality given patient's history  Turrin: See attending statement below

## 2017-07-20 NOTE — H&P ADULT - PSH
Ascending aortic dissection  s/p repair on 8/19/2013  H/O ischemic bowel disease  s/p resection in 2013  History of tracheostomy  on 8/2013 with decannulation  S/P ileostomy  exploratory laparotomy, creation of ileostomy and PEG on 8/24/13 due to ischemic bowel

## 2017-07-20 NOTE — H&P ADULT - PMH
Anoxic brain injury    Aortic aneurysm and dissection  type A with rupture 8/2013  Aortic aneurysm rupture  with residual Type B dissection  Aortic aneurysm, abdominal    BPH (Benign Prostatic Hyperplasia)    Coronary artery disease involving native heart with angina pectoris, unspecified vessel or lesion type  Non-obstructive CAD.  CVA (cerebral infarction)  with RUE weakness  Gastrostomy in place  surgical gastrostomy, NOT a PEG  Gout    HTN - Hypertension    Hypothyroid    Ischemic bowel disease  s/p exploratory laparotomy and creation of ileostomy 8/2013  Seizures  8/2014 on keppra last EEG - no activity Anoxic brain injury    Aortic aneurysm and dissection  type A with rupture 8/2013  Aortic aneurysm rupture  with residual Type B dissection  Aortic aneurysm, abdominal    BPH (Benign Prostatic Hyperplasia)    Coronary artery disease involving native heart with angina pectoris, unspecified vessel or lesion type  Non-obstructive CAD.  CVA (cerebral infarction)  with RUE weakness  Gastrostomy in place  surgical gastrostomy, NOT a PEG  Gout    Heartburn    HTN - Hypertension    Hypothyroid    Ischemic bowel disease  s/p exploratory laparotomy and creation of ileostomy 8/2013  Seizures  8/2014 on keppra last EEG - no activity

## 2017-07-20 NOTE — ED PROVIDER NOTE - PROGRESS NOTE DETAILS
ATTENDING MD Kin: H/H stable, no source identified on CTA, GI consulted, admitting to medicine. ATTENDING MD Kin: H/H baseline on draw, repeat CBC pending. no source identified on CTA, no aortoenteric fistula appreciated. GI consulted, admitting to medicine.

## 2017-07-20 NOTE — H&P ADULT - ASSESSMENT
72 year old male with PMH of aortic dissection complicated by ischemic bowel s/p resection, tracheostomy s/p reversal, ileostomy s/p reversal, right arm paralysis, HTN, gout, seizures, BPH, and hypothyroidism; presents after lower GI bleeding associated with abdominal pain starting this morning, prompting him to go to the ED.

## 2017-07-20 NOTE — ED PROVIDER NOTE - ATTENDING CONTRIBUTION TO CARE
69y M w PMH thoracic aortic dissection sp repair complicated by ischemic bowel, R sided paralysis, trach sp reversal, ileostomy/gastrostomy sp reversal, also w HTN, seizures here with BRBPR. Pt had 3 episodes of dark red blood per rectum today. No assoc abd pain, fever, chills, cp, palp, sob, dizziness.   Gen: WNWD NAD  HEENT: NCAT PERRL EOMI normal pharynx  Neck: supple  CV: RRR, no murmur  Lung: CTA BL  Abd: +BS soft NTND  Ext: wwp, palp pulses, FROMx4, no cce  Neuro: Awake alert CN grossly intact, sensation intact, contracture of R hand, mild weakness RUE and RLE (chronic), motor 5/5 LUE and LLE  AP: 69y M w PM thoracic aortic dissection on ASA here w BRBPR x3 today. HD stable. 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 Labs, CTAP, admit. 69y M w [facility] thoracic aortic dissection sp repair complicated by ischemic bowel, R sided paralysis, trach sp reversal, ileostomy/gastrostomy sp reversal, also w HTN, seizures here with BRBPR. Pt had 3 episodes of dark red blood per rectum today. Mild R sided abd pain. No fever, chills, cp, palp, sob, dizziness.   Gen: WNWD NAD  HEENT: NCAT PERRL EOMI normal pharynx  Neck: supple  CV: RRR, no murmur  Lung: CTA BL  Abd: +BS soft NTno palp masses mild R lower abd ttp   Ext: wwp, palp pulses, FROMx4, no cce  Neuro: Awake alert CN grossly intact, sensation intact, contracture of R hand, mild weakness RUE and RLE (chronic), motor 5/5 LUE and LLE  AP: 69y M w [facility] thoracic aortic dissection on ASA here w BRBPR x3 today. HD stable. 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 Labs, CTAP, admit.

## 2017-07-20 NOTE — H&P ADULT - NSHPSOCIALHISTORY_GEN_ALL_CORE
. Lives with his wife. From [place] originally. Was a physicist. Never smoked tobacco. Never drinks alcohol.

## 2017-07-20 NOTE — CONSULT NOTE ADULT - ASSESSMENT
Patient with brbpr X 3 today.  will need colonosocpy.  risk, benefits and alternatives discussed. wife at [facility], outlet bleed vs avm, diverticulosis vs ischemic colitis unlikely malignancy

## 2017-07-20 NOTE — ED PROVIDER NOTE - OBJECTIVE STATEMENT
70 y/o M with PMH of HTN, gout, seizures, BPH, hypothyroid, ruptured AAA in the past, recently admitted 27 days ago for atypical CP, currently on ASA p/w rectal bleed. Per patient, 70 y/o M with PMH of HTN, gout, seizures, BPH, hypothyroid, ruptured AAA in the past, recently discharged 27 days ago for atypical CP s/p Cath on 7/21/2017 with non-obstrictive disease w/ EF of 55% on ASA, p/w rectal bleed. Per patient, 68 y/o M with PMH of HTN, gout, seizures, BPH, hypothyroid, ruptured AAA in the past, recently discharged 27 days ago for atypical CP s/p Cath on 7/21/2017 with non-obstrictive disease w/ EF of 55% on ASA, p/w rectal bleed. Per patient, had 3 episodes of bright red blood per rectum. Denies any HA, CP, SOB, N/V. Does report vague right lower quadrant abdominal pain. Last colonoscopy 2-3 years ago.   PMD:  70 y/o M with PMH of HTN, gout, seizures, BPH, hypothyroid, ruptured AAA in the past c/b trach and ischemic gut with some bowel resection, recently discharged 27 days ago for atypical CP s/p Cath on 7/21/2017 with nonobstructive disease w/ EF of 55% on ASA, p/w rectal bleed. Per patient, had 3 episodes of bright red blood per rectum. Denies any HA, CP, SOB, N/V. Does report vague right lower quadrant abdominal pain. Last colonoscopy 2-3 years ago.   PMD:  70 y/o M with PMH of HTN, gout, seizures, BPH, hypothyroid, ruptured AAA in the past c/b trach and ischemic gut with some bowel resection, recently discharged 27 days ago for atypical CP s/p Cath on 7/21/2017 with nonobstructive disease w/ EF of 55% on ASA, p/w rectal bleed. Per patient, had 3 episodes of bright red blood per rectum. Denies any HA, CP, SOB, N/V. Does report vague right lower quadrant abdominal pain. Last colonoscopy 2-3 years ago. 73 y/o M with PMH of HTN, gout, seizures, BPH, hypothyroid, ruptured AAA in the past c/b trach and ischemic gut with some bowel resection, recently discharged 27 days ago for atypical CP s/p Cath on 7/21/2017 with nonobstructive disease w/ EF of 55% on ASA, p/w rectal bleed. Per patient, had 3 episodes of bright red blood per rectum. Denies any HA, CP, SOB, N/V. Does report vague right lower quadrant abdominal pain. Last colonoscopy 2-3 years ago.

## 2017-07-20 NOTE — CONSULT NOTE ADULT - SUBJECTIVE AND OBJECTIVE BOX
Patient is a 72y Male     Patient is a 72y old  Male who presents with a chief complaint of Lower GI bleeding (20 Jul 2017 20:05)      HPI:  72 year old male with PMH of aortic dissection complicated by ischemic bowel s/p resection, tracheostomy s/p reversal, ileostomy s/p reversal, right arm paralysis, HTN, gout, seizures, BPH, and hypothyroidism; presents after lower GI bleeding starting this morning. The patient had 3 episodes of dark red/brown liquid stools this morning, so his wife brought him to the ED. He also reports some fecal urgency, chronic diarrhea, and heartburn. He also has some lower abdominal pain. He has phlegm in this throat. He denies fever, SOB, HA, N/V, or dysuria. He doesn't eat much usually, but that hasn't changed recently. His wife says he hasn't had a seizure in a long time.  About a month ago (6/2017) he has atypical CP and had a cardiac cath which showed an EF of 55% and non-obstructive CAD.   Patient given pantoprazole IV, viscous lidocaine, and maalox in the ED. S/p 1L bolus of NS in the ED. (20 Jul 2017 20:05)      PAST MEDICAL & SURGICAL HISTORY:  Heartburn  Coronary artery disease involving native heart with angina pectoris, unspecified vessel or lesion type: Non-obstructive CAD.  Gastrostomy in place: surgical gastrostomy, NOT a PEG  Ischemic bowel disease: s/p exploratory laparotomy and creation of ileostomy 8/2013  Hypothyroid  Seizures: 8/2014 on keppra last EEG - no activity  CVA (cerebral infarction): with RUE weakness  Anoxic brain injury  Aortic aneurysm rupture: with residual Type B dissection  Aortic aneurysm, abdominal  Aortic aneurysm and dissection: type A with rupture 8/2013  Gout  BPH (Benign Prostatic Hyperplasia)  HTN - Hypertension  H/O ischemic bowel disease: s/p resection in 2013  History of tracheostomy: on 8/2013 with decannulation  Ascending aortic dissection: s/p repair on 8/19/2013  S/P ileostomy: exploratory laparotomy, creation of ileostomy and PEG on 8/24/13 due to ischemic bowel      MEDICATIONS  (STANDING):  sodium chloride 0.9%. 1000 milliLiter(s) (100 mL/Hr) IV Continuous <Continuous>  finasteride 5 milliGRAM(s) Oral daily  allopurinol 100 milliGRAM(s) Oral two times a day after meals  ferrous    sulfate 325 milliGRAM(s) Oral daily  pantoprazole    Tablet 40 milliGRAM(s) Oral before breakfast  levothyroxine 50 MICROGram(s) Oral daily      Allergies    penicillin (Pruritus)    Intolerances        SOCIAL HISTORY:  Denies ETOh,Smoking,     FAMILY HISTORY:  Family history of stroke  Family history of diabetes mellitus type II (Mother)      REVIEW OF SYSTEMS:    CONSTITUTIONAL: No weakness, fevers or chills  EYES/ENT: No visual changes;  No vertigo or throat pain   NECK: No pain or stiffness  RESPIRATORY: No cough, wheezing, hemoptysis; No shortness of breath  CARDIOVASCULAR: No chest pain or palpitations  GASTROINTESTINAL: No abdominal or epigastric pain. No nausea, vomiting, or hematemesis; No diarrhea or constipation. No melena or hematochezia.  GENITOURINARY: No dysuria, frequency or hematuria  NEUROLOGICAL: No numbness or weakness  SKIN: No itching, burning, rashes, or lesions   All other review of systems is negative unless indicated above.    VITAL:  T(C): , Max: 37.2 (07-20-17 @ 12:34)  T(F): , Max: 98.9 (07-20-17 @ 12:34)  HR: 71 (07-20-17 @ 20:50)  BP: 148/79 (07-20-17 @ 20:50)  BP(mean): --  RR: 18 (07-20-17 @ 20:50)  SpO2: 97% (07-20-17 @ 20:50)  Wt(kg): --    I and O's:        PHYSICAL EXAM:    Constitutional: NAD  HEENT: PERRLA,   Neck: No JVD  Respiratory: CTA B/L  Cardiovascular: S1 and S2  Gastrointestinal: BS+, soft, NT/ND  Extremities: No peripheral edema  Neurological: A/O x 3, no focal deficits  Psychiatric: Normal mood, normal affect  : No Howard  Skin: No rashes  Access: Not applicable  Back: No CVA tenderness    LABS:                        10.3   8.2   )-----------( 223      ( 20 Jul 2017 13:41 )             31.4     07-20    140  |  105  |  25<H>  ----------------------------<  104<H>  4.4   |  23  |  1.12    Ca    8.9      20 Jul 2017 13:41  Phos  3.3     07-20  Mg     1.8     07-20    TPro  6.7  /  Alb  3.7  /  TBili  0.3  /  DBili  x   /  AST  20  /  ALT  39  /  AlkPhos  92  07-20          RADIOLOGY & ADDITIONAL STUDIES:

## 2017-07-21 ENCOUNTER — RESULT REVIEW (OUTPATIENT)
Age: 72
End: 2017-07-21

## 2017-07-21 LAB
ANION GAP SERPL CALC-SCNC: 12 MMOL/L — SIGNIFICANT CHANGE UP (ref 5–17)
BUN SERPL-MCNC: 11 MG/DL — SIGNIFICANT CHANGE UP (ref 7–23)
CALCIUM SERPL-MCNC: 8.2 MG/DL — LOW (ref 8.4–10.5)
CHLORIDE SERPL-SCNC: 105 MMOL/L — SIGNIFICANT CHANGE UP (ref 96–108)
CO2 SERPL-SCNC: 23 MMOL/L — SIGNIFICANT CHANGE UP (ref 22–31)
CREAT SERPL-MCNC: 1.12 MG/DL — SIGNIFICANT CHANGE UP (ref 0.5–1.3)
GLUCOSE SERPL-MCNC: 94 MG/DL — SIGNIFICANT CHANGE UP (ref 70–99)
HCT VFR BLD CALC: 28.3 % — LOW (ref 39–50)
HCT VFR BLD CALC: 28.4 % — LOW (ref 39–50)
HCT VFR BLD CALC: 28.9 % — LOW (ref 39–50)
HGB BLD-MCNC: 9 G/DL — LOW (ref 13–17)
HGB BLD-MCNC: 9.4 G/DL — LOW (ref 13–17)
HGB BLD-MCNC: 9.5 G/DL — LOW (ref 13–17)
MCHC RBC-ENTMCNC: 25.9 PG — LOW (ref 27–34)
MCHC RBC-ENTMCNC: 27.9 PG — SIGNIFICANT CHANGE UP (ref 27–34)
MCHC RBC-ENTMCNC: 28 PG — SIGNIFICANT CHANGE UP (ref 27–34)
MCHC RBC-ENTMCNC: 31.8 GM/DL — LOW (ref 32–36)
MCHC RBC-ENTMCNC: 32.9 GM/DL — SIGNIFICANT CHANGE UP (ref 32–36)
MCHC RBC-ENTMCNC: 33.2 GM/DL — SIGNIFICANT CHANGE UP (ref 32–36)
MCV RBC AUTO: 81.6 FL — SIGNIFICANT CHANGE UP (ref 80–100)
MCV RBC AUTO: 84.3 FL — SIGNIFICANT CHANGE UP (ref 80–100)
MCV RBC AUTO: 84.8 FL — SIGNIFICANT CHANGE UP (ref 80–100)
PLATELET # BLD AUTO: 187 K/UL — SIGNIFICANT CHANGE UP (ref 150–400)
PLATELET # BLD AUTO: 199 K/UL — SIGNIFICANT CHANGE UP (ref 150–400)
PLATELET # BLD AUTO: 217 K/UL — SIGNIFICANT CHANGE UP (ref 150–400)
POTASSIUM SERPL-MCNC: 4.3 MMOL/L — SIGNIFICANT CHANGE UP (ref 3.5–5.3)
POTASSIUM SERPL-SCNC: 4.3 MMOL/L — SIGNIFICANT CHANGE UP (ref 3.5–5.3)
RBC # BLD: 3.37 M/UL — LOW (ref 4.2–5.8)
RBC # BLD: 3.41 M/UL — LOW (ref 4.2–5.8)
RBC # BLD: 3.47 M/UL — LOW (ref 4.2–5.8)
RBC # FLD: 14.2 % — SIGNIFICANT CHANGE UP (ref 10.3–14.5)
RBC # FLD: 14.4 % — SIGNIFICANT CHANGE UP (ref 10.3–14.5)
RBC # FLD: 16.1 % — HIGH (ref 10.3–14.5)
SODIUM SERPL-SCNC: 140 MMOL/L — SIGNIFICANT CHANGE UP (ref 135–145)
WBC # BLD: 6.4 K/UL — SIGNIFICANT CHANGE UP (ref 3.8–10.5)
WBC # BLD: 6.4 K/UL — SIGNIFICANT CHANGE UP (ref 3.8–10.5)
WBC # BLD: 7 K/UL — SIGNIFICANT CHANGE UP (ref 3.8–10.5)
WBC # FLD AUTO: 6.4 K/UL — SIGNIFICANT CHANGE UP (ref 3.8–10.5)
WBC # FLD AUTO: 6.4 K/UL — SIGNIFICANT CHANGE UP (ref 3.8–10.5)
WBC # FLD AUTO: 7 K/UL — SIGNIFICANT CHANGE UP (ref 3.8–10.5)

## 2017-07-21 RX ORDER — TAMSULOSIN HYDROCHLORIDE 0.4 MG/1
0.4 CAPSULE ORAL AT BEDTIME
Qty: 0 | Refills: 0 | Status: DISCONTINUED | OUTPATIENT
Start: 2017-07-21 | End: 2017-07-30

## 2017-07-21 RX ADMIN — FINASTERIDE 5 MILLIGRAM(S): 5 TABLET, FILM COATED ORAL at 11:36

## 2017-07-21 RX ADMIN — TAMSULOSIN HYDROCHLORIDE 0.4 MILLIGRAM(S): 0.4 CAPSULE ORAL at 22:14

## 2017-07-21 RX ADMIN — Medication 325 MILLIGRAM(S): at 11:36

## 2017-07-21 RX ADMIN — Medication 50 MICROGRAM(S): at 06:25

## 2017-07-21 RX ADMIN — Medication 100 MILLIGRAM(S): at 17:52

## 2017-07-21 RX ADMIN — SODIUM CHLORIDE 100 MILLILITER(S): 9 INJECTION INTRAMUSCULAR; INTRAVENOUS; SUBCUTANEOUS at 22:14

## 2017-07-21 RX ADMIN — Medication 10 MILLIGRAM(S): at 22:14

## 2017-07-21 RX ADMIN — SODIUM CHLORIDE 100 MILLILITER(S): 9 INJECTION INTRAMUSCULAR; INTRAVENOUS; SUBCUTANEOUS at 06:24

## 2017-07-21 RX ADMIN — PANTOPRAZOLE SODIUM 40 MILLIGRAM(S): 20 TABLET, DELAYED RELEASE ORAL at 06:25

## 2017-07-21 RX ADMIN — Medication 100 MILLIGRAM(S): at 09:20

## 2017-07-21 NOTE — PROGRESS NOTE ADULT - SUBJECTIVE AND OBJECTIVE BOX
CHIEF COMPLAINT:Patient is a 72y old  Male who presents with a chief complaint of BLOODY STOOL X3 (20 Jul 2017 21:22)    	  Interval history: no more bleeding      Allergies:  penicillin (Pruritus)      PAST MEDICAL & SURGICAL HISTORY:  Heartburn  Coronary artery disease involving native heart with angina pectoris, unspecified vessel or lesion type: Non-obstructive CAD.  Gastrostomy in place: surgical gastrostomy, NOT a PEG  Ischemic bowel disease: s/p exploratory laparotomy and creation of ileostomy 8/2013  Hypothyroid  Seizures: 8/2014 on keppra last EEG - no activity  CVA (cerebral infarction): with RUE weakness  Anoxic brain injury  Aortic aneurysm rupture: with residual Type B dissection  Aortic aneurysm, abdominal  Aortic aneurysm and dissection: type A with rupture 8/2013  Gout  BPH (Benign Prostatic Hyperplasia)  HTN - Hypertension  H/O ischemic bowel disease: s/p resection in 2013  History of tracheostomy: on 8/2013 with decannulation  Ascending aortic dissection: s/p repair on 8/19/2013  S/P ileostomy: exploratory laparotomy, creation of ileostomy and PEG on 8/24/13 due to ischemic bowel      FAMILY HISTORY:  Family history of stroke  Family history of diabetes mellitus type II (Mother)      REVIEW OF SYSTEMS:  CONSTITUTIONAL: No fever, weight loss, or fatigue  EYES: No eye pain, visual disturbances, or discharge  NECK: No pain or stiffness  RESPIRATORY: No cough or wheezing, no shortness of breath  CARDIOVASCULAR: No chest pain, palpitations, dizziness, or leg swelling  GASTROINTESTINAL: No abdominal or epigastric pain. No nausea, vomiting, diarrhea or constipation  GENITOURINARY: No dysuria, urinary frequency or urgency, no hematuria  NEUROLOGICAL: No headaches, memory loss, loss of strength, numbness, or tremors  SKIN: No itching, burning, rashes, or lesions   MUSCULOSKELETAL: No joint pain or swelling; No muscle, back, or extremity pain    Medications:  MEDICATIONS  (STANDING):  sodium chloride 0.9%. 1000 milliLiter(s) (100 mL/Hr) IV Continuous <Continuous>  finasteride 5 milliGRAM(s) Oral daily  allopurinol 100 milliGRAM(s) Oral two times a day after meals  ferrous    sulfate 325 milliGRAM(s) Oral daily  pantoprazole    Tablet 40 milliGRAM(s) Oral before breakfast  levothyroxine 50 MICROGram(s) Oral daily  bisacodyl 10 milliGRAM(s) Oral at bedtime  tamsulosin 0.4 milliGRAM(s) Oral at bedtime    MEDICATIONS  (PRN):    	    PHYSICAL EXAM:  T(C): 36.9 (07-21-17 @ 21:10), Max: 37.4 (07-21-17 @ 01:26)  HR: 77 (07-21-17 @ 21:10) (70 - 77)  BP: 122/68 (07-21-17 @ 21:10) (122/68 - 132/87)  RR: 18 (07-21-17 @ 21:10) (17 - 18)  SpO2: 95% (07-21-17 @ 21:10) (95% - 97%)  Wt(kg): --  I&O's Summary      Constitutional: NAD, awake and alert  EYES: EOMI  ENT:  Normal Hearing, no tonsillar exudates, dry MM.  Neck: Soft and supple, No JVD  Respiratory: CTA BL  Cardiovascular: S1S2 normal, regular rate and rhythm, no Murmurs, gallops or rubs  Gastrointestinal: Soft, mildly tender in lower abdomen, ventral hernia reducible. Healed surgical scar.  Extremities: No cyanosis or clubbing; warm to touch  Vascular: 2+ peripheral pulses lower ex  Neurological: AAO x 3. Right arm paralysis.  Musculoskeletal: Weak in extremities. No sensory deficits.  Skin: No rashes  Psych: no depression or anhedonia  HEME: no bruises, no nose bleeds    	  LABS:	 	    CARDIAC MARKERS:                                9.5    6.4   )-----------( 187      ( 21 Jul 2017 06:19 )             28.9     07-21    140  |  105  |  11  ----------------------------<  94  4.3   |  23  |  1.12    Ca    8.2<L>      21 Jul 2017 07:18  Phos  3.3     07-20  Mg     1.8     07-20    TPro  6.7  /  Alb  3.7  /  TBili  0.3  /  DBili  x   /  AST  20  /  ALT  39  /  AlkPhos  92  07-20    proBNP:   Lipid Profile:   HgA1c:   TSH:

## 2017-07-21 NOTE — PROGRESS NOTE ADULT - ASSESSMENT
73 yo M as above:  1. Rectal bleeding - currently resolved, s/p EGD/colon, found to have duodenal angiodysplasia s/p coagulation, c/w PPI, GI f/u, trend CBC  2. Hx of aortic disection - no acute disection on CT  3. HTN - BP at goal  4. Hypothyroidism - c/w Synthroid  5. Gout - c/w Allopurinol  6. BPH - c/w Finasteride, Flomax on hold in view of bleed poss hypotension, monitor for retention  7. Mechanical DVT prophylaxis

## 2017-07-21 NOTE — PROGRESS NOTE ADULT - SUBJECTIVE AND OBJECTIVE BOX
Pre-Endoscopy Evaluation      Referring Physician:  Amador Keith MD                                 Procedure: Colonoscopy    Indication for Procedure: GIB    Pertinent History: 73 yo male with extensive pmh below admitted with BRBPR    Sedation by Anesthesia [x]    PAST MEDICAL & SURGICAL HISTORY:  Heartburn  Coronary artery disease involving native heart with angina pectoris, unspecified vessel or lesion type: Non-obstructive CAD.  Gastrostomy in place: surgical gastrostomy, NOT a PEG  Ischemic bowel disease: s/p exploratory laparotomy and creation of ileostomy 8/2013  Hypothyroid  Seizures: 8/2014 on keppra last EEG - no activity  CVA (cerebral infarction): with RUE weakness  Anoxic brain injury  Aortic aneurysm rupture: with residual Type B dissection  Aortic aneurysm, abdominal  Aortic aneurysm and dissection: type A with rupture 8/2013  Gout  BPH (Benign Prostatic Hyperplasia)  HTN - Hypertension  H/O ischemic bowel disease: s/p resection in 2013  History of tracheostomy: on 8/2013 with decannulation  Ascending aortic dissection: s/p repair on 8/19/2013  S/P ileostomy: exploratory laparotomy, creation of ileostomy and PEG on 8/24/13 due to ischemic bowel      PMH of Gastroparesis [ ]  Gastric Surgery [x]  Gastric Outlet Obstruction [ ]    Allergies:    penicillin (Pruritus)    Latex allergy: [ ] yes [x] no    Medications:MEDICATIONS  (STANDING):  sodium chloride 0.9%. 1000 milliLiter(s) (100 mL/Hr) IV Continuous <Continuous>  finasteride 5 milliGRAM(s) Oral daily  allopurinol 100 milliGRAM(s) Oral two times a day after meals  ferrous    sulfate 325 milliGRAM(s) Oral daily  pantoprazole    Tablet 40 milliGRAM(s) Oral before breakfast  levothyroxine 50 MICROGram(s) Oral daily  bisacodyl 10 milliGRAM(s) Oral at bedtime  tamsulosin 0.4 milliGRAM(s) Oral at bedtime    MEDICATIONS  (PRN):      Smoking: [ ] yes  [x] no    AICD/PPM: [ ] yes   [x] no    Pertinent lab data:                        9.5    6.4   )-----------( 187      ( 21 Jul 2017 06:19 )             28.9     07-21    140  |  105  |  11  ----------------------------<  94  4.3   |  23  |  1.12    Ca    8.2<L>      21 Jul 2017 07:18  Phos  3.3     07-20  Mg     1.8     07-20    TPro  6.7  /  Alb  3.7  /  TBili  0.3  /  DBili  x   /  AST  20  /  ALT  39  /  AlkPhos  92  07-20    PT/INR - ( 20 Jul 2017 13:41 )   PT: 12.3 sec;   INR: 1.13 ratio       PTT - ( 20 Jul 2017 13:41 )  PTT:29.7 sec      Physical Examination:  Daily Height in cm: 172.72 (21 Jul 2017 07:46)      Vital Signs Last 24 Hrs  T(C): 36.7 (21 Jul 2017 12:40), Max: 37.4 (21 Jul 2017 01:26)  T(F): 98.1 (21 Jul 2017 12:40), Max: 99.3 (21 Jul 2017 01:26)  HR: 70 (21 Jul 2017 12:40) (70 - 86)  BP: 128/72 (21 Jul 2017 12:40) (128/72 - 148/79)  BP(mean): --  RR: 18 (21 Jul 2017 12:40) (16 - 18)  SpO2: 97% (21 Jul 2017 12:40) (96% - 99%)    BP:                 HR:                  SPO2:               Temperature:    Constitutional: NAD    HEENT: PERRLA, EOMI,       Neck:  No JVD    Respiratory: CTAB/L    Cardiovascular: S1 and S2    Gastrointestinal: BS+, soft, NT/ND    Extremities: No peripheral edema    Comments:    ASA Class: I [ ]  II [ ]  III [ ]  IV [x]    The patient is a suitable candidate for the planned procedure unless box checked [ ]  No, explain:

## 2017-07-22 LAB
APPEARANCE UR: CLEAR — SIGNIFICANT CHANGE UP
BILIRUB UR-MCNC: NEGATIVE — SIGNIFICANT CHANGE UP
COLOR SPEC: SIGNIFICANT CHANGE UP
DIFF PNL FLD: NEGATIVE — SIGNIFICANT CHANGE UP
GLUCOSE UR QL: NEGATIVE — SIGNIFICANT CHANGE UP
HCT VFR BLD CALC: 18 % — CRITICAL LOW (ref 39–50)
HCT VFR BLD CALC: 26.7 % — LOW (ref 39–50)
HCT VFR BLD CALC: 27.1 % — LOW (ref 39–50)
HCT VFR BLD CALC: 27.3 % — LOW (ref 39–50)
HGB BLD-MCNC: 6.2 G/DL — CRITICAL LOW (ref 13–17)
HGB BLD-MCNC: 8.3 G/DL — LOW (ref 13–17)
HGB BLD-MCNC: 8.9 G/DL — LOW (ref 13–17)
HGB BLD-MCNC: 9 G/DL — LOW (ref 13–17)
KETONES UR-MCNC: ABNORMAL
LEUKOCYTE ESTERASE UR-ACNC: NEGATIVE — SIGNIFICANT CHANGE UP
MCHC RBC-ENTMCNC: 25.9 PG — LOW (ref 27–34)
MCHC RBC-ENTMCNC: 28.2 PG — SIGNIFICANT CHANGE UP (ref 27–34)
MCHC RBC-ENTMCNC: 28.3 PG — SIGNIFICANT CHANGE UP (ref 27–34)
MCHC RBC-ENTMCNC: 29.5 PG — SIGNIFICANT CHANGE UP (ref 27–34)
MCHC RBC-ENTMCNC: 31.1 GM/DL — LOW (ref 32–36)
MCHC RBC-ENTMCNC: 32.6 GM/DL — SIGNIFICANT CHANGE UP (ref 32–36)
MCHC RBC-ENTMCNC: 33.1 GM/DL — SIGNIFICANT CHANGE UP (ref 32–36)
MCHC RBC-ENTMCNC: 34.1 GM/DL — SIGNIFICANT CHANGE UP (ref 32–36)
MCV RBC AUTO: 83.4 FL — SIGNIFICANT CHANGE UP (ref 80–100)
MCV RBC AUTO: 85.6 FL — SIGNIFICANT CHANGE UP (ref 80–100)
MCV RBC AUTO: 86.2 FL — SIGNIFICANT CHANGE UP (ref 80–100)
MCV RBC AUTO: 86.3 FL — SIGNIFICANT CHANGE UP (ref 80–100)
NITRITE UR-MCNC: NEGATIVE — SIGNIFICANT CHANGE UP
PH UR: 6.5 — SIGNIFICANT CHANGE UP (ref 5–8)
PLATELET # BLD AUTO: 145 K/UL — LOW (ref 150–400)
PLATELET # BLD AUTO: 189 K/UL — SIGNIFICANT CHANGE UP (ref 150–400)
PLATELET # BLD AUTO: 194 K/UL — SIGNIFICANT CHANGE UP (ref 150–400)
PLATELET # BLD AUTO: 221 K/UL — SIGNIFICANT CHANGE UP (ref 150–400)
PROT UR-MCNC: NEGATIVE — SIGNIFICANT CHANGE UP
RBC # BLD: 2.09 M/UL — LOW (ref 4.2–5.8)
RBC # BLD: 3.17 M/UL — LOW (ref 4.2–5.8)
RBC # BLD: 3.17 M/UL — LOW (ref 4.2–5.8)
RBC # BLD: 3.2 M/UL — LOW (ref 4.2–5.8)
RBC # FLD: 14.3 % — SIGNIFICANT CHANGE UP (ref 10.3–14.5)
RBC # FLD: 14.4 % — SIGNIFICANT CHANGE UP (ref 10.3–14.5)
RBC # FLD: 14.4 % — SIGNIFICANT CHANGE UP (ref 10.3–14.5)
RBC # FLD: 16.2 % — HIGH (ref 10.3–14.5)
SP GR SPEC: 1.02 — SIGNIFICANT CHANGE UP (ref 1.01–1.02)
UROBILINOGEN FLD QL: NEGATIVE — SIGNIFICANT CHANGE UP
WBC # BLD: 4.5 K/UL — SIGNIFICANT CHANGE UP (ref 3.8–10.5)
WBC # BLD: 4.9 K/UL — SIGNIFICANT CHANGE UP (ref 3.8–10.5)
WBC # BLD: 5.03 K/UL — SIGNIFICANT CHANGE UP (ref 3.8–10.5)
WBC # BLD: 7.6 K/UL — SIGNIFICANT CHANGE UP (ref 3.8–10.5)
WBC # FLD AUTO: 4.5 K/UL — SIGNIFICANT CHANGE UP (ref 3.8–10.5)
WBC # FLD AUTO: 4.9 K/UL — SIGNIFICANT CHANGE UP (ref 3.8–10.5)
WBC # FLD AUTO: 5.03 K/UL — SIGNIFICANT CHANGE UP (ref 3.8–10.5)
WBC # FLD AUTO: 7.6 K/UL — SIGNIFICANT CHANGE UP (ref 3.8–10.5)

## 2017-07-22 RX ADMIN — FINASTERIDE 5 MILLIGRAM(S): 5 TABLET, FILM COATED ORAL at 12:07

## 2017-07-22 RX ADMIN — SODIUM CHLORIDE 100 MILLILITER(S): 9 INJECTION INTRAMUSCULAR; INTRAVENOUS; SUBCUTANEOUS at 21:20

## 2017-07-22 RX ADMIN — SODIUM CHLORIDE 100 MILLILITER(S): 9 INJECTION INTRAMUSCULAR; INTRAVENOUS; SUBCUTANEOUS at 05:46

## 2017-07-22 RX ADMIN — Medication 325 MILLIGRAM(S): at 12:07

## 2017-07-22 RX ADMIN — TAMSULOSIN HYDROCHLORIDE 0.4 MILLIGRAM(S): 0.4 CAPSULE ORAL at 21:20

## 2017-07-22 RX ADMIN — Medication 50 MICROGRAM(S): at 05:47

## 2017-07-22 RX ADMIN — Medication 100 MILLIGRAM(S): at 10:07

## 2017-07-22 RX ADMIN — SODIUM CHLORIDE 100 MILLILITER(S): 9 INJECTION INTRAMUSCULAR; INTRAVENOUS; SUBCUTANEOUS at 10:07

## 2017-07-22 RX ADMIN — SODIUM CHLORIDE 100 MILLILITER(S): 9 INJECTION INTRAMUSCULAR; INTRAVENOUS; SUBCUTANEOUS at 18:17

## 2017-07-22 RX ADMIN — Medication 10 MILLIGRAM(S): at 21:20

## 2017-07-22 RX ADMIN — Medication 100 MILLIGRAM(S): at 18:17

## 2017-07-22 RX ADMIN — PANTOPRAZOLE SODIUM 40 MILLIGRAM(S): 20 TABLET, DELAYED RELEASE ORAL at 07:33

## 2017-07-22 NOTE — PROGRESS NOTE ADULT - SUBJECTIVE AND OBJECTIVE BOX
CHIEF COMPLAINT:Patient is a 72y old  Male who presents with a chief complaint of BLOODY STOOL X3 (20 Jul 2017 21:22)  no new complaints  	        PAST MEDICAL & SURGICAL HISTORY:  Heartburn  Coronary artery disease involving native heart with angina pectoris, unspecified vessel or lesion type: Non-obstructive CAD.  Gastrostomy in place: surgical gastrostomy, NOT a PEG  Ischemic bowel disease: s/p exploratory laparotomy and creation of ileostomy 8/2013  Hypothyroid  Seizures: 8/2014 on keppra last EEG - no activity  CVA (cerebral infarction): with RUE weakness  Anoxic brain injury  Aortic aneurysm rupture: with residual Type B dissection  Aortic aneurysm, abdominal  Aortic aneurysm and dissection: type A with rupture 8/2013  Gout  BPH (Benign Prostatic Hyperplasia)  HTN - Hypertension  H/O ischemic bowel disease: s/p resection in 2013  History of tracheostomy: on 8/2013 with decannulation  Ascending aortic dissection: s/p repair on 8/19/2013  S/P ileostomy: exploratory laparotomy, creation of ileostomy and PEG on 8/24/13 due to ischemic bowel          REVIEW OF SYSTEMS:  CONSTITUTIONAL: No fever, weight loss, or fatigue  EYES: No eye pain, visual disturbances, or discharge  NECK: No pain or stiffness  RESPIRATORY: No cough, wheezing, chills or hemoptysis; No Shortness of Breath  CARDIOVASCULAR: No chest pain, palpitations, passing out, dizziness, or leg swelling  GASTROINTESTINAL: No abdominal or epigastric pain. No nausea, vomiting, or hematemesis; No diarrhea or constipation. No melena or hematochezia.  GENITOURINARY: No dysuria, frequency, hematuria, or incontinence  NEUROLOGICAL: No headaches, memory loss, loss of strength, numbness, or tremors  SKIN: No itching, burning, rashes, or lesions   LYMPH Nodes: No enlarged glands  ENDOCRINE: No heat or cold intolerance; No hair loss  MUSCULOSKELETAL: No joint pain or swelling; No muscle, back, or extremity pain    Medications:  MEDICATIONS  (STANDING):  sodium chloride 0.9%. 1000 milliLiter(s) (100 mL/Hr) IV Continuous <Continuous>  finasteride 5 milliGRAM(s) Oral daily  allopurinol 100 milliGRAM(s) Oral two times a day after meals  ferrous    sulfate 325 milliGRAM(s) Oral daily  pantoprazole    Tablet 40 milliGRAM(s) Oral before breakfast  levothyroxine 50 MICROGram(s) Oral daily  bisacodyl 10 milliGRAM(s) Oral at bedtime  tamsulosin 0.4 milliGRAM(s) Oral at bedtime    MEDICATIONS  (PRN):    	    PHYSICAL EXAM:  T(C): 36.7 (07-22-17 @ 04:18), Max: 36.9 (07-21-17 @ 21:10)  HR: 78 (07-22-17 @ 04:18) (70 - 78)  BP: 129/69 (07-22-17 @ 04:18) (122/68 - 130/70)  RR: 18 (07-22-17 @ 04:18) (18 - 18)  SpO2: 96% (07-22-17 @ 04:18) (95% - 97%)  Wt(kg): --  I&O's Summary      Appearance: Normal	  HEENT:   Normal oral mucosa, PERRL, EOMI	  Lymphatic: No lymphadenopathy  Cardiovascular: Normal S1 S2, No JVD, No murmurs, No edema  Respiratory: Lungs clear to auscultation	  Psychiatry: A & O x 3, Mood & affect appropriate  Gastrointestinal:  Soft, Non-tender, + BS	  Skin: No rashes, No ecchymoses, No cyanosis	  Neurologic: Non-focal  Extremities: Normal range of motion, No clubbing, cyanosis or edema  Vascular: Peripheral pulses palpable 2+ bilaterally    TELEMETRY: 	    ECG:  	  RADIOLOGY:  OTHER: 	  	  LABS:	 	    CARDIAC MARKERS:                                9.0    4.9   )-----------( 189      ( 22 Jul 2017 02:11 )             27.1     07-21    140  |  105  |  11  ----------------------------<  94  4.3   |  23  |  1.12    Ca    8.2<L>      21 Jul 2017 07:18  Phos  3.3     07-20  Mg     1.8     07-20    TPro  6.7  /  Alb  3.7  /  TBili  0.3  /  DBili  x   /  AST  20  /  ALT  39  /  AlkPhos  92  07-20    proBNP:   Lipid Profile:   HgA1c:   TSH:

## 2017-07-22 NOTE — PROGRESS NOTE ADULT - ASSESSMENT
71 yo Male  as above:  1. Rectal bleeding - currently resolved, s/p EGD/colon, found to have duodenal angiodysplasia s/p coagulation, c/w PPI, GI f/u, trend CBC  2. Hx of aortic disection - no acute disection on CT  3. HTN - BP at goal  4. Hypothyroidism - c/w Synthroid  5. Gout - c/w Allopurinol  6. BPH - c/w Finasteride, Flomax on hold in view of bleed poss hypotension, monitor for retention  d/c if ok w/ gi   7. Mechanical DVT prophylaxis

## 2017-07-22 NOTE — PROGRESS NOTE ADULT - ASSESSMENT
colon s/p right hemicolectomy (wife not aware of surgery before procerures)  small ulcer, bleeding avm in duodenum.  pt reports dark stool.  would observe one more day.  capsule if dec h/h or bleeding

## 2017-07-22 NOTE — PROGRESS NOTE ADULT - SUBJECTIVE AND OBJECTIVE BOX
KATHY ESCALERA:[id number],   72yMale followed for:gib  penicillin (Pruritus)    PAST MEDICAL & SURGICAL HISTORY:  Heartburn  Coronary artery disease involving native heart with angina pectoris, unspecified vessel or lesion type: Non-obstructive CAD.  Gastrostomy in place: surgical gastrostomy, NOT a PEG  Ischemic bowel disease: s/p exploratory laparotomy and creation of ileostomy 8/2013  Hypothyroid  Seizures: 8/2014 on keppra last EEG - no activity  CVA (cerebral infarction): with RUE weakness  Anoxic brain injury  Aortic aneurysm rupture: with residual Type B dissection  Aortic aneurysm, abdominal  Aortic aneurysm and dissection: type A with rupture 8/2013  Gout  BPH (Benign Prostatic Hyperplasia)  HTN - Hypertension  H/O ischemic bowel disease: s/p resection in 2013  History of tracheostomy: on 8/2013 with decannulation  Ascending aortic dissection: s/p repair on 8/19/2013  S/P ileostomy: exploratory laparotomy, creation of ileostomy and PEG on 8/24/13 due to ischemic bowel    FAMILY HISTORY:  Family history of stroke  Family history of diabetes mellitus type II (Mother)    MEDICATIONS  (STANDING):  sodium chloride 0.9%. 1000 milliLiter(s) (100 mL/Hr) IV Continuous <Continuous>  finasteride 5 milliGRAM(s) Oral daily  allopurinol 100 milliGRAM(s) Oral two times a day after meals  ferrous    sulfate 325 milliGRAM(s) Oral daily  pantoprazole    Tablet 40 milliGRAM(s) Oral before breakfast  levothyroxine 50 MICROGram(s) Oral daily  bisacodyl 10 milliGRAM(s) Oral at bedtime  tamsulosin 0.4 milliGRAM(s) Oral at bedtime    MEDICATIONS  (PRN):      Vital Signs Last 24 Hrs  T(C): 36.7 (22 Jul 2017 04:18), Max: 36.9 (21 Jul 2017 21:10)  T(F): 98.1 (22 Jul 2017 04:18), Max: 98.4 (21 Jul 2017 21:10)  HR: 78 (22 Jul 2017 04:18) (70 - 78)  BP: 129/69 (22 Jul 2017 04:18) (122/68 - 130/70)  BP(mean): --  RR: 18 (22 Jul 2017 04:18) (18 - 18)  SpO2: 96% (22 Jul 2017 04:18) (95% - 97%)  nc/at  s1s2  cta  soft, nt, nd no guarding or rebound  no c/c/e    CBC Full  -  ( 22 Jul 2017 08:58 )  WBC Count : 5.03 K/uL  Hemoglobin : 8.3 g/dL  Hematocrit : 26.7 %  Platelet Count - Automated : 194 K/uL  Mean Cell Volume : 83.4 fl  Mean Cell Hemoglobin : 25.9 pg  Mean Cell Hemoglobin Concentration : 31.1 gm/dL  Auto Neutrophil # : x  Auto Lymphocyte # : x  Auto Monocyte # : x  Auto Eosinophil # : x  Auto Basophil # : x  Auto Neutrophil % : x  Auto Lymphocyte % : x  Auto Monocyte % : x  Auto Eosinophil % : x  Auto Basophil % : x    07-21    140  |  105  |  11  ----------------------------<  94  4.3   |  23  |  1.12    Ca    8.2<L>      21 Jul 2017 07:18  Phos  3.3     07-20  Mg     1.8     07-20    TPro  6.7  /  Alb  3.7  /  TBili  0.3  /  DBili  x   /  AST  20  /  ALT  39  /  AlkPhos  92  07-20    PT/INR - ( 20 Jul 2017 13:41 )   PT: 12.3 sec;   INR: 1.13 ratio         PTT - ( 20 Jul 2017 13:41 )  PTT:29.7 sec

## 2017-07-23 LAB
APPEARANCE UR: CLEAR — SIGNIFICANT CHANGE UP
BILIRUB UR-MCNC: NEGATIVE — SIGNIFICANT CHANGE UP
COLOR SPEC: COLORLESS — SIGNIFICANT CHANGE UP
DIFF PNL FLD: NEGATIVE — SIGNIFICANT CHANGE UP
GLUCOSE UR QL: NEGATIVE — SIGNIFICANT CHANGE UP
HCT VFR BLD CALC: 22.1 % — LOW (ref 39–50)
HCT VFR BLD CALC: 25.8 % — LOW (ref 39–50)
HGB BLD-MCNC: 7.1 G/DL — LOW (ref 13–17)
HGB BLD-MCNC: 8.9 G/DL — LOW (ref 13–17)
KETONES UR-MCNC: NEGATIVE — SIGNIFICANT CHANGE UP
LEUKOCYTE ESTERASE UR-ACNC: NEGATIVE — SIGNIFICANT CHANGE UP
MCHC RBC-ENTMCNC: 26.6 PG — LOW (ref 27–34)
MCHC RBC-ENTMCNC: 29.9 PG — SIGNIFICANT CHANGE UP (ref 27–34)
MCHC RBC-ENTMCNC: 32.1 GM/DL — SIGNIFICANT CHANGE UP (ref 32–36)
MCHC RBC-ENTMCNC: 34.3 GM/DL — SIGNIFICANT CHANGE UP (ref 32–36)
MCV RBC AUTO: 82.8 FL — SIGNIFICANT CHANGE UP (ref 80–100)
MCV RBC AUTO: 87 FL — SIGNIFICANT CHANGE UP (ref 80–100)
NITRITE UR-MCNC: NEGATIVE — SIGNIFICANT CHANGE UP
PH UR: 6.5 — SIGNIFICANT CHANGE UP (ref 5–8)
PLATELET # BLD AUTO: 175 K/UL — SIGNIFICANT CHANGE UP (ref 150–400)
PLATELET # BLD AUTO: 207 K/UL — SIGNIFICANT CHANGE UP (ref 150–400)
PROT UR-MCNC: NEGATIVE — SIGNIFICANT CHANGE UP
RBC # BLD: 2.67 M/UL — LOW (ref 4.2–5.8)
RBC # BLD: 2.97 M/UL — LOW (ref 4.2–5.8)
RBC # FLD: 14.2 % — SIGNIFICANT CHANGE UP (ref 10.3–14.5)
RBC # FLD: 16.3 % — HIGH (ref 10.3–14.5)
SP GR SPEC: 1.01 — SIGNIFICANT CHANGE UP (ref 1.01–1.02)
UROBILINOGEN FLD QL: NEGATIVE — SIGNIFICANT CHANGE UP
WBC # BLD: 6.15 K/UL — SIGNIFICANT CHANGE UP (ref 3.8–10.5)
WBC # BLD: 6.9 K/UL — SIGNIFICANT CHANGE UP (ref 3.8–10.5)
WBC # FLD AUTO: 6.15 K/UL — SIGNIFICANT CHANGE UP (ref 3.8–10.5)
WBC # FLD AUTO: 6.9 K/UL — SIGNIFICANT CHANGE UP (ref 3.8–10.5)

## 2017-07-23 RX ORDER — PANTOPRAZOLE SODIUM 20 MG/1
8 TABLET, DELAYED RELEASE ORAL
Qty: 80 | Refills: 0 | Status: DISCONTINUED | OUTPATIENT
Start: 2017-07-23 | End: 2017-07-30

## 2017-07-23 RX ADMIN — Medication 100 MILLIGRAM(S): at 08:38

## 2017-07-23 RX ADMIN — PANTOPRAZOLE SODIUM 10 MG/HR: 20 TABLET, DELAYED RELEASE ORAL at 21:43

## 2017-07-23 RX ADMIN — TAMSULOSIN HYDROCHLORIDE 0.4 MILLIGRAM(S): 0.4 CAPSULE ORAL at 21:42

## 2017-07-23 RX ADMIN — Medication 100 MILLIGRAM(S): at 18:15

## 2017-07-23 RX ADMIN — Medication 50 MICROGRAM(S): at 06:04

## 2017-07-23 RX ADMIN — SODIUM CHLORIDE 100 MILLILITER(S): 9 INJECTION INTRAMUSCULAR; INTRAVENOUS; SUBCUTANEOUS at 08:38

## 2017-07-23 RX ADMIN — PANTOPRAZOLE SODIUM 10 MG/HR: 20 TABLET, DELAYED RELEASE ORAL at 11:06

## 2017-07-23 RX ADMIN — FINASTERIDE 5 MILLIGRAM(S): 5 TABLET, FILM COATED ORAL at 12:26

## 2017-07-23 RX ADMIN — PANTOPRAZOLE SODIUM 40 MILLIGRAM(S): 20 TABLET, DELAYED RELEASE ORAL at 06:04

## 2017-07-23 RX ADMIN — SODIUM CHLORIDE 100 MILLILITER(S): 9 INJECTION INTRAMUSCULAR; INTRAVENOUS; SUBCUTANEOUS at 06:04

## 2017-07-23 NOTE — PROGRESS NOTE ADULT - ASSESSMENT
difficult to interpert data.  ? lab error on labs.  Patient reports mulitple dark bm but reported before procedure and no dark material on colonoscopy.  avm cauterized.  recommend npo, hold iron, hold bisocodyl.  small bowel series and possible capsule if unclear.  issue is risk of nne (capsule retantion) with history of abdominal surgery.  of note wife ws not aware that patient had right hemicolectomy

## 2017-07-23 NOTE — PROGRESS NOTE ADULT - SUBJECTIVE AND OBJECTIVE BOX
KATHY ESCALERA:[id number],   72yMale followed for: gib  penicillin (Pruritus)    PAST MEDICAL & SURGICAL HISTORY:  Heartburn  Coronary artery disease involving native heart with angina pectoris, unspecified vessel or lesion type: Non-obstructive CAD.  Gastrostomy in place: surgical gastrostomy, NOT a PEG  Ischemic bowel disease: s/p exploratory laparotomy and creation of ileostomy 8/2013  Hypothyroid  Seizures: 8/2014 on keppra last EEG - no activity  CVA (cerebral infarction): with RUE weakness  Anoxic brain injury  Aortic aneurysm rupture: with residual Type B dissection  Aortic aneurysm, abdominal  Aortic aneurysm and dissection: type A with rupture 8/2013  Gout  BPH (Benign Prostatic Hyperplasia)  HTN - Hypertension  H/O ischemic bowel disease: s/p resection in 2013  History of tracheostomy: on 8/2013 with decannulation  Ascending aortic dissection: s/p repair on 8/19/2013  S/P ileostomy: exploratory laparotomy, creation of ileostomy and PEG on 8/24/13 due to ischemic bowel    FAMILY HISTORY:  Family history of stroke  Family history of diabetes mellitus type II (Mother)    MEDICATIONS  (STANDING):  finasteride 5 milliGRAM(s) Oral daily  allopurinol 100 milliGRAM(s) Oral two times a day after meals  levothyroxine 50 MICROGram(s) Oral daily  tamsulosin 0.4 milliGRAM(s) Oral at bedtime  pantoprazole Infusion 8 mG/Hr (10 mL/Hr) IV Continuous <Continuous>    MEDICATIONS  (PRN):      Vital Signs Last 24 Hrs  T(C): 36.4 (23 Jul 2017 03:52), Max: 37 (22 Jul 2017 22:04)  T(F): 97.5 (23 Jul 2017 03:52), Max: 98.6 (22 Jul 2017 22:04)  HR: 88 (23 Jul 2017 03:52) (83 - 98)  BP: 148/82 (23 Jul 2017 03:52) (118/75 - 160/88)  BP(mean): --  RR: 17 (23 Jul 2017 03:52) (17 - 18)  SpO2: 99% (23 Jul 2017 03:52) (97% - 99%)  nc/at  s1s2  cta  soft, nt, nd no guarding or rebound  no c/c/e    CBC Full  -  ( 22 Jul 2017 20:16 )  WBC Count : 7.6 K/uL  Hemoglobin : 8.9 g/dL  Hematocrit : 27.3 %  Platelet Count - Automated : 221 K/uL  Mean Cell Volume : 86.3 fl  Mean Cell Hemoglobin : 28.2 pg  Mean Cell Hemoglobin Concentration : 32.6 gm/dL  Auto Neutrophil # : x  Auto Lymphocyte # : x  Auto Monocyte # : x  Auto Eosinophil # : x  Auto Basophil # : x  Auto Neutrophil % : x  Auto Lymphocyte % : x  Auto Monocyte % : x  Auto Eosinophil % : x  Auto Basophil % : x

## 2017-07-23 NOTE — PROGRESS NOTE ADULT - ASSESSMENT
73 yo Male  as above:  1. Rectal bleeding - currently resolved, s/p EGD/colon, found to have duodenal angiodysplasia s/p coagulation, c/w PPI, GI f/u, trend CBC  2. Hx of aortic disection - no acute disection on CT  3. HTN - BP at goal  4. Hypothyroidism - c/w Synthroid  5. Gout - c/w Allopurinol  6. BPH - c/w Finasteride,  7. Mechanical DVT prophylaxis  discussed w/ pt family member at bedside  cont to observe

## 2017-07-23 NOTE — PROGRESS NOTE ADULT - SUBJECTIVE AND OBJECTIVE BOX
CHIEF COMPLAINT:Patient is a 72y old  Male who presents with a chief complaint of BLOODY STOOL X3 (20 Jul 2017 21:22)    	  black stools      PAST MEDICAL & SURGICAL HISTORY:  Heartburn  Coronary artery disease involving native heart with angina pectoris, unspecified vessel or lesion type: Non-obstructive CAD.  Gastrostomy in place: surgical gastrostomy, NOT a PEG  Ischemic bowel disease: s/p exploratory laparotomy and creation of ileostomy 8/2013  Hypothyroid  Seizures: 8/2014 on keppra last EEG - no activity  CVA (cerebral infarction): with RUE weakness  Anoxic brain injury  Aortic aneurysm rupture: with residual Type B dissection  Aortic aneurysm, abdominal  Aortic aneurysm and dissection: type A with rupture 8/2013  Gout  BPH (Benign Prostatic Hyperplasia)  HTN - Hypertension  H/O ischemic bowel disease: s/p resection in 2013  History of tracheostomy: on 8/2013 with decannulation  Ascending aortic dissection: s/p repair on 8/19/2013  S/P ileostomy: exploratory laparotomy, creation of ileostomy and PEG on 8/24/13 due to ischemic bowel          REVIEW OF SYSTEMS:  CONSTITUTIONAL: No fever, weight loss, or fatigue  EYES: No eye pain, visual disturbances, or discharge  NECK: No pain or stiffness  RESPIRATORY: No cough, wheezing, chills or hemoptysis; No Shortness of Breath  CARDIOVASCULAR: No chest pain, palpitations, passing out, dizziness, or leg swelling  GASTROINTESTINAL: No abdominal or epigastric pain. No nausea, vomiting, or hematemesis; No diarrhea or constipation. No melena or hematochezia.  GENITOURINARY: No dysuria, frequency, hematuria, or incontinence  NEUROLOGICAL: No headaches, memory loss, loss of strength, numbness, or tremors  SKIN: No itching, burning, rashes, or lesions   LYMPH Nodes: No enlarged glands  ENDOCRINE: No heat or cold intolerance; No hair loss  MUSCULOSKELETAL: No joint pain or swelling; No muscle, back, or extremity pain    Medications:  MEDICATIONS  (STANDING):  finasteride 5 milliGRAM(s) Oral daily  allopurinol 100 milliGRAM(s) Oral two times a day after meals  ferrous    sulfate 325 milliGRAM(s) Oral daily  pantoprazole    Tablet 40 milliGRAM(s) Oral before breakfast  levothyroxine 50 MICROGram(s) Oral daily  bisacodyl 10 milliGRAM(s) Oral at bedtime  tamsulosin 0.4 milliGRAM(s) Oral at bedtime    MEDICATIONS  (PRN):    	    PHYSICAL EXAM:  T(C): 36.4 (07-23-17 @ 03:52), Max: 37 (07-22-17 @ 22:04)  HR: 88 (07-23-17 @ 03:52) (83 - 98)  BP: 148/82 (07-23-17 @ 03:52) (118/75 - 160/88)  RR: 17 (07-23-17 @ 03:52) (17 - 18)  SpO2: 99% (07-23-17 @ 03:52) (97% - 99%)  Wt(kg): --  I&O's Summary    22 Jul 2017 07:01  -  23 Jul 2017 07:00  --------------------------------------------------------  IN: 2040 mL / OUT: 0 mL / NET: 2040 mL        Appearance: Normal	  HEENT:   Normal oral mucosa, PERRL, EOMI	  Lymphatic: No lymphadenopathy  Cardiovascular: Normal S1 S2, No JVD, No murmurs, No edema  Respiratory: Lungs clear to auscultation	  Psychiatry: A & O x 3, Mood & affect appropriate  Gastrointestinal:  Soft, Non-tender, + BS	  Skin: No rashes, No ecchymoses, No cyanosis	  Neurologic: Non-focal  Extremities: Normal range of motion, No clubbing, cyanosis or edema  Vascular: Peripheral pulses palpable 2+ bilaterally    TELEMETRY: 	    ECG:  	  RADIOLOGY:  OTHER: 	  	  LABS:	 	    CARDIAC MARKERS:                                8.9    7.6   )-----------( 221      ( 22 Jul 2017 20:16 )             27.3           proBNP:   Lipid Profile:   HgA1c:   TSH:

## 2017-07-24 LAB
ANION GAP SERPL CALC-SCNC: 9 MMOL/L — SIGNIFICANT CHANGE UP (ref 5–17)
BUN SERPL-MCNC: 27 MG/DL — HIGH (ref 7–23)
CALCIUM SERPL-MCNC: 8.4 MG/DL — SIGNIFICANT CHANGE UP (ref 8.4–10.5)
CHLORIDE SERPL-SCNC: 108 MMOL/L — SIGNIFICANT CHANGE UP (ref 96–108)
CO2 SERPL-SCNC: 25 MMOL/L — SIGNIFICANT CHANGE UP (ref 22–31)
CREAT SERPL-MCNC: 0.99 MG/DL — SIGNIFICANT CHANGE UP (ref 0.5–1.3)
GLUCOSE SERPL-MCNC: 90 MG/DL — SIGNIFICANT CHANGE UP (ref 70–99)
HCT VFR BLD CALC: 26.1 % — LOW (ref 39–50)
HGB BLD-MCNC: 8.7 G/DL — LOW (ref 13–17)
MCHC RBC-ENTMCNC: 28.5 PG — SIGNIFICANT CHANGE UP (ref 27–34)
MCHC RBC-ENTMCNC: 33.3 GM/DL — SIGNIFICANT CHANGE UP (ref 32–36)
MCV RBC AUTO: 85.6 FL — SIGNIFICANT CHANGE UP (ref 80–100)
PLATELET # BLD AUTO: 190 K/UL — SIGNIFICANT CHANGE UP (ref 150–400)
POTASSIUM SERPL-MCNC: 3.9 MMOL/L — SIGNIFICANT CHANGE UP (ref 3.5–5.3)
POTASSIUM SERPL-SCNC: 3.9 MMOL/L — SIGNIFICANT CHANGE UP (ref 3.5–5.3)
RBC # BLD: 3.05 M/UL — LOW (ref 4.2–5.8)
RBC # FLD: 16.3 % — HIGH (ref 10.3–14.5)
SODIUM SERPL-SCNC: 142 MMOL/L — SIGNIFICANT CHANGE UP (ref 135–145)
WBC # BLD: 6.28 K/UL — SIGNIFICANT CHANGE UP (ref 3.8–10.5)
WBC # FLD AUTO: 6.28 K/UL — SIGNIFICANT CHANGE UP (ref 3.8–10.5)

## 2017-07-24 PROCEDURE — 74250 X-RAY XM SM INT 1CNTRST STD: CPT | Mod: 26

## 2017-07-24 RX ORDER — TAMSULOSIN HYDROCHLORIDE 0.4 MG/1
1 CAPSULE ORAL
Qty: 0 | Refills: 0 | COMMUNITY

## 2017-07-24 RX ORDER — SODIUM CHLORIDE 9 MG/ML
1000 INJECTION INTRAMUSCULAR; INTRAVENOUS; SUBCUTANEOUS
Qty: 0 | Refills: 0 | Status: DISCONTINUED | OUTPATIENT
Start: 2017-07-24 | End: 2017-07-30

## 2017-07-24 RX ADMIN — Medication 50 MICROGRAM(S): at 05:17

## 2017-07-24 RX ADMIN — FINASTERIDE 5 MILLIGRAM(S): 5 TABLET, FILM COATED ORAL at 17:32

## 2017-07-24 RX ADMIN — PANTOPRAZOLE SODIUM 10 MG/HR: 20 TABLET, DELAYED RELEASE ORAL at 21:06

## 2017-07-24 RX ADMIN — SODIUM CHLORIDE 50 MILLILITER(S): 9 INJECTION INTRAMUSCULAR; INTRAVENOUS; SUBCUTANEOUS at 21:06

## 2017-07-24 RX ADMIN — Medication 100 MILLIGRAM(S): at 17:32

## 2017-07-24 RX ADMIN — Medication 100 MILLIGRAM(S): at 08:52

## 2017-07-24 RX ADMIN — TAMSULOSIN HYDROCHLORIDE 0.4 MILLIGRAM(S): 0.4 CAPSULE ORAL at 21:06

## 2017-07-24 RX ADMIN — SODIUM CHLORIDE 50 MILLILITER(S): 9 INJECTION INTRAMUSCULAR; INTRAVENOUS; SUBCUTANEOUS at 17:32

## 2017-07-24 NOTE — PROGRESS NOTE ADULT - SUBJECTIVE AND OBJECTIVE BOX
KATHY ESCALERA:[id number],   72yMale followed for: gib  penicillin (Pruritus)    PAST MEDICAL & SURGICAL HISTORY:  Heartburn  Coronary artery disease involving native heart with angina pectoris, unspecified vessel or lesion type: Non-obstructive CAD.  Gastrostomy in place: surgical gastrostomy, NOT a PEG  Ischemic bowel disease: s/p exploratory laparotomy and creation of ileostomy 8/2013  Hypothyroid  Seizures: 8/2014 on keppra last EEG - no activity  CVA (cerebral infarction): with RUE weakness  Anoxic brain injury  Aortic aneurysm rupture: with residual Type B dissection  Aortic aneurysm, abdominal  Aortic aneurysm and dissection: type A with rupture 8/2013  Gout  BPH (Benign Prostatic Hyperplasia)  HTN - Hypertension  H/O ischemic bowel disease: s/p resection in 2013  History of tracheostomy: on 8/2013 with decannulation  Ascending aortic dissection: s/p repair on 8/19/2013  S/P ileostomy: exploratory laparotomy, creation of ileostomy and PEG on 8/24/13 due to ischemic bowel    FAMILY HISTORY:  Family history of stroke  Family history of diabetes mellitus type II (Mother)    MEDICATIONS  (STANDING):  finasteride 5 milliGRAM(s) Oral daily  allopurinol 100 milliGRAM(s) Oral two times a day after meals  levothyroxine 50 MICROGram(s) Oral daily  tamsulosin 0.4 milliGRAM(s) Oral at bedtime  pantoprazole Infusion 8 mG/Hr (10 mL/Hr) IV Continuous <Continuous>    MEDICATIONS  (PRN):      Vital Signs Last 24 Hrs  T(C): 36.6 (24 Jul 2017 04:01), Max: 37.1 (23 Jul 2017 12:45)  T(F): 97.8 (24 Jul 2017 04:01), Max: 98.7 (23 Jul 2017 12:45)  HR: 86 (24 Jul 2017 04:01) (74 - 88)  BP: 113/75 (24 Jul 2017 04:01) (113/75 - 136/77)  BP(mean): --  RR: 18 (24 Jul 2017 04:01) (18 - 18)  SpO2: 98% (24 Jul 2017 04:01) (97% - 99%)  nc/at  s1s2  cta  soft, nt, nd no guarding or rebound  no c/c/e    CBC Full  -  ( 24 Jul 2017 07:50 )  WBC Count : 6.28 K/uL  Hemoglobin : 8.7 g/dL  Hematocrit : 26.1 %  Platelet Count - Automated : 190 K/uL  Mean Cell Volume : 85.6 fl  Mean Cell Hemoglobin : 28.5 pg  Mean Cell Hemoglobin Concentration : 33.3 gm/dL  Auto Neutrophil # : x  Auto Lymphocyte # : x  Auto Monocyte # : x  Auto Eosinophil # : x  Auto Basophil # : x  Auto Neutrophil % : x  Auto Lymphocyte % : x  Auto Monocyte % : x  Auto Eosinophil % : x  Auto Basophil % : x    07-24    142  |  108  |  27<H>  ----------------------------<  90  3.9   |  25  |  0.99    Ca    8.4      24 Jul 2017 07:53

## 2017-07-24 NOTE — PROGRESS NOTE ADULT - ASSESSMENT
71 yo Male  as above:  1. 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 Hx of aortic disection - no acute disection on CT  3. HTN - BP at goal  4. Hypothyroidism - c/w Synthroid  5. Gout - c/w Allopurinol  6. BPH - c/w Finasteride,  7. Mechanical DVT prophylaxis

## 2017-07-24 NOTE — PROGRESS NOTE ADULT - ASSESSMENT
patient had avm on endosocpy.  partial colectomy with small ulcers on colon.  patient with dark stool (no blood/ melena at time of procedures).  pt dropped hgb and got 2 units prbcs. made npo and ppi drip.  At this point the plan is ? bleeding from treated avm (risk of retreating increaesd)  versus, small bowel source.  Patient with Risk of NNE discussed with him and wife.  Need small bowel series and possible capsule.  continue ppi drip. If hgb drops again or continued "dark stool" (none now) would do second look endosopcy.

## 2017-07-24 NOTE — PROGRESS NOTE ADULT - SUBJECTIVE AND OBJECTIVE BOX
CHIEF COMPLAINT:Patient is a 72y old  Male who presents with a chief complaint of BLOODY STOOL X3 (20 Jul 2017 21:22)    	  Interval history: reports no more black stools      Allergies:  penicillin (Pruritus)      PAST MEDICAL & SURGICAL HISTORY:  Heartburn  Coronary artery disease involving native heart with angina pectoris, unspecified vessel or lesion type: Non-obstructive CAD.  Gastrostomy in place: surgical gastrostomy, NOT a PEG  Ischemic bowel disease: s/p exploratory laparotomy and creation of ileostomy 8/2013  Hypothyroid  Seizures: 8/2014 on keppra last EEG - no activity  CVA (cerebral infarction): with RUE weakness  Anoxic brain injury  Aortic aneurysm rupture: with residual Type B dissection  Aortic aneurysm, abdominal  Aortic aneurysm and dissection: type A with rupture 8/2013  Gout  BPH (Benign Prostatic Hyperplasia)  HTN - Hypertension  H/O ischemic bowel disease: s/p resection in 2013  History of tracheostomy: on 8/2013 with decannulation  Ascending aortic dissection: s/p repair on 8/19/2013  S/P ileostomy: exploratory laparotomy, creation of ileostomy and PEG on 8/24/13 due to ischemic bowel      FAMILY HISTORY:  Family history of stroke  Family history of diabetes mellitus type II (Mother)      REVIEW OF SYSTEMS:  CONSTITUTIONAL: No fever, weight loss, or fatigue  EYES: No eye pain, visual disturbances, or discharge  NECK: No pain or stiffness  RESPIRATORY: No cough or wheezing, no shortness of breath  CARDIOVASCULAR: No chest pain, palpitations, dizziness, or leg swelling  GASTROINTESTINAL: No abdominal or epigastric pain. No nausea, vomiting, diarrhea or constipation  GENITOURINARY: No dysuria, urinary frequency or urgency, no hematuria  NEUROLOGICAL: No headaches, memory loss, loss of strength, numbness, or tremors  SKIN: No itching, burning, rashes, or lesions   MUSCULOSKELETAL: No joint pain or swelling; No muscle, back, or extremity pain    Medications:  MEDICATIONS  (STANDING):  finasteride 5 milliGRAM(s) Oral daily  allopurinol 100 milliGRAM(s) Oral two times a day after meals  levothyroxine 50 MICROGram(s) Oral daily  tamsulosin 0.4 milliGRAM(s) Oral at bedtime  pantoprazole Infusion 8 mG/Hr (10 mL/Hr) IV Continuous <Continuous>    MEDICATIONS  (PRN):    	    PHYSICAL EXAM:  T(C): 36.6 (07-24-17 @ 04:01), Max: 36.9 (07-23-17 @ 17:08)  HR: 86 (07-24-17 @ 04:01) (79 - 88)  BP: 113/75 (07-24-17 @ 04:01) (113/75 - 136/77)  RR: 18 (07-24-17 @ 04:01) (18 - 18)  SpO2: 98% (07-24-17 @ 04:01) (97% - 99%)  Wt(kg): --  I&O's Summary    23 Jul 2017 07:01  -  24 Jul 2017 07:00  --------------------------------------------------------  IN: 920 mL / OUT: 0 mL / NET: 920 mL    24 Jul 2017 07:01  -  24 Jul 2017 16:16  --------------------------------------------------------  IN: 0 mL / OUT: 0 mL / NET: 0 mL        Appearance: Normal	  HEENT:   NCAT, PERRL, EOMI	  Lymphatic: No lymphadenopathy  Cardiovascular: Normal S1 S2, RRR  Respiratory: Lungs clear to auscultation BL  Psychiatry: A & O x 3, Mood & affect appropriate  Gastrointestinal:  Soft, Non-tender, + BS  Skin: No rashes, No ecchymoses, No cyanosis	  Neurologic: Non-focal  Extremities: Normal range of motion, No clubbing, cyanosis or edema    	  LABS:	 	    CARDIAC MARKERS:                                8.7    6.28  )-----------( 190      ( 24 Jul 2017 07:50 )             26.1     07-24    142  |  108  |  27<H>  ----------------------------<  90  3.9   |  25  |  0.99    Ca    8.4      24 Jul 2017 07:53      proBNP:   Lipid Profile:   HgA1c:   TSH:

## 2017-07-25 LAB
BLD GP AB SCN SERPL QL: NEGATIVE — SIGNIFICANT CHANGE UP
HCT VFR BLD CALC: 22.2 % — LOW (ref 39–50)
HCT VFR BLD CALC: 23.9 % — LOW (ref 39–50)
HGB BLD-MCNC: 7.4 G/DL — LOW (ref 13–17)
HGB BLD-MCNC: 8 G/DL — LOW (ref 13–17)
MCHC RBC-ENTMCNC: 28.6 PG — SIGNIFICANT CHANGE UP (ref 27–34)
MCHC RBC-ENTMCNC: 30 PG — SIGNIFICANT CHANGE UP (ref 27–34)
MCHC RBC-ENTMCNC: 33.3 GM/DL — SIGNIFICANT CHANGE UP (ref 32–36)
MCHC RBC-ENTMCNC: 33.6 GM/DL — SIGNIFICANT CHANGE UP (ref 32–36)
MCV RBC AUTO: 85.7 FL — SIGNIFICANT CHANGE UP (ref 80–100)
MCV RBC AUTO: 89.1 FL — SIGNIFICANT CHANGE UP (ref 80–100)
PLATELET # BLD AUTO: 190 K/UL — SIGNIFICANT CHANGE UP (ref 150–400)
PLATELET # BLD AUTO: 207 K/UL — SIGNIFICANT CHANGE UP (ref 150–400)
RBC # BLD: 2.59 M/UL — LOW (ref 4.2–5.8)
RBC # BLD: 2.68 M/UL — LOW (ref 4.2–5.8)
RBC # FLD: 15.6 % — HIGH (ref 10.3–14.5)
RBC # FLD: 16.7 % — HIGH (ref 10.3–14.5)
RH IG SCN BLD-IMP: POSITIVE — SIGNIFICANT CHANGE UP
WBC # BLD: 4.27 K/UL — SIGNIFICANT CHANGE UP (ref 3.8–10.5)
WBC # BLD: 4.4 K/UL — SIGNIFICANT CHANGE UP (ref 3.8–10.5)
WBC # FLD AUTO: 4.27 K/UL — SIGNIFICANT CHANGE UP (ref 3.8–10.5)
WBC # FLD AUTO: 4.4 K/UL — SIGNIFICANT CHANGE UP (ref 3.8–10.5)

## 2017-07-25 RX ADMIN — Medication 100 MILLIGRAM(S): at 17:32

## 2017-07-25 RX ADMIN — TAMSULOSIN HYDROCHLORIDE 0.4 MILLIGRAM(S): 0.4 CAPSULE ORAL at 23:08

## 2017-07-25 RX ADMIN — PANTOPRAZOLE SODIUM 10 MG/HR: 20 TABLET, DELAYED RELEASE ORAL at 19:12

## 2017-07-25 RX ADMIN — PANTOPRAZOLE SODIUM 10 MG/HR: 20 TABLET, DELAYED RELEASE ORAL at 08:50

## 2017-07-25 RX ADMIN — SODIUM CHLORIDE 50 MILLILITER(S): 9 INJECTION INTRAMUSCULAR; INTRAVENOUS; SUBCUTANEOUS at 08:50

## 2017-07-25 RX ADMIN — Medication 100 MILLIGRAM(S): at 08:49

## 2017-07-25 RX ADMIN — FINASTERIDE 5 MILLIGRAM(S): 5 TABLET, FILM COATED ORAL at 11:20

## 2017-07-25 RX ADMIN — Medication 50 MICROGRAM(S): at 05:43

## 2017-07-25 NOTE — PROGRESS NOTE ADULT - ASSESSMENT
GI bleed s/p cautery of bleeding duodenal AVM and ulcers ileal-colonic anastamosis. SB series negative. Plan is for capsule. clears today

## 2017-07-25 NOTE — PROGRESS NOTE ADULT - SUBJECTIVE AND OBJECTIVE BOX
INTERVAL HPI/OVERNIGHT EVENTS: no melena overnight. Hb stable. SB series negative for obstruction.     MEDICATIONS  (STANDING):  finasteride 5 milliGRAM(s) Oral daily  allopurinol 100 milliGRAM(s) Oral two times a day after meals  levothyroxine 50 MICROGram(s) Oral daily  tamsulosin 0.4 milliGRAM(s) Oral at bedtime  pantoprazole Infusion 8 mG/Hr (10 mL/Hr) IV Continuous <Continuous>  sodium chloride 0.9%. 1000 milliLiter(s) (50 mL/Hr) IV Continuous <Continuous>  Nuedexta (Dextromethorphan and Quinidine 1 Capsule(s)   Oral two times a day    MEDICATIONS  (PRN):      Allergies    penicillin (Pruritus)    Intolerances            PHYSICAL EXAM:   Vital Signs:  Vital Signs Last 24 Hrs  T(C): 36.6 (2017 03:59), Max: 36.6 (2017 03:59)  T(F): 97.9 (2017 03:59), Max: 97.9 (2017 03:59)  HR: 78 (2017 03:59) (78 - 83)  BP: 103/60 (2017 03:59) (103/60 - 104/66)  BP(mean): --  RR: 17 (2017 03:59) (17 - 17)  SpO2: 96% (2017 03:59) (96% - 97%)  Daily     Daily     GENERAL:  no distress  HEENT:  NC/AT,  anicteric  CHEST:   no increased effort, breath sounds clear  HEART:  Regular rhythm  ABDOMEN:  Soft, non-tender, non-distended, normoactive bowel sounds,  no masses ,no hepato-splenomegaly, no signs of chronic liver disease  EXTEREMITIES:  no cyanosis      LABS:                        8.7    6.28  )-----------( 190      ( 2017 07:50 )             26.1     07-    142  |  108  |  27<H>  ----------------------------<  90  3.9   |  25  |  0.99    Ca    8.4      2017 07:53        Urinalysis Basic - ( 2017 18:23 )    Color: x / Appearance: Clear / S.014 / pH: x  Gluc: x / Ketone: Negative  / Bili: Negative / Urobili: Negative   Blood: x / Protein: Negative / Nitrite: Negative   Leuk Esterase: Negative / RBC: x / WBC x   Sq Epi: x / Non Sq Epi: x / Bacteria: x        RADIOLOGY & ADDITIONAL TESTS:

## 2017-07-25 NOTE — PROGRESS NOTE ADULT - ASSESSMENT
71 yo Male  as above:  1. Rectal bleeding - currently resolved, s/p EGD/colon, found to have duodenal angiodysplasia s/p coagulation, c/w PPI drip, trend CBC q12, capsule tomorrow, transfused 1U PRBC for symptomatic anemia  2. Hx of aortic disection - no acute disection on CT  3. HTN - BP at goal  4. Hypothyroidism - c/w Synthroid  5. Gout - c/w Allopurinol  6. BPH - c/w Finasteride,  7. Mechanical DVT prophylaxis

## 2017-07-25 NOTE — PROGRESS NOTE ADULT - SUBJECTIVE AND OBJECTIVE BOX
CHIEF COMPLAINT:Patient is a 72y old  Male who presents with a chief complaint of BLOODY STOOL X3 (20 Jul 2017 21:22)    	  Interval history: pt feeling very weak and dizzy but no more black stools noted      Allergies:  penicillin (Pruritus)      PAST MEDICAL & SURGICAL HISTORY:  Heartburn  Coronary artery disease involving native heart with angina pectoris, unspecified vessel or lesion type: Non-obstructive CAD.  Gastrostomy in place: surgical gastrostomy, NOT a PEG  Ischemic bowel disease: s/p exploratory laparotomy and creation of ileostomy 8/2013  Hypothyroid  Seizures: 8/2014 on keppra last EEG - no activity  CVA (cerebral infarction): with RUE weakness  Anoxic brain injury  Aortic aneurysm rupture: with residual Type B dissection  Aortic aneurysm, abdominal  Aortic aneurysm and dissection: type A with rupture 8/2013  Gout  BPH (Benign Prostatic Hyperplasia)  HTN - Hypertension  H/O ischemic bowel disease: s/p resection in 2013  History of tracheostomy: on 8/2013 with decannulation  Ascending aortic dissection: s/p repair on 8/19/2013  S/P ileostomy: exploratory laparotomy, creation of ileostomy and PEG on 8/24/13 due to ischemic bowel      FAMILY HISTORY:  Family history of stroke  Family history of diabetes mellitus type II (Mother)      REVIEW OF SYSTEMS:  CONSTITUTIONAL: No fever, weight loss, or fatigue  EYES: No eye pain, visual disturbances, or discharge  NECK: No pain or stiffness  RESPIRATORY: No cough or wheezing, no shortness of breath  CARDIOVASCULAR: No chest pain, palpitations, dizziness, or leg swelling  GASTROINTESTINAL: No abdominal or epigastric pain. No nausea, vomiting, diarrhea or constipation  GENITOURINARY: No dysuria, urinary frequency or urgency, no hematuria  NEUROLOGICAL: No headaches, memory loss, loss of strength, numbness, or tremors  SKIN: No itching, burning, rashes, or lesions   MUSCULOSKELETAL: No joint pain or swelling; No muscle, back, or extremity pain      Medications:  MEDICATIONS  (STANDING):  finasteride 5 milliGRAM(s) Oral daily  allopurinol 100 milliGRAM(s) Oral two times a day after meals  levothyroxine 50 MICROGram(s) Oral daily  tamsulosin 0.4 milliGRAM(s) Oral at bedtime  pantoprazole Infusion 8 mG/Hr (10 mL/Hr) IV Continuous <Continuous>  sodium chloride 0.9%. 1000 milliLiter(s) (50 mL/Hr) IV Continuous <Continuous>  Nuedexta (Dextromethorphan and Quinidine 1 Capsule(s) 1 Capsule(s) Oral two times a day    MEDICATIONS  (PRN):    	    PHYSICAL EXAM:  T(C): 36.8 (07-25-17 @ 21:55), Max: 36.8 (07-25-17 @ 12:16)  HR: 80 (07-25-17 @ 21:55) (73 - 83)  BP: 137/76 (07-25-17 @ 21:55) (103/60 - 137/76)  RR: 18 (07-25-17 @ 21:55) (17 - 18)  SpO2: 100% (07-25-17 @ 21:55) (96% - 100%)  Wt(kg): --  I&O's Summary    24 Jul 2017 07:01  -  25 Jul 2017 07:00  --------------------------------------------------------  IN: 720 mL / OUT: 100 mL / NET: 620 mL    25 Jul 2017 07:01  -  25 Jul 2017 22:11  --------------------------------------------------------  IN: 0 mL / OUT: 0 mL / NET: 0 mL        Appearance: Normal	  HEENT:   NCAT, PERRL, EOMI	  Lymphatic: No lymphadenopathy  Cardiovascular: Normal S1 S2, RRR  Respiratory: Lungs clear to auscultation BL  Psychiatry: A & O x 3, Mood & affect appropriate  Gastrointestinal:  Soft, Non-tender, + BS  Skin: No rashes, No ecchymoses, No cyanosis	  Neurologic: Non-focal  Extremities: Normal range of motion, No clubbing, cyanosis or edema    	  LABS:	 	    CARDIAC MARKERS:                                8.0    4.4   )-----------( 207      ( 25 Jul 2017 11:58 )             23.9     07-24    142  |  108  |  27<H>  ----------------------------<  90  3.9   |  25  |  0.99    Ca    8.4      24 Jul 2017 07:53      proBNP:   Lipid Profile:   HgA1c:   TSH:

## 2017-07-26 LAB
ANION GAP SERPL CALC-SCNC: 10 MMOL/L — SIGNIFICANT CHANGE UP (ref 5–17)
BUN SERPL-MCNC: 10 MG/DL — SIGNIFICANT CHANGE UP (ref 7–23)
CALCIUM SERPL-MCNC: 8.2 MG/DL — LOW (ref 8.4–10.5)
CHLORIDE SERPL-SCNC: 107 MMOL/L — SIGNIFICANT CHANGE UP (ref 96–108)
CO2 SERPL-SCNC: 24 MMOL/L — SIGNIFICANT CHANGE UP (ref 22–31)
CREAT SERPL-MCNC: 1.1 MG/DL — SIGNIFICANT CHANGE UP (ref 0.5–1.3)
GLUCOSE SERPL-MCNC: 101 MG/DL — HIGH (ref 70–99)
HCT VFR BLD CALC: 27 % — LOW (ref 39–50)
HGB BLD-MCNC: 9.3 G/DL — LOW (ref 13–17)
MCHC RBC-ENTMCNC: 30.4 PG — SIGNIFICANT CHANGE UP (ref 27–34)
MCHC RBC-ENTMCNC: 34.4 GM/DL — SIGNIFICANT CHANGE UP (ref 32–36)
MCV RBC AUTO: 88.5 FL — SIGNIFICANT CHANGE UP (ref 80–100)
PLATELET # BLD AUTO: 195 K/UL — SIGNIFICANT CHANGE UP (ref 150–400)
POTASSIUM SERPL-MCNC: 4 MMOL/L — SIGNIFICANT CHANGE UP (ref 3.5–5.3)
POTASSIUM SERPL-SCNC: 4 MMOL/L — SIGNIFICANT CHANGE UP (ref 3.5–5.3)
RBC # BLD: 3.05 M/UL — LOW (ref 4.2–5.8)
RBC # FLD: 15.2 % — HIGH (ref 10.3–14.5)
SODIUM SERPL-SCNC: 141 MMOL/L — SIGNIFICANT CHANGE UP (ref 135–145)
SURGICAL PATHOLOGY STUDY: SIGNIFICANT CHANGE UP
WBC # BLD: 5.2 K/UL — SIGNIFICANT CHANGE UP (ref 3.8–10.5)
WBC # FLD AUTO: 5.2 K/UL — SIGNIFICANT CHANGE UP (ref 3.8–10.5)

## 2017-07-26 RX ADMIN — FINASTERIDE 5 MILLIGRAM(S): 5 TABLET, FILM COATED ORAL at 17:07

## 2017-07-26 RX ADMIN — Medication 100 MILLIGRAM(S): at 17:36

## 2017-07-26 RX ADMIN — TAMSULOSIN HYDROCHLORIDE 0.4 MILLIGRAM(S): 0.4 CAPSULE ORAL at 21:40

## 2017-07-26 RX ADMIN — Medication 50 MICROGRAM(S): at 05:53

## 2017-07-26 RX ADMIN — Medication 100 MILLIGRAM(S): at 08:54

## 2017-07-26 NOTE — DIETITIAN INITIAL EVALUATION ADULT. - ORAL INTAKE PTA
Pt usually not a big eater, but wife reminds pt to not skip any meals to avoid wt loss. Usual breakfast- cheese, ham, eggs, or oatmeal; Lunch and dinner - chicken or fish, soft well cooked vegetables or starchy vegetables; Pt snacks on bagel with cream cheese or fruits; Pt drinks water and coffee with milk/good

## 2017-07-26 NOTE — DIETITIAN INITIAL EVALUATION ADULT. - ADHERENCE
Pt follows gout diet and avoids red meats and eating small amounts of cheese. Pt also follow low fiber diet and avoids 'roughage' unless the vegetables are cooked soft/good

## 2017-07-26 NOTE — DIETITIAN INITIAL EVALUATION ADULT. - PERTINENT LABORATORY DATA
Na 141 [135 - 145], K+ 4.0 [3.5 - 5.3], BUN 10 [7 - 23], Cr 1.10 [0.50 - 1.30],  [70 - 99], Phos --, Alk Phos --, AST --, ALT --, Mg --, Ca 8.2 [8.4 - 10.5], HbA1c --

## 2017-07-26 NOTE — DIETITIAN INITIAL EVALUATION ADULT. - NS AS NUTRI INTERV MEALS SNACK
Recommend to advance diet to full liquid and then to low fiber diet when medically feasible and as tolerated by pt

## 2017-07-26 NOTE — DIETITIAN INITIAL EVALUATION ADULT. - ENERGY NEEDS
Height:68 inches, Weight: 172 pounds  BMI: 26 kg/m2 IBW: 154 pounds (+/-10%), %IBW: 112%  Pertinent Info: Per chart, 71 y/o male admitted with lower GI bleed, s/p capsule study today. No edema, no pressure ulcers noted at this time.

## 2017-07-26 NOTE — DIETITIAN INITIAL EVALUATION ADULT. - OTHER INFO
Pt seen for LOS - per previous RD note, pt had some severe wt loss 2/2 GI issues many years ago. Pt had UBW of 165 but had dosing wt of 180.7 pounds (possibly scale error). Current dosing wt is fairly stable at 172 pounds- pt and wife tries hard to avoid wt loss. Pt with fair appetite, drinking clear liquid diet. Pt with chronic loose BMs since resection of ischemic bowel, admitted with bloody loose BMs. Per chart, pt initiated on capsule study today and to remain on clear liquid diet. No GI distress reported at this time, last BM yesterday. Pt denies chewing/swallowing difficulties. NKFA - per previous RD note, pt avoids dairy, but pt reports he can tolerate small amounts of milk and no restrictions on other dairy products. PTA takes vitamin D, E, eye vitamins, iron and omega 3 supplements.

## 2017-07-26 NOTE — PROGRESS NOTE ADULT - ASSESSMENT
71 yo Male  as above:  1. Rectal bleeding - no more melena/BRBPR, s/p AVM treatment, now getting capsule study, f/u GI, trend CBC  2. Hx of aortic disection - no acute disection on CT  3. HTN - BP at goal  4. Hypothyroidism - c/w Synthroid  5. Gout - c/w Allopurinol  6. BPH - c/w Finasteride,  7. Mechanical DVT prophylaxis

## 2017-07-26 NOTE — PROGRESS NOTE ADULT - ASSESSMENT
avm treated. no further melena, got another 1 u prbc's.  small bowel series is negative, recommend c ontinue ppi, will do capusle today, risk of nne and surgery was discussed with patient and wife.

## 2017-07-26 NOTE — PROGRESS NOTE ADULT - SUBJECTIVE AND OBJECTIVE BOX
CHIEF COMPLAINT:Patient is a 72y old  Male who presents with a chief complaint of BLOODY STOOL X3 (20 Jul 2017 21:22)    	  Interval history: feeling much better s/p PRBC      Allergies:  penicillin (Pruritus)      PAST MEDICAL & SURGICAL HISTORY:  Heartburn  Coronary artery disease involving native heart with angina pectoris, unspecified vessel or lesion type: Non-obstructive CAD.  Gastrostomy in place: surgical gastrostomy, NOT a PEG  Ischemic bowel disease: s/p exploratory laparotomy and creation of ileostomy 8/2013  Hypothyroid  Seizures: 8/2014 on keppra last EEG - no activity  CVA (cerebral infarction): with RUE weakness  Anoxic brain injury  Aortic aneurysm rupture: with residual Type B dissection  Aortic aneurysm, abdominal  Aortic aneurysm and dissection: type A with rupture 8/2013  Gout  BPH (Benign Prostatic Hyperplasia)  HTN - Hypertension  H/O ischemic bowel disease: s/p resection in 2013  History of tracheostomy: on 8/2013 with decannulation  Ascending aortic dissection: s/p repair on 8/19/2013  S/P ileostomy: exploratory laparotomy, creation of ileostomy and PEG on 8/24/13 due to ischemic bowel      FAMILY HISTORY:  Family history of stroke  Family history of diabetes mellitus type II (Mother)      REVIEW OF SYSTEMS:  CONSTITUTIONAL: No fever, weight loss, or fatigue  EYES: No eye pain, visual disturbances, or discharge  NECK: No pain or stiffness  RESPIRATORY: No cough or wheezing, no shortness of breath  CARDIOVASCULAR: No chest pain, palpitations, dizziness, or leg swelling  GASTROINTESTINAL: No abdominal or epigastric pain. No nausea, vomiting, diarrhea or constipation  GENITOURINARY: No dysuria, urinary frequency or urgency, no hematuria  NEUROLOGICAL: No headaches, memory loss, loss of strength, numbness, or tremors  SKIN: No itching, burning, rashes, or lesions   MUSCULOSKELETAL: No joint pain or swelling; No muscle, back, or extremity pain        Medications:  MEDICATIONS  (STANDING):  finasteride 5 milliGRAM(s) Oral daily  allopurinol 100 milliGRAM(s) Oral two times a day after meals  levothyroxine 50 MICROGram(s) Oral daily  tamsulosin 0.4 milliGRAM(s) Oral at bedtime  pantoprazole Infusion 8 mG/Hr (10 mL/Hr) IV Continuous <Continuous>  sodium chloride 0.9%. 1000 milliLiter(s) (50 mL/Hr) IV Continuous <Continuous>  Nuedexta (Dextromethorphan and Quinidine 1 Capsule(s) 1 Capsule(s) Oral two times a day    MEDICATIONS  (PRN):    	    PHYSICAL EXAM:  T(C): 36.9 (07-26-17 @ 12:23), Max: 36.9 (07-26-17 @ 01:30)  HR: 69 (07-26-17 @ 12:23) (69 - 80)  BP: 122/81 (07-26-17 @ 12:23) (121/70 - 137/76)  RR: 18 (07-26-17 @ 12:23) (18 - 18)  SpO2: 98% (07-26-17 @ 12:23) (95% - 100%)  Wt(kg): --  I&O's Summary    25 Jul 2017 07:01  -  26 Jul 2017 07:00  --------------------------------------------------------  IN: 660 mL / OUT: 0 mL / NET: 660 mL    26 Jul 2017 07:01  -  26 Jul 2017 15:23  --------------------------------------------------------  IN: 600 mL / OUT: 0 mL / NET: 600 mL        Appearance: Normal	  HEENT:   NCAT, PERRL, EOMI	  Lymphatic: No lymphadenopathy  Cardiovascular: Normal S1 S2, RRR  Respiratory: Lungs clear to auscultation BL  Psychiatry: A & O x 3, Mood & affect appropriate  Gastrointestinal:  Soft, Non-tender, + BS  Skin: No rashes, No ecchymoses, No cyanosis	  Neurologic: Non-focal  Extremities: Normal range of motion, No clubbing, cyanosis or edema    	  LABS:	 	    CARDIAC MARKERS:                                9.3    5.2   )-----------( 195      ( 26 Jul 2017 03:41 )             27.0     07-26    141  |  107  |  10  ----------------------------<  101<H>  4.0   |  24  |  1.10    Ca    8.2<L>      26 Jul 2017 03:41      proBNP:   Lipid Profile:   HgA1c:   TSH:

## 2017-07-26 NOTE — CHART NOTE - NSCHARTNOTEFT_GEN_A_CORE
Called by RN, family reports no light on the pill-cam recorder. S/p capsule endoscopy. Arrived at bedside, no acute distress, concerns discussed with patient and family. Discussed concerns with Dr. riggs, pill- cam recording complete, to be removed by GI.  Lakisha Roblero, ANP-C  [phone]

## 2017-07-26 NOTE — PROGRESS NOTE ADULT - SUBJECTIVE AND OBJECTIVE BOX
KATHY ESCALERA:[id number],   72yMale followed for: gib  penicillin (Pruritus)    PAST MEDICAL & SURGICAL HISTORY:  Heartburn  Coronary artery disease involving native heart with angina pectoris, unspecified vessel or lesion type: Non-obstructive CAD.  Gastrostomy in place: surgical gastrostomy, NOT a PEG  Ischemic bowel disease: s/p exploratory laparotomy and creation of ileostomy 8/2013  Hypothyroid  Seizures: 8/2014 on keppra last EEG - no activity  CVA (cerebral infarction): with RUE weakness  Anoxic brain injury  Aortic aneurysm rupture: with residual Type B dissection  Aortic aneurysm, abdominal  Aortic aneurysm and dissection: type A with rupture 8/2013  Gout  BPH (Benign Prostatic Hyperplasia)  HTN - Hypertension  H/O ischemic bowel disease: s/p resection in 2013  History of tracheostomy: on 8/2013 with decannulation  Ascending aortic dissection: s/p repair on 8/19/2013  S/P ileostomy: exploratory laparotomy, creation of ileostomy and PEG on 8/24/13 due to ischemic bowel    FAMILY HISTORY:  Family history of stroke  Family history of diabetes mellitus type II (Mother)    MEDICATIONS  (STANDING):  finasteride 5 milliGRAM(s) Oral daily  allopurinol 100 milliGRAM(s) Oral two times a day after meals  levothyroxine 50 MICROGram(s) Oral daily  tamsulosin 0.4 milliGRAM(s) Oral at bedtime  pantoprazole Infusion 8 mG/Hr (10 mL/Hr) IV Continuous <Continuous>  sodium chloride 0.9%. 1000 milliLiter(s) (50 mL/Hr) IV Continuous <Continuous>  Nuedexta (Dextromethorphan and Quinidine 1 Capsule(s) 1 Capsule(s) Oral two times a day    MEDICATIONS  (PRN):      Vital Signs Last 24 Hrs  T(C): 36.6 (26 Jul 2017 04:21), Max: 36.9 (26 Jul 2017 01:30)  T(F): 97.9 (26 Jul 2017 04:21), Max: 98.4 (26 Jul 2017 01:30)  HR: 78 (26 Jul 2017 04:21) (71 - 80)  BP: 121/70 (26 Jul 2017 04:21) (114/73 - 137/76)  BP(mean): --  RR: 18 (26 Jul 2017 04:21) (18 - 18)  SpO2: 95% (26 Jul 2017 04:21) (95% - 100%)  nc/at  s1s2  cta  soft, nt, nd no guarding or rebound  no c/c/e    CBC Full  -  ( 26 Jul 2017 03:41 )  WBC Count : 5.2 K/uL  Hemoglobin : 9.3 g/dL  Hematocrit : 27.0 %  Platelet Count - Automated : 195 K/uL  Mean Cell Volume : 88.5 fl  Mean Cell Hemoglobin : 30.4 pg  Mean Cell Hemoglobin Concentration : 34.4 gm/dL  Auto Neutrophil # : x  Auto Lymphocyte # : x  Auto Monocyte # : x  Auto Eosinophil # : x  Auto Basophil # : x  Auto Neutrophil % : x  Auto Lymphocyte % : x  Auto Monocyte % : x  Auto Eosinophil % : x  Auto Basophil % : x    07-26    141  |  107  |  10  ----------------------------<  101<H>  4.0   |  24  |  1.10    Ca    8.2<L>      26 Jul 2017 03:41

## 2017-07-27 LAB
HCT VFR BLD CALC: 25.6 % — LOW (ref 39–50)
HGB BLD-MCNC: 8.4 G/DL — LOW (ref 13–17)
MCHC RBC-ENTMCNC: 28.7 PG — SIGNIFICANT CHANGE UP (ref 27–34)
MCHC RBC-ENTMCNC: 32.8 GM/DL — SIGNIFICANT CHANGE UP (ref 32–36)
MCV RBC AUTO: 87.4 FL — SIGNIFICANT CHANGE UP (ref 80–100)
PLATELET # BLD AUTO: 214 K/UL — SIGNIFICANT CHANGE UP (ref 150–400)
RBC # BLD: 2.93 M/UL — LOW (ref 4.2–5.8)
RBC # FLD: 17.2 % — HIGH (ref 10.3–14.5)
WBC # BLD: 3.49 K/UL — LOW (ref 3.8–10.5)
WBC # FLD AUTO: 3.49 K/UL — LOW (ref 3.8–10.5)

## 2017-07-27 RX ADMIN — Medication 100 MILLIGRAM(S): at 17:27

## 2017-07-27 RX ADMIN — Medication 50 MICROGRAM(S): at 05:52

## 2017-07-27 RX ADMIN — Medication 100 MILLIGRAM(S): at 09:20

## 2017-07-27 RX ADMIN — PANTOPRAZOLE SODIUM 10 MG/HR: 20 TABLET, DELAYED RELEASE ORAL at 05:53

## 2017-07-27 RX ADMIN — PANTOPRAZOLE SODIUM 10 MG/HR: 20 TABLET, DELAYED RELEASE ORAL at 21:55

## 2017-07-27 RX ADMIN — FINASTERIDE 5 MILLIGRAM(S): 5 TABLET, FILM COATED ORAL at 13:44

## 2017-07-27 RX ADMIN — PANTOPRAZOLE SODIUM 10 MG/HR: 20 TABLET, DELAYED RELEASE ORAL at 09:20

## 2017-07-27 RX ADMIN — TAMSULOSIN HYDROCHLORIDE 0.4 MILLIGRAM(S): 0.4 CAPSULE ORAL at 21:55

## 2017-07-27 NOTE — CONSULT NOTE ADULT - SUBJECTIVE AND OBJECTIVE BOX
Chief Complaint: admitted with GI bleed, weakness    HPI:  72 year old male with PMH of aortic dissection complicated by ischemic bowel s/p resection, tracheostomy s/p reversal, ileostomy s/p reversal, right arm paralysis, HTN, gout, seizures, BPH, and hypothyroidism; presents after lower GI bleeding with three episodes prior to admission on 7-20-17. He had 3 episodes of dark red/brown liquid stool, so his wife brought him to the ED. He also reports some fecal urgency, chronic diarrhea, and heartburn. He also had some lower abdominal pain.     He denies fever, SOB, HA, N/V, or dysuria. He doesn't eat much usually, but that hasn't changed recently. His wife says he hasn't had a seizure in a long time. About a month ago (6/2017) he has atypical CP and had a cardiac cath which showed an EF of 55% and non-obstructive CAD. Patient given pantoprazole IV, viscous lidocaine, and maalox in the ED. S/p 1L bolus of NS in the ED. (20 Jul 2017 20:05).  Since admission he underwent EGD and colonoscopy that showed duodenal ulcer and ulcer at the ileal-colonic anastamosis. he had 2units pRBC transfusion initially and then a 3rd unit a few days later. He now underwent a capsule study, but has not been reviewed yet.      He feels weak and dizzy today, denies pain, SOB, cough, F/C, N/V/D/C. He has not had any more bleeding. He has had dark stools in the past on occasion, but not bloody like prior to admission. His PMH includes aortic dissection with emergency surgical repair followed by "coma" for a long time with slow recovery over one year. He i snow fairly active at home.    PAST MEDICAL & SURGICAL HISTORY:  Heartburn  Coronary artery disease involving native heart with angina pectoris, unspecified vessel or lesion type: Non-obstructive CAD.  Gastrostomy in place: surgical gastrostomy, NOT a PEG  Ischemic bowel disease: s/p exploratory laparotomy and creation of ileostomy 8/2013  Hypothyroid  Seizures: 8/2014 on keppra last EEG - no activity  CVA (cerebral infarction): with RUE weakness  Anoxic brain injury  Aortic aneurysm rupture: with residual Type B dissection  Aortic aneurysm, abdominal  Aortic aneurysm and dissection: type A with rupture 8/2013  Gout  BPH (Benign Prostatic Hyperplasia)  HTN - Hypertension  H/O ischemic bowel disease: s/p resection in 2013  History of tracheostomy: on 8/2013 with decannulation  Ascending aortic dissection: s/p repair on 8/19/2013  S/P ileostomy: exploratory laparotomy, creation of ileostomy and PEG on 8/24/13 due to ischemic bowel      SOCIAL HISTORY:  Smoking - Non smoker   Alcohol - Social  Drugs - No drug use    FAMILY HISTORY:  Family history of stroke  Family history of diabetes mellitus type II (Mother)      MEDICATIONS  (STANDING):  finasteride 5 milliGRAM(s) Oral daily  allopurinol 100 milliGRAM(s) Oral two times a day after meals  levothyroxine 50 MICROGram(s) Oral daily  tamsulosin 0.4 milliGRAM(s) Oral at bedtime  pantoprazole Infusion 8 mG/Hr (10 mL/Hr) IV Continuous <Continuous>  sodium chloride 0.9%. 1000 milliLiter(s) (50 mL/Hr) IV Continuous <Continuous>  Nuedexta (Dextromethorphan and Quinidine 1 Capsule(s) 1 Capsule(s) Oral two times a day    MEDICATIONS  (PRN):      Allergies    penicillin (Pruritus)    Intolerances        Vital Signs Last 24 Hrs  T(C): 36.8 (27 Jul 2017 12:05), Max: 36.8 (27 Jul 2017 12:05)  T(F): 98.2 (27 Jul 2017 12:05), Max: 98.2 (27 Jul 2017 12:05)  HR: 79 (27 Jul 2017 12:05) (79 - 80)  BP: 108/66 (27 Jul 2017 12:05) (108/66 - 136/80)  BP(mean): --  RR: 18 (27 Jul 2017 12:05) (18 - 18)  SpO2: 96% (27 Jul 2017 12:05) (95% - 96%)    PHYSICAL EXAM  General: NAD, sitting in chair, AA, comfortable, thin  HEENT: clear oropharynx, anicteric sclera, pink conjunctiva  Neck: supple, tracheal stoma s/p closure  CV: normal S1/S2 with no murmur rubs or gallops  Lungs: clear to auscultation, no wheezes, no rales  Abdomen: soft non-tender non-distended, no hepatosplenomegaly, positive bowel sounds, ventral hernias  Ext: no clubbing cyanosis or edema  Skin: no rashes and no petechiae  Lymph Nodes: No LAD in axillae, groin, neck  Neuro: alert and oriented X 3, no focal deficits    LABS:                          8.4    3.49  )-----------( 214      ( 27 Jul 2017 07:16 )             25.6         Mean Cell Volume : 87.4 fl  Mean Cell Hemoglobin : 28.7 pg  Mean Cell Hemoglobin Concentration : 32.8 gm/dL  Auto Neutrophil # : x  Auto Lymphocyte # : x  Auto Monocyte # : x  Auto Eosinophil # : x  Auto Basophil # : x  Auto Neutrophil % : x  Auto Lymphocyte % : x  Auto Monocyte % : x  Auto Eosinophil % : x  Auto Basophil % : x      Serial CBC's  07-27 @ 07:16  Hct-25.6 / Hgb-8.4 / Plat-214 / RBC-2.93 / WBC-3.49  Serial CBC's  07-26 @ 03:41  Hct-27.0 / Hgb-9.3 / Plat-195 / RBC-3.05 / WBC-5.2  Serial CBC's  07-25 @ 11:58  Hct-23.9 / Hgb-8.0 / Plat-207 / RBC-2.68 / WBC-4.4  Serial CBC's  07-25 @ 08:58  Hct-22.2 / Hgb-7.4 / Plat-190 / RBC-2.59 / WBC-4.27  Serial CBC's  07-24 @ 07:50  Hct-26.1 / Hgb-8.7 / Plat-190 / RBC-3.05 / WBC-6.28  Serial CBC's  07-23 @ 22:13  Hct-25.8 / Hgb-8.9 / Plat-175 / RBC-2.97 / WBC-6.9      07-26    141  |  107  |  10  ----------------------------<  101<H>  4.0   |  24  |  1.10    Ca    8.2<L>      26 Jul 2017 03:41          < from: CT Angio Abdomen and Pelvis w/ IV Cont (07.20.17 @ 17:09) >  IMPRESSION: No GI bleed identified. Aortic dissection again noted, with   aneurysmal dilatation appearing stable since 5/20/2016.    < end of copied text >    < from: Xray Small Bowel Series (07.24.17 @ 14:58) >  No identifiable source of GI bleed. No evidence of bowel obstruction.    < end of copied text >    < from: Colonoscopy (07.21.17 @ 16:01) >  Findings:       ileocolic anastamosis, small ulcers bx                                                                                                        Impression:     - No specimens collected.  Recommendation:      - Await pathology results.    < end of copied text >    < from: Upper Endoscopy (07.21.17 @ 16:12) >  Impression:          - Normal esophagus.                       - Chronic gastritis.             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 Treated with argon                        plasma coagulation (APC).                       - No specimens collected.    < end of copied text >    Surgical Pathology Report (07.21.17 @ 16:30)    Surgical Pathology Report:   ACCESSION No:  [id number]    KATHY ESCALERA                       1        Surgical Final Report          Final Diagnosis  1     Duodenum biopsy:  - Fibroadipose tissue and blood vessels with amyloidosis  ([place] red and Trichrome stains), see note.  - No duodenal mucosa is present.    Note: The amorphous material is positive for IgG and  immunoglobulin light chains Kappa and lambda, and negative for  IgM. Clinical correlation and laboratory studies is recommended  to explore the underlying etiologies.    2     Ileal ulcer biopsy:  - Active enteritis with erosion, favor an infectious or  ischemic etiology.  - No evidence of amyloidosis.    Toni Whitfield M.D.  (Electronic Signature)  Reported on: 07/26/17    Clinical History  GIB    Specimen(s) Submitted  1     Duodenum bx  2     Ileal ulcer bx    Gross Description  1. The specimen is received in formalin and the specimen  container is labeled: Duodenum biopsy.  It consists of a 0.2 cm  in greatest dimension fragment of soft, tan-pink tissue.  Entirely submitted.  One cassette.    2. The specimen is received in formalin and the specimen  container is labeled: Ileal ulcer biopsy.  It consists of a 0.3  cm in greatest dimension fragment of soft, tan-pink tissue.  Entirely submitted.  One cassette.    In addition to other data that may appear on the specimen  containers, all labels have been inspected to confirm the  presence of the patient's name and date of birth.   07/21/17 20:28

## 2017-07-27 NOTE — PROGRESS NOTE ADULT - SUBJECTIVE AND OBJECTIVE BOX
KATHY ESCALERA:[id number],   72yMale followed for:  penicillin (Pruritus)    PAST MEDICAL & SURGICAL HISTORY:  Heartburn  Coronary artery disease involving native heart with angina pectoris, unspecified vessel or lesion type: Non-obstructive CAD.  Gastrostomy in place: surgical gastrostomy, NOT a PEG  Ischemic bowel disease: s/p exploratory laparotomy and creation of ileostomy 8/2013  Hypothyroid  Seizures: 8/2014 on keppra last EEG - no activity  CVA (cerebral infarction): with RUE weakness  Anoxic brain injury  Aortic aneurysm rupture: with residual Type B dissection  Aortic aneurysm, abdominal  Aortic aneurysm and dissection: type A with rupture 8/2013  Gout  BPH (Benign Prostatic Hyperplasia)  HTN - Hypertension  H/O ischemic bowel disease: s/p resection in 2013  History of tracheostomy: on 8/2013 with decannulation  Ascending aortic dissection: s/p repair on 8/19/2013  S/P ileostomy: exploratory laparotomy, creation of ileostomy and PEG on 8/24/13 due to ischemic bowel    FAMILY HISTORY:  Family history of stroke  Family history of diabetes mellitus type II (Mother)    MEDICATIONS  (STANDING):  finasteride 5 milliGRAM(s) Oral daily  allopurinol 100 milliGRAM(s) Oral two times a day after meals  levothyroxine 50 MICROGram(s) Oral daily  tamsulosin 0.4 milliGRAM(s) Oral at bedtime  pantoprazole Infusion 8 mG/Hr (10 mL/Hr) IV Continuous <Continuous>  sodium chloride 0.9%. 1000 milliLiter(s) (50 mL/Hr) IV Continuous <Continuous>  Nuedexta (Dextromethorphan and Quinidine 1 Capsule(s) 1 Capsule(s) Oral two times a day    MEDICATIONS  (PRN):      Vital Signs Last 24 Hrs  T(C): 36.6 (27 Jul 2017 03:57), Max: 36.9 (26 Jul 2017 12:23)  T(F): 97.8 (27 Jul 2017 03:57), Max: 98.4 (26 Jul 2017 12:23)  HR: 79 (27 Jul 2017 03:57) (69 - 80)  BP: 136/80 (27 Jul 2017 03:57) (120/78 - 136/80)  BP(mean): --  RR: 18 (27 Jul 2017 03:57) (18 - 18)  SpO2: 96% (27 Jul 2017 03:57) (95% - 98%)  nc/at  s1s2  cta  soft, nt, nd no guarding or rebound  no c/c/e    CBC Full  -  ( 27 Jul 2017 07:16 )  WBC Count : 3.49 K/uL  Hemoglobin : 8.4 g/dL  Hematocrit : 25.6 %  Platelet Count - Automated : 214 K/uL  Mean Cell Volume : 87.4 fl  Mean Cell Hemoglobin : 28.7 pg  Mean Cell Hemoglobin Concentration : 32.8 gm/dL  Auto Neutrophil # : x  Auto Lymphocyte # : x  Auto Monocyte # : x  Auto Eosinophil # : x  Auto Basophil # : x  Auto Neutrophil % : x  Auto Lymphocyte % : x  Auto Monocyte % : x  Auto Eosinophil % : x  Auto Basophil % : x    07-26    141  |  107  |  10  ----------------------------<  101<H>  4.0   |  24  |  1.10    Ca    8.2<L>      26 Jul 2017 03:41

## 2017-07-27 NOTE — CONSULT NOTE ADULT - ASSESSMENT
73 yo M with complicated PMH as above admitted with GI bleeding found to have bleeding AVM at duodenal ulcer.    - no further bleeding reported.  - Hgb 8.4 today, slightly decreased from yesterday, has been ranging from 8-10, monitor CBC daily, coags normal, plts normal.  - will check iron studies and B12 and assess for benefit of more IV iron. Pt was receiving B12 injections prior to admission  - follow up capsule study    Thank you for this consult will follow with you

## 2017-07-27 NOTE — PROGRESS NOTE ADULT - SUBJECTIVE AND OBJECTIVE BOX
CHIEF COMPLAINT:Patient is a 72y old  Male who presents with a chief complaint of BLOODY STOOL X3 (20 Jul 2017 21:22)    	  Interval history: reports some dizziness      Allergies:  penicillin (Pruritus)      PAST MEDICAL & SURGICAL HISTORY:  Heartburn  Coronary artery disease involving native heart with angina pectoris, unspecified vessel or lesion type: Non-obstructive CAD.  Gastrostomy in place: surgical gastrostomy, NOT a PEG  Ischemic bowel disease: s/p exploratory laparotomy and creation of ileostomy 8/2013  Hypothyroid  Seizures: 8/2014 on keppra last EEG - no activity  CVA (cerebral infarction): with RUE weakness  Anoxic brain injury  Aortic aneurysm rupture: with residual Type B dissection  Aortic aneurysm, abdominal  Aortic aneurysm and dissection: type A with rupture 8/2013  Gout  BPH (Benign Prostatic Hyperplasia)  HTN - Hypertension  H/O ischemic bowel disease: s/p resection in 2013  History of tracheostomy: on 8/2013 with decannulation  Ascending aortic dissection: s/p repair on 8/19/2013  S/P ileostomy: exploratory laparotomy, creation of ileostomy and PEG on 8/24/13 due to ischemic bowel      FAMILY HISTORY:  Family history of stroke  Family history of diabetes mellitus type II (Mother)      REVIEW OF SYSTEMS:  CONSTITUTIONAL: No fever, weight loss, or fatigue  EYES: No eye pain, visual disturbances, or discharge  NECK: No pain or stiffness  RESPIRATORY: No cough or wheezing, no shortness of breath  CARDIOVASCULAR: No chest pain, palpitations, or leg swelling, +dizziness  GASTROINTESTINAL: No abdominal or epigastric pain. No nausea, vomiting, diarrhea or constipation  GENITOURINARY: No dysuria, urinary frequency or urgency, no hematuria  NEUROLOGICAL: No headaches, memory loss, loss of strength, numbness, or tremors  SKIN: No itching, burning, rashes, or lesions   MUSCULOSKELETAL: No joint pain or swelling; No muscle, back, or extremity pain      Medications:  MEDICATIONS  (STANDING):  finasteride 5 milliGRAM(s) Oral daily  allopurinol 100 milliGRAM(s) Oral two times a day after meals  levothyroxine 50 MICROGram(s) Oral daily  tamsulosin 0.4 milliGRAM(s) Oral at bedtime  pantoprazole Infusion 8 mG/Hr (10 mL/Hr) IV Continuous <Continuous>  sodium chloride 0.9%. 1000 milliLiter(s) (50 mL/Hr) IV Continuous <Continuous>  Nuedexta (Dextromethorphan and Quinidine 1 Capsule(s) 1 Capsule(s) Oral two times a day    MEDICATIONS  (PRN):    	    PHYSICAL EXAM:  T(C): 36.8 (07-27-17 @ 21:33), Max: 36.8 (07-27-17 @ 12:05)  HR: 73 (07-27-17 @ 21:33) (73 - 79)  BP: 117/63 (07-27-17 @ 21:33) (108/66 - 136/80)  RR: 18 (07-27-17 @ 21:33) (18 - 18)  SpO2: 99% (07-27-17 @ 21:33) (96% - 99%)  Wt(kg): --  I&O's Summary    26 Jul 2017 07:01  -  27 Jul 2017 07:00  --------------------------------------------------------  IN: 1160 mL / OUT: 0 mL / NET: 1160 mL    27 Jul 2017 07:01  -  27 Jul 2017 22:05  --------------------------------------------------------  IN: 760 mL / OUT: 0 mL / NET: 760 mL        Appearance: Normal	  HEENT:   NCAT, PERRL, EOMI	  Lymphatic: No lymphadenopathy  Cardiovascular: Normal S1 S2, RRR  Respiratory: Lungs clear to auscultation BL  Psychiatry: A & O x 3, Mood & affect appropriate  Gastrointestinal:  Soft, Non-tender, + BS  Skin: No rashes, No ecchymoses, No cyanosis	  Neurologic: Non-focal  Extremities: Normal range of motion, No clubbing, cyanosis or edema    	  LABS:	 	    CARDIAC MARKERS:                                8.4    3.49  )-----------( 214      ( 27 Jul 2017 07:16 )             25.6     07-26    141  |  107  |  10  ----------------------------<  101<H>  4.0   |  24  |  1.10    Ca    8.2<L>      26 Jul 2017 03:41      proBNP:   Lipid Profile:   HgA1c:   TSH:

## 2017-07-27 NOTE — PROGRESS NOTE ADULT - ASSESSMENT
73 yo Male  as above:  1. Rectal bleeding - no more melena/BRBPR, s/p AVM treatment, f/u capsule, GI, Heme eval for other causes of anemia, as pt Hb trending down despite no bleeding, will hold off transfusion for now  2. Hx of aortic disection - no acute disection on CT  3. HTN - BP at goal  4. Hypothyroidism - c/w Synthroid  5. Gout - c/w Allopurinol  6. BPH - c/w Finasteride,  7. Mechanical DVT prophylaxis

## 2017-07-28 LAB
ANION GAP SERPL CALC-SCNC: 11 MMOL/L — SIGNIFICANT CHANGE UP (ref 5–17)
BUN SERPL-MCNC: 6 MG/DL — LOW (ref 7–23)
CALCIUM SERPL-MCNC: 7.8 MG/DL — LOW (ref 8.4–10.5)
CHLORIDE SERPL-SCNC: 105 MMOL/L — SIGNIFICANT CHANGE UP (ref 96–108)
CO2 SERPL-SCNC: 25 MMOL/L — SIGNIFICANT CHANGE UP (ref 22–31)
CREAT SERPL-MCNC: 1.1 MG/DL — SIGNIFICANT CHANGE UP (ref 0.5–1.3)
FERRITIN SERPL-MCNC: 125 NG/ML — SIGNIFICANT CHANGE UP (ref 30–400)
FOLATE SERPL-MCNC: 15.8 NG/ML — SIGNIFICANT CHANGE UP (ref 4.8–24.2)
GLUCOSE SERPL-MCNC: 88 MG/DL — SIGNIFICANT CHANGE UP (ref 70–99)
HAPTOGLOB SERPL-MCNC: 164 MG/DL — SIGNIFICANT CHANGE UP (ref 34–200)
HCT VFR BLD CALC: 26.5 % — LOW (ref 39–50)
HGB BLD-MCNC: 8.5 G/DL — LOW (ref 13–17)
IRON SATN MFR SERPL: 11 % — LOW (ref 16–55)
IRON SATN MFR SERPL: 22 UG/DL — LOW (ref 45–165)
LDH SERPL L TO P-CCNC: 268 U/L — HIGH (ref 50–242)
MAGNESIUM SERPL-MCNC: 1.9 MG/DL — SIGNIFICANT CHANGE UP (ref 1.6–2.6)
MCHC RBC-ENTMCNC: 28.1 PG — SIGNIFICANT CHANGE UP (ref 27–34)
MCHC RBC-ENTMCNC: 32.1 GM/DL — SIGNIFICANT CHANGE UP (ref 32–36)
MCV RBC AUTO: 87.7 FL — SIGNIFICANT CHANGE UP (ref 80–100)
PLATELET # BLD AUTO: 212 K/UL — SIGNIFICANT CHANGE UP (ref 150–400)
POTASSIUM SERPL-MCNC: 3.7 MMOL/L — SIGNIFICANT CHANGE UP (ref 3.5–5.3)
POTASSIUM SERPL-SCNC: 3.7 MMOL/L — SIGNIFICANT CHANGE UP (ref 3.5–5.3)
RBC # BLD: 3.02 M/UL — LOW (ref 4.2–5.8)
RBC # BLD: 3.02 M/UL — LOW (ref 4.2–5.8)
RBC # FLD: 17.2 % — HIGH (ref 10.3–14.5)
RETICS #: 108.1 K/UL — SIGNIFICANT CHANGE UP (ref 25–125)
RETICS/RBC NFR: 3.6 % — HIGH (ref 0.5–2.5)
SODIUM SERPL-SCNC: 141 MMOL/L — SIGNIFICANT CHANGE UP (ref 135–145)
TIBC SERPL-MCNC: 203 UG/DL — LOW (ref 220–430)
UIBC SERPL-MCNC: 181 UG/DL — SIGNIFICANT CHANGE UP (ref 110–370)
VIT B12 SERPL-MCNC: 566 PG/ML — SIGNIFICANT CHANGE UP (ref 243–894)
WBC # BLD: 4.27 K/UL — SIGNIFICANT CHANGE UP (ref 3.8–10.5)
WBC # FLD AUTO: 4.27 K/UL — SIGNIFICANT CHANGE UP (ref 3.8–10.5)

## 2017-07-28 RX ORDER — IRON SUCROSE 20 MG/ML
200 INJECTION, SOLUTION INTRAVENOUS DAILY
Qty: 0 | Refills: 0 | Status: COMPLETED | OUTPATIENT
Start: 2017-07-28 | End: 2017-07-30

## 2017-07-28 RX ADMIN — Medication 100 MILLIGRAM(S): at 09:20

## 2017-07-28 RX ADMIN — Medication 100 MILLIGRAM(S): at 18:32

## 2017-07-28 RX ADMIN — IRON SUCROSE 110 MILLIGRAM(S): 20 INJECTION, SOLUTION INTRAVENOUS at 11:40

## 2017-07-28 RX ADMIN — TAMSULOSIN HYDROCHLORIDE 0.4 MILLIGRAM(S): 0.4 CAPSULE ORAL at 21:51

## 2017-07-28 RX ADMIN — PANTOPRAZOLE SODIUM 10 MG/HR: 20 TABLET, DELAYED RELEASE ORAL at 06:05

## 2017-07-28 RX ADMIN — Medication 50 MICROGRAM(S): at 06:06

## 2017-07-28 RX ADMIN — PANTOPRAZOLE SODIUM 10 MG/HR: 20 TABLET, DELAYED RELEASE ORAL at 16:39

## 2017-07-28 RX ADMIN — FINASTERIDE 5 MILLIGRAM(S): 5 TABLET, FILM COATED ORAL at 11:40

## 2017-07-28 RX ADMIN — PANTOPRAZOLE SODIUM 10 MG/HR: 20 TABLET, DELAYED RELEASE ORAL at 21:51

## 2017-07-28 NOTE — PROGRESS NOTE ADULT - SUBJECTIVE AND OBJECTIVE BOX
Chief Complaint: some abdominal discomfort    History of Present Illness:    The patient is having some abdominal discomfort.  No fevers.  No chest pain.  No pleuritic chest pain.  No increasing SOB.  No nausea.  No vomiting.  No further blood in his stools.  He did feel a little dizzy this morning.  Overall is still feeling weak.        MEDICATIONS  (STANDING):  finasteride 5 milliGRAM(s) Oral daily  allopurinol 100 milliGRAM(s) Oral two times a day after meals  levothyroxine 50 MICROGram(s) Oral daily  tamsulosin 0.4 milliGRAM(s) Oral at bedtime  pantoprazole Infusion 8 mG/Hr (10 mL/Hr) IV Continuous <Continuous>  sodium chloride 0.9%. 1000 milliLiter(s) (50 mL/Hr) IV Continuous <Continuous>  Nuedexta (Dextromethorphan and Quinidine 1 Capsule(s) 1 Capsule(s) Oral two times a day    MEDICATIONS  (PRN):      Allergies    penicillin (Pruritus)    Intolerances        Vital Signs Last 24 Hrs  T(C): 36.6 (28 Jul 2017 04:02), Max: 36.8 (27 Jul 2017 12:05)  T(F): 97.8 (28 Jul 2017 04:02), Max: 98.2 (27 Jul 2017 12:05)  HR: 70 (28 Jul 2017 04:02) (70 - 79)  BP: 118/68 (28 Jul 2017 04:02) (108/66 - 118/68)  BP(mean): --  RR: 18 (28 Jul 2017 04:02) (18 - 18)  SpO2: 95% (28 Jul 2017 04:02) (95% - 99%)    PHYSICAL EXAM  General: NAD  HEENT: clear oropharynx, anicteric sclera, pink conjunctiva  Neck: supple  CV: normal S1/S2 regular  Lungs: clear to auscultation, no wheezes, no rales  Abdomen: soft non-tender non-distended,  positive bowel sounds  Ext: no clubbing cyanosis or edema  Skin: no rashes and no petechiae  Lymph Nodes: No LAD in neck  Neuro: alert and oriented X 3    LABS:                          8.4    3.49  )-----------( 214      ( 27 Jul 2017 07:16 )             25.6         Mean Cell Volume : 87.4 fl  Mean Cell Hemoglobin : 28.7 pg  Mean Cell Hemoglobin Concentration : 32.8 gm/dL  Auto Neutrophil # : x  Auto Lymphocyte # : x  Auto Monocyte # : x  Auto Eosinophil # : x  Auto Basophil # : x  Auto Neutrophil % : x  Auto Lymphocyte % : x  Auto Monocyte % : x  Auto Eosinophil % : x  Auto Basophil % : x      Serial CBC's  07-27 @ 07:16  Hct-25.6 / Hgb-8.4 / Plat-214 / RBC-2.93 / WBC-3.49  Serial CBC's  07-26 @ 03:41  Hct-27.0 / Hgb-9.3 / Plat-195 / RBC-3.05 / WBC-5.2  Serial CBC's  07-25 @ 11:58  Hct-23.9 / Hgb-8.0 / Plat-207 / RBC-2.68 / WBC-4.4  Serial CBC's  07-25 @ 08:58  Hct-22.2 / Hgb-7.4 / Plat-190 / RBC-2.59 / WBC-4.27  Serial CBC's  07-24 @ 07:50  Hct-26.1 / Hgb-8.7 / Plat-190 / RBC-3.05 / WBC-6.28                  Ferritin, Serum: 125.0 ng/mL (07-27 @ 23:41)  Iron - Total Binding Capacity.: 203 ug/dL (07-27 @ 23:41)  Vitamin B12, Serum: 566 pg/mL (07-27 @ 23:41)

## 2017-07-28 NOTE — PROGRESS NOTE ADULT - SUBJECTIVE AND OBJECTIVE BOX
INTERVAL HPI/OVERNIGHT EVENTS: stable from GI standpoint, no bleeding, capsule study downloaded overnight    MEDICATIONS  (STANDING):  finasteride 5 milliGRAM(s) Oral daily  allopurinol 100 milliGRAM(s) Oral two times a day after meals  levothyroxine 50 MICROGram(s) Oral daily  tamsulosin 0.4 milliGRAM(s) Oral at bedtime  pantoprazole Infusion 8 mG/Hr (10 mL/Hr) IV Continuous <Continuous>  sodium chloride 0.9%. 1000 milliLiter(s) (50 mL/Hr) IV Continuous <Continuous>  Nuedexta (Dextromethorphan and Quinidine 1 Capsule(s) 1 Capsule(s) Oral two times a day  iron sucrose IVPB 200 milliGRAM(s) IV Intermittent daily    MEDICATIONS  (PRN):      Allergies    penicillin (Pruritus)    Intolerances            PHYSICAL EXAM:   Vital Signs:  Vital Signs Last 24 Hrs  T(C): 36.6 (28 Jul 2017 04:02), Max: 36.8 (27 Jul 2017 12:05)  T(F): 97.8 (28 Jul 2017 04:02), Max: 98.2 (27 Jul 2017 12:05)  HR: 70 (28 Jul 2017 04:02) (70 - 79)  BP: 118/68 (28 Jul 2017 04:02) (108/66 - 118/68)  BP(mean): --  RR: 18 (28 Jul 2017 04:02) (18 - 18)  SpO2: 95% (28 Jul 2017 04:02) (95% - 99%)  Daily     Daily     GENERAL:  no distress  HEENT:  NC/AT,  anicteric  CHEST:   no increased effort, breath sounds clear  HEART:  Regular rhythm  ABDOMEN:  Soft, non-tender, non-distended, normoactive bowel sounds,  no masses ,no hepato-splenomegaly, no signs of chronic liver disease  EXTEREMITIES:  no cyanosis      LABS:                        8.4    3.49  )-----------( 214      ( 27 Jul 2017 07:16 )             25.6                 RADIOLOGY & ADDITIONAL TESTS:

## 2017-07-28 NOTE — PROGRESS NOTE ADULT - ASSESSMENT
[facility]    [address] [place] [address]    Phone:  [phone]       Thank You for choosing us for your health care visit. We are glad to serve you and happy to provide you with this summary of your visit. Please help us to ensure we have accurate records. If you find anything that needs to be changed, please let our staff know as soon as possible.          Your Demographic Information     Patient Name Sex     Adri Miller Female 2008       Ethnic Group Patient Race    Not of  or  Origin Unknown      Your Visit Details     Date & Time Provider Department    2017 10:00 AM Arpit Bautista MD [facility]      Your Upcoming Appointment*(Max 10)     2017 10:00 AM CDT   Nurse Visit with [facility] NURSE [address]   [facility] ([facility])    [address] [place] [address] [phone]              Your To Do List     Follow-Up    Return if symptoms worsen or fail to improve.      Conditions Discussed Today or Order-Related Diagnoses        Comments    Acute streptococcal pharyngitis    -  Primary       Your Vitals Were     BP Pulse Temp Resp Weight SpO2    98/68 (57 %/ 84 %)* 72 100.9 °F (38.3 °C) (Temporal Artery) 20 55 lb (24.9 kg) (72 %, Z= 0.58)† 98%    BMI Smoking Status                17.14 kg/m2 (81 %, Z= 0.87)† Never Smoker        *BP percentiles are based on NHBPEP's 4th Report    †Growth percentiles are based on CDC 2-20 Years data.      Medications Prescribed or Re-Ordered Today     amoxicillin (AMOXIL) 200 MG/5ML suspension    Sig - Route: Take 5 mLs by mouth 3 times daily for 10 days. - Oral    Class: Eprescribe    [id number] [address] #: [phone]      Your Current Medications Are        Disp Refills Start End    triamcinolone (ARISTOCORT) 0.1 % cream  80 g 0 1/24/2017     Sig - Route: Apply topically 2 times daily. - Topical    Class: Eprescribe    methylphenidate (RITALIN LA) 20 MG CR capsule 30 capsule 0 1/24/2017     Sig - Route: Take 1 capsule by mouth every morning. - Oral    cloNIDine (CATAPRES) 0.1 MG tablet 30 tablet 3 1/24/2017     Sig - Route: Take 1 tablet by mouth nightly as needed (sleeping difficulties). - Oral    Class: Eprescribe    methylphenidate (RITALIN) 20 MG tablet 30 tablet 0 1/24/2017     Sig - Route: Take 1 tablet by mouth daily. Give at noon - Oral    ibuprofen (MOTRIN) 100 MG/5ML suspension        Sig - Route: Take by mouth every 8 hours as needed for Fever. - Oral    Class: Historical Med    Pediatric Multivit-Minerals-C (KIDS GUMMY BEAR VITAMINS) CHEW        Sig - Route: Chew  by mouth. - Oral    Class: Historical Med    amoxicillin (AMOXIL) 200 MG/5ML suspension 150 mL 0 1/29/2017 2/8/2017    Sig - Route: Take 5 mLs by mouth 3 times daily for 10 days. - Oral    Class: Eprescribe      Allergies     No Known Allergies      Immunizations History as of 1/29/2017     Name Date    DTaP/HIB/IPV 5/23/2011, 2010, 2010, 2010    DTaP/IPV 2/23/2015    HEPATITIS A CHILD 8/22/2011, 2/22/2011    Hepatitis B Child 2010, 2010, 2010    INFLUENZA QUADRIVALENT 10/3/2016    Influenza 10/11/2011, 2010, 2010    Influenza Live Trivalent 9/21/2012    Influenza Quadrivalent Live 1/19/2016, 11/11/2014, 2/20/2014    MMR 5/23/2011    MMRV 2/23/2015    Pneumococcal Conjugate 13 Valent 2/22/2011, 2010, 2010, 2010    Rotavirus - Pentavalent 2010, 2010, 2010    Varicella 5/23/2011      Problem List as of 1/29/2017     Keratosis pilaris    Atopic dermatitis    Delayed speech    Developmental delay    Obesity peds (BMI >=95 percentile)            Patient Instructions     None       73 y/o male with a PMH of aortic dissection complicated by ischemic bowel s/p resection, tracheostomy s/p reversal, ileostomy s/p reversal, right arm paralysis, HTN, gout, seizures and hypothyroidism admitted with GI bleeding found to have bleeding AVM at duodenal ulcer.    1. Normocytic Anemia Secondary GIB / iron deficinecy  - EGD/colonoscopy on 7-21-1- that showed duodenal ulcer and ulcer at the ileal-colonic anastamosis  - Capsule study pending per GI  - S/P 2 units pRBC on 7-23-17 and 1 unit pRBC on 7-25-17  - Iron 22, percent sat 11 and ferritin 125 on 7-27-17 (these labs are after 3 unit pRBC so with iron deficeincy) - will start IV venofer daily X 3 days  -  Normal B12 566.  Folate, LDH, hapto, retic - pending  -  Hemoglobin 8.4 on 7-27-17 was trending down will monitor    Follow up labs from today  Will start IV iron, follow up capsule study

## 2017-07-28 NOTE — PROGRESS NOTE ADULT - ASSESSMENT
73 yo Male  as above:  1. Rectal bleeding - no more melena/BRBPR, s/p AVM treatment, s/p normal capsule, f/u GI, c/w IV Iron per Heme, d/c planning  2. Hx of aortic disection - no acute disection on CT  3. HTN - BP at goal  4. Hypothyroidism - c/w Synthroid  5. Gout - c/w Allopurinol  6. BPH - c/w Finasteride,  7. Mechanical DVT prophylaxis

## 2017-07-28 NOTE — PROGRESS NOTE ADULT - SUBJECTIVE AND OBJECTIVE BOX
CHIEF COMPLAINT:Patient is a 72y old  Male who presents with a chief complaint of BLOODY STOOL X3 (20 Jul 2017 21:22)    	  Interval history: feeling better      Allergies:  penicillin (Pruritus)      PAST MEDICAL & SURGICAL HISTORY:  Heartburn  Coronary artery disease involving native heart with angina pectoris, unspecified vessel or lesion type: Non-obstructive CAD.  Gastrostomy in place: surgical gastrostomy, NOT a PEG  Ischemic bowel disease: s/p exploratory laparotomy and creation of ileostomy 8/2013  Hypothyroid  Seizures: 8/2014 on keppra last EEG - no activity  CVA (cerebral infarction): with RUE weakness  Anoxic brain injury  Aortic aneurysm rupture: with residual Type B dissection  Aortic aneurysm, abdominal  Aortic aneurysm and dissection: type A with rupture 8/2013  Gout  BPH (Benign Prostatic Hyperplasia)  HTN - Hypertension  H/O ischemic bowel disease: s/p resection in 2013  History of tracheostomy: on 8/2013 with decannulation  Ascending aortic dissection: s/p repair on 8/19/2013  S/P ileostomy: exploratory laparotomy, creation of ileostomy and PEG on 8/24/13 due to ischemic bowel      FAMILY HISTORY:  Family history of stroke  Family history of diabetes mellitus type II (Mother)      REVIEW OF SYSTEMS:  CONSTITUTIONAL: No fever, weight loss, or fatigue  EYES: No eye pain, visual disturbances, or discharge  NECK: No pain or stiffness  RESPIRATORY: No cough or wheezing, no shortness of breath  CARDIOVASCULAR: No chest pain, palpitations, dizziness, or leg swelling  GASTROINTESTINAL: No abdominal or epigastric pain. No nausea, vomiting, diarrhea or constipation  GENITOURINARY: No dysuria, urinary frequency or urgency, no hematuria  NEUROLOGICAL: No headaches, memory loss, loss of strength, numbness, or tremors  SKIN: No itching, burning, rashes, or lesions   MUSCULOSKELETAL: No joint pain or swelling; No muscle, back, or extremity pain    Medications:  MEDICATIONS  (STANDING):  finasteride 5 milliGRAM(s) Oral daily  allopurinol 100 milliGRAM(s) Oral two times a day after meals  levothyroxine 50 MICROGram(s) Oral daily  tamsulosin 0.4 milliGRAM(s) Oral at bedtime  pantoprazole Infusion 8 mG/Hr (10 mL/Hr) IV Continuous <Continuous>  sodium chloride 0.9%. 1000 milliLiter(s) (50 mL/Hr) IV Continuous <Continuous>  Nuedexta (Dextromethorphan and Quinidine 1 Capsule(s) 1 Capsule(s) Oral two times a day  iron sucrose IVPB 200 milliGRAM(s) IV Intermittent daily    MEDICATIONS  (PRN):    	    PHYSICAL EXAM:  T(C): 36.3 (07-28-17 @ 11:52), Max: 36.8 (07-27-17 @ 21:33)  HR: 78 (07-28-17 @ 11:52) (70 - 78)  BP: 123/78 (07-28-17 @ 11:52) (117/63 - 123/78)  RR: 18 (07-28-17 @ 11:52) (18 - 18)  SpO2: 99% (07-28-17 @ 11:52) (95% - 99%)  Wt(kg): --  I&O's Summary    27 Jul 2017 07:01  -  28 Jul 2017 07:00  --------------------------------------------------------  IN: 1200 mL / OUT: 0 mL / NET: 1200 mL    28 Jul 2017 07:01  -  28 Jul 2017 16:50  --------------------------------------------------------  IN: 1060 mL / OUT: 0 mL / NET: 1060 mL        Appearance: Normal	  HEENT:   NCAT, PERRL, EOMI	  Lymphatic: No lymphadenopathy  Cardiovascular: Normal S1 S2, RRR  Respiratory: Lungs clear to auscultation BL  Psychiatry: A & O x 3, Mood & affect appropriate  Gastrointestinal:  Soft, Non-tender, + BS  Skin: No rashes, No ecchymoses, No cyanosis	  Neurologic: Non-focal  Extremities: Normal range of motion, No clubbing, cyanosis or edema    	  LABS:	 	    CARDIAC MARKERS:                                8.5    4.27  )-----------( 212      ( 28 Jul 2017 08:47 )             26.5     07-28    141  |  105  |  6<L>  ----------------------------<  88  3.7   |  25  |  1.10    Ca    7.8<L>      28 Jul 2017 10:15  Mg     1.9     07-28      proBNP:   Lipid Profile:   HgA1c:   TSH:

## 2017-07-29 LAB
BASOPHILS # BLD AUTO: 0.01 K/UL — SIGNIFICANT CHANGE UP (ref 0–0.2)
BASOPHILS NFR BLD AUTO: 0.2 % — SIGNIFICANT CHANGE UP (ref 0–2)
EOSINOPHIL # BLD AUTO: 0.22 K/UL — SIGNIFICANT CHANGE UP (ref 0–0.5)
EOSINOPHIL NFR BLD AUTO: 4.8 % — SIGNIFICANT CHANGE UP (ref 0–6)
HCT VFR BLD CALC: 27.8 % — LOW (ref 39–50)
HGB BLD-MCNC: 8.9 G/DL — LOW (ref 13–17)
IMM GRANULOCYTES NFR BLD AUTO: 0.2 % — SIGNIFICANT CHANGE UP (ref 0–1.5)
LYMPHOCYTES # BLD AUTO: 1.26 K/UL — SIGNIFICANT CHANGE UP (ref 1–3.3)
LYMPHOCYTES # BLD AUTO: 27.5 % — SIGNIFICANT CHANGE UP (ref 13–44)
MCHC RBC-ENTMCNC: 28.2 PG — SIGNIFICANT CHANGE UP (ref 27–34)
MCHC RBC-ENTMCNC: 32 GM/DL — SIGNIFICANT CHANGE UP (ref 32–36)
MCV RBC AUTO: 88 FL — SIGNIFICANT CHANGE UP (ref 80–100)
MONOCYTES # BLD AUTO: 0.46 K/UL — SIGNIFICANT CHANGE UP (ref 0–0.9)
MONOCYTES NFR BLD AUTO: 10 % — SIGNIFICANT CHANGE UP (ref 2–14)
NEUTROPHILS # BLD AUTO: 2.62 K/UL — SIGNIFICANT CHANGE UP (ref 1.8–7.4)
NEUTROPHILS NFR BLD AUTO: 57.3 % — SIGNIFICANT CHANGE UP (ref 43–77)
PLATELET # BLD AUTO: 251 K/UL — SIGNIFICANT CHANGE UP (ref 150–400)
RBC # BLD: 3.16 M/UL — LOW (ref 4.2–5.8)
RBC # FLD: 17 % — HIGH (ref 10.3–14.5)
WBC # BLD: 4.58 K/UL — SIGNIFICANT CHANGE UP (ref 3.8–10.5)
WBC # FLD AUTO: 4.58 K/UL — SIGNIFICANT CHANGE UP (ref 3.8–10.5)

## 2017-07-29 RX ADMIN — Medication 100 MILLIGRAM(S): at 17:59

## 2017-07-29 RX ADMIN — IRON SUCROSE 110 MILLIGRAM(S): 20 INJECTION, SOLUTION INTRAVENOUS at 12:13

## 2017-07-29 RX ADMIN — PANTOPRAZOLE SODIUM 10 MG/HR: 20 TABLET, DELAYED RELEASE ORAL at 10:06

## 2017-07-29 RX ADMIN — Medication 100 MILLIGRAM(S): at 08:49

## 2017-07-29 RX ADMIN — Medication 50 MICROGRAM(S): at 06:01

## 2017-07-29 RX ADMIN — PANTOPRAZOLE SODIUM 10 MG/HR: 20 TABLET, DELAYED RELEASE ORAL at 19:19

## 2017-07-29 RX ADMIN — FINASTERIDE 5 MILLIGRAM(S): 5 TABLET, FILM COATED ORAL at 12:13

## 2017-07-29 RX ADMIN — TAMSULOSIN HYDROCHLORIDE 0.4 MILLIGRAM(S): 0.4 CAPSULE ORAL at 21:23

## 2017-07-29 NOTE — PROGRESS NOTE ADULT - ASSESSMENT
71 yo Male  as above:  1. Rectal bleeding - no more melena/BRBPR, s/p AVM treatment, s/p normal capsule, f/u GI, c/w IV Iron per Heme,   2. Hx of aortic disection - no acute disection on CT  3. HTN - BP at goal  4. Hypothyroidism - c/w Synthroid  5. Gout - c/w Allopurinol  6. BPH - c/w Finasteride,  7. Mechanical DVT prophylaxis  d/c home if ok w/ gi

## 2017-07-29 NOTE — PROGRESS NOTE ADULT - SUBJECTIVE AND OBJECTIVE BOX
INTERVAL HPI/OVERNIGHT EVENTS: stable, no bleeding. Discussed capsule results with wife. No source of bleeding but study was incomplete as capsule battery ran out before reaching colon, will need XRay in 2 weeks to confirm passage.     MEDICATIONS  (STANDING):  finasteride 5 milliGRAM(s) Oral daily  allopurinol 100 milliGRAM(s) Oral two times a day after meals  levothyroxine 50 MICROGram(s) Oral daily  tamsulosin 0.4 milliGRAM(s) Oral at bedtime  pantoprazole Infusion 8 mG/Hr (10 mL/Hr) IV Continuous <Continuous>  sodium chloride 0.9%. 1000 milliLiter(s) (50 mL/Hr) IV Continuous <Continuous>  Nuedexta (Dextromethorphan and Quinidine 1 Capsule(s) 1 Capsule(s) Oral two times a day  iron sucrose IVPB 200 milliGRAM(s) IV Intermittent daily    MEDICATIONS  (PRN):      Allergies    penicillin (Pruritus)    Intolerances            PHYSICAL EXAM:   Vital Signs:  Vital Signs Last 24 Hrs  T(C): 36.4 (29 Jul 2017 11:48), Max: 36.5 (29 Jul 2017 04:04)  T(F): 97.5 (29 Jul 2017 11:48), Max: 97.7 (29 Jul 2017 04:04)  HR: 71 (29 Jul 2017 11:48) (66 - 71)  BP: 130/82 (29 Jul 2017 11:48) (130/82 - 133/77)  BP(mean): --  RR: 18 (29 Jul 2017 11:48) (18 - 18)  SpO2: 94% (29 Jul 2017 11:48) (94% - 96%)  Daily     Daily     GENERAL:  no distress  HEENT:  NC/AT,  anicteric  CHEST:   no increased effort, breath sounds clear  HEART:  Regular rhythm  ABDOMEN:  Soft, non-tender, non-distended, normoactive bowel sounds,  no masses ,no hepato-splenomegaly, no signs of chronic liver disease  EXTEREMITIES:  no cyanosis      LABS:                        8.9    4.58  )-----------( 251      ( 29 Jul 2017 08:45 )             27.8     07-28    141  |  105  |  6<L>  ----------------------------<  88  3.7   |  25  |  1.10    Ca    7.8<L>      28 Jul 2017 10:15  Mg     1.9     07-28            RADIOLOGY & ADDITIONAL TESTS:

## 2017-07-29 NOTE — PROGRESS NOTE ADULT - SUBJECTIVE AND OBJECTIVE BOX
CHIEF COMPLAINT:Patient is a 72y old  Male who presents with a chief complaint of BLOODY STOOL X3 (20 Jul 2017 21:22)    	  feels better      PAST MEDICAL & SURGICAL HISTORY:  Heartburn  Coronary artery disease involving native heart with angina pectoris, unspecified vessel or lesion type: Non-obstructive CAD.  Gastrostomy in place: surgical gastrostomy, NOT a PEG  Ischemic bowel disease: s/p exploratory laparotomy and creation of ileostomy 8/2013  Hypothyroid  Seizures: 8/2014 on keppra last EEG - no activity  CVA (cerebral infarction): with RUE weakness  Anoxic brain injury  Aortic aneurysm rupture: with residual Type B dissection  Aortic aneurysm, abdominal  Aortic aneurysm and dissection: type A with rupture 8/2013  Gout  BPH (Benign Prostatic Hyperplasia)  HTN - Hypertension  H/O ischemic bowel disease: s/p resection in 2013  History of tracheostomy: on 8/2013 with decannulation  Ascending aortic dissection: s/p repair on 8/19/2013  S/P ileostomy: exploratory laparotomy, creation of ileostomy and PEG on 8/24/13 due to ischemic bowel          REVIEW OF SYSTEMS:  CONSTITUTIONAL: No fever, weight loss, or fatigue  EYES: No eye pain, visual disturbances, or discharge  NECK: No pain or stiffness  RESPIRATORY: No cough, wheezing, chills or hemoptysis; No Shortness of Breath  CARDIOVASCULAR: No chest pain, palpitations, passing out, dizziness, or leg swelling  GASTROINTESTINAL: No abdominal or epigastric pain. No nausea, vomiting, or hematemesis; No diarrhea or constipation. No melena or hematochezia.  GENITOURINARY: No dysuria, frequency, hematuria, or incontinence  NEUROLOGICAL: No headaches, memory loss, loss of strength, numbness, or tremors  SKIN: No itching, burning, rashes, or lesions   LYMPH Nodes: No enlarged glands  ENDOCRINE: No heat or cold intolerance; No hair loss  MUSCULOSKELETAL: No joint pain or swelling; No muscle, back, or extremity pain    Medications:  MEDICATIONS  (STANDING):  finasteride 5 milliGRAM(s) Oral daily  allopurinol 100 milliGRAM(s) Oral two times a day after meals  levothyroxine 50 MICROGram(s) Oral daily  tamsulosin 0.4 milliGRAM(s) Oral at bedtime  pantoprazole Infusion 8 mG/Hr (10 mL/Hr) IV Continuous <Continuous>  sodium chloride 0.9%. 1000 milliLiter(s) (50 mL/Hr) IV Continuous <Continuous>  Nuedexta (Dextromethorphan and Quinidine 1 Capsule(s) 1 Capsule(s) Oral two times a day  iron sucrose IVPB 200 milliGRAM(s) IV Intermittent daily    MEDICATIONS  (PRN):    	    PHYSICAL EXAM:  T(C): 36.5 (07-29-17 @ 04:04), Max: 36.5 (07-29-17 @ 04:04)  HR: 66 (07-29-17 @ 04:04) (66 - 78)  BP: 133/77 (07-29-17 @ 04:04) (123/78 - 133/77)  RR: 18 (07-29-17 @ 04:04) (18 - 18)  SpO2: 96% (07-29-17 @ 04:04) (96% - 99%)  Wt(kg): --  I&O's Summary    28 Jul 2017 07:01  -  29 Jul 2017 07:00  --------------------------------------------------------  IN: 1540 mL / OUT: 0 mL / NET: 1540 mL        Appearance: Normal	  HEENT:   Normal oral mucosa, PERRL, EOMI	  Lymphatic: No lymphadenopathy  Cardiovascular: Normal S1 S2, No JVD, No murmurs, No edema  Respiratory: Lungs clear to auscultation	  Psychiatry: A & O x 3, Mood & affect appropriate  Gastrointestinal:  Soft, Non-tender, + BS	  Skin: No rashes, No ecchymoses, No cyanosis	  Neurologic: Non-focal  Extremities: Normal range of motion, No clubbing, cyanosis or edema  Vascular: Peripheral pulses palpable 2+ bilaterally    TELEMETRY: 	    ECG:  	  RADIOLOGY:  OTHER: 	  	  LABS:	 	    CARDIAC MARKERS:                                8.5    4.27  )-----------( 212      ( 28 Jul 2017 08:47 )             26.5     07-28    141  |  105  |  6<L>  ----------------------------<  88  3.7   |  25  |  1.10    Ca    7.8<L>      28 Jul 2017 10:15  Mg     1.9     07-28      proBNP:   Lipid Profile:   HgA1c:   TSH:

## 2017-07-30 ENCOUNTER — TRANSCRIPTION ENCOUNTER (OUTPATIENT)
Age: 72
End: 2017-07-30

## 2017-07-30 VITALS
TEMPERATURE: 98 F | RESPIRATION RATE: 17 BRPM | OXYGEN SATURATION: 97 % | DIASTOLIC BLOOD PRESSURE: 78 MMHG | HEART RATE: 71 BPM | SYSTOLIC BLOOD PRESSURE: 144 MMHG

## 2017-07-30 LAB
ANION GAP SERPL CALC-SCNC: 14 MMOL/L — SIGNIFICANT CHANGE UP (ref 5–17)
BUN SERPL-MCNC: 12 MG/DL — SIGNIFICANT CHANGE UP (ref 7–23)
CALCIUM SERPL-MCNC: 7.7 MG/DL — LOW (ref 8.4–10.5)
CHLORIDE SERPL-SCNC: 105 MMOL/L — SIGNIFICANT CHANGE UP (ref 96–108)
CO2 SERPL-SCNC: 23 MMOL/L — SIGNIFICANT CHANGE UP (ref 22–31)
CREAT SERPL-MCNC: 1.3 MG/DL — SIGNIFICANT CHANGE UP (ref 0.5–1.3)
GLUCOSE SERPL-MCNC: 89 MG/DL — SIGNIFICANT CHANGE UP (ref 70–99)
HCT VFR BLD CALC: 26.6 % — LOW (ref 39–50)
HGB BLD-MCNC: 8.3 G/DL — LOW (ref 13–17)
MCHC RBC-ENTMCNC: 27.9 PG — SIGNIFICANT CHANGE UP (ref 27–34)
MCHC RBC-ENTMCNC: 31.2 GM/DL — LOW (ref 32–36)
MCV RBC AUTO: 89.3 FL — SIGNIFICANT CHANGE UP (ref 80–100)
PLATELET # BLD AUTO: 255 K/UL — SIGNIFICANT CHANGE UP (ref 150–400)
POTASSIUM SERPL-MCNC: 4.2 MMOL/L — SIGNIFICANT CHANGE UP (ref 3.5–5.3)
POTASSIUM SERPL-SCNC: 4.2 MMOL/L — SIGNIFICANT CHANGE UP (ref 3.5–5.3)
RBC # BLD: 2.98 M/UL — LOW (ref 4.2–5.8)
RBC # FLD: 17.3 % — HIGH (ref 10.3–14.5)
SODIUM SERPL-SCNC: 142 MMOL/L — SIGNIFICANT CHANGE UP (ref 135–145)
WBC # BLD: 5.85 K/UL — SIGNIFICANT CHANGE UP (ref 3.8–10.5)
WBC # FLD AUTO: 5.85 K/UL — SIGNIFICANT CHANGE UP (ref 3.8–10.5)

## 2017-07-30 PROCEDURE — 82803 BLOOD GASES ANY COMBINATION: CPT

## 2017-07-30 PROCEDURE — 85014 HEMATOCRIT: CPT

## 2017-07-30 PROCEDURE — 85045 AUTOMATED RETICULOCYTE COUNT: CPT

## 2017-07-30 PROCEDURE — 80048 BASIC METABOLIC PNL TOTAL CA: CPT

## 2017-07-30 PROCEDURE — 99285 EMERGENCY DEPT VISIT HI MDM: CPT | Mod: 25

## 2017-07-30 PROCEDURE — 74174 CTA ABD&PLVS W/CONTRAST: CPT

## 2017-07-30 PROCEDURE — 84295 ASSAY OF SERUM SODIUM: CPT

## 2017-07-30 PROCEDURE — 86850 RBC ANTIBODY SCREEN: CPT

## 2017-07-30 PROCEDURE — 81003 URINALYSIS AUTO W/O SCOPE: CPT

## 2017-07-30 PROCEDURE — 85027 COMPLETE CBC AUTOMATED: CPT

## 2017-07-30 PROCEDURE — 82607 VITAMIN B-12: CPT

## 2017-07-30 PROCEDURE — 82728 ASSAY OF FERRITIN: CPT

## 2017-07-30 PROCEDURE — 85730 THROMBOPLASTIN TIME PARTIAL: CPT

## 2017-07-30 PROCEDURE — 83605 ASSAY OF LACTIC ACID: CPT

## 2017-07-30 PROCEDURE — 82435 ASSAY OF BLOOD CHLORIDE: CPT

## 2017-07-30 PROCEDURE — 86900 BLOOD TYPING SEROLOGIC ABO: CPT

## 2017-07-30 PROCEDURE — 82746 ASSAY OF FOLIC ACID SERUM: CPT

## 2017-07-30 PROCEDURE — P9016: CPT

## 2017-07-30 PROCEDURE — 82330 ASSAY OF CALCIUM: CPT

## 2017-07-30 PROCEDURE — 83010 ASSAY OF HAPTOGLOBIN QUANT: CPT

## 2017-07-30 PROCEDURE — 86901 BLOOD TYPING SEROLOGIC RH(D): CPT

## 2017-07-30 PROCEDURE — 83735 ASSAY OF MAGNESIUM: CPT

## 2017-07-30 PROCEDURE — 86923 COMPATIBILITY TEST ELECTRIC: CPT

## 2017-07-30 PROCEDURE — 84132 ASSAY OF SERUM POTASSIUM: CPT

## 2017-07-30 PROCEDURE — 82947 ASSAY GLUCOSE BLOOD QUANT: CPT

## 2017-07-30 PROCEDURE — 83615 LACTATE (LD) (LDH) ENZYME: CPT

## 2017-07-30 PROCEDURE — 93005 ELECTROCARDIOGRAM TRACING: CPT

## 2017-07-30 PROCEDURE — 83550 IRON BINDING TEST: CPT

## 2017-07-30 PROCEDURE — P9040: CPT

## 2017-07-30 PROCEDURE — 85610 PROTHROMBIN TIME: CPT

## 2017-07-30 PROCEDURE — 36430 TRANSFUSION BLD/BLD COMPNT: CPT

## 2017-07-30 PROCEDURE — 80053 COMPREHEN METABOLIC PANEL: CPT

## 2017-07-30 PROCEDURE — 84100 ASSAY OF PHOSPHORUS: CPT

## 2017-07-30 PROCEDURE — 74250 X-RAY XM SM INT 1CNTRST STD: CPT

## 2017-07-30 RX ADMIN — Medication 100 MILLIGRAM(S): at 10:20

## 2017-07-30 RX ADMIN — FINASTERIDE 5 MILLIGRAM(S): 5 TABLET, FILM COATED ORAL at 10:20

## 2017-07-30 RX ADMIN — Medication 50 MICROGRAM(S): at 06:01

## 2017-07-30 RX ADMIN — IRON SUCROSE 110 MILLIGRAM(S): 20 INJECTION, SOLUTION INTRAVENOUS at 12:58

## 2017-07-30 NOTE — DISCHARGE NOTE ADULT - ADDITIONAL INSTRUCTIONS
follow up with Dr. Lubin within 1 week  follow up with Dr. Keene/Juan José within 1 week  follow up with Dr. Torres

## 2017-07-30 NOTE — DISCHARGE NOTE ADULT - HOSPITAL COURSE
to be completed by attending 72 year old male with PMH of aortic dissection complicated by ischemic bowel s/p resection, tracheostomy s/p reversal, ileostomy s/p reversal, right arm paralysis, HTN, gout, seizures, BPH, and hypothyroidism; presents after lower GI bleeding associated with abdominal pain starting this morning, prompting him to go to the ED.    Dx: 	Rectal bleed   7/20	Dr. Can from GI called by Dr. Keith. Trend H/H and transfuse for Hgb <8. Keep 	NPO for now, IV NS while NPO. Hold ASA and anti-hypertensives for now.  7/21	s/p EGD: Chronic gastritis. One bleeding angiodysplastic lesion in the duodenum  	s/p colon: small ulcers, biopsied.   7/21 	Night PA: Pt c/o burning pain upon urination, UA ordered. .  7/22 	Night PA: Called by RN for H/H: 6.2/18.0, STAT CBC sent: H/H: 8.9/27.3.   7/23	2 units of PRBC H/H 7.1/22 7/24	GI: Need small bowel series and possible capsule. continue ppi drip. If hgb drops 	again or continued "dark stool" would do second look endosopcy.  7/25	GI: SB series negative. Plan is for capsule. clears today  	H/H dropped, 1 unit PRBC ordered per Dr. Mclaughlin  7/26	Plan for capsule study today  7/27 Hgb decreased to 8.4, no overt bleeding. Capsule study to be reviewed   7/28 Heme ([place]) 1. Normocytic Anemia Secondary GIB / iron deficinecy  - EGD/colonoscopy on 7-21-1- that showed duodenal ulcer and ulcer at the ileal-colonic anastamosis  - Capsule study pending per GI  - S/P 2 units pRBC on 7-23-17 and 1 unit pRBC on 7-25-17  - Iron 22, percent sat 11 and ferritin 125 on 7-27-17 (these labs are after 3 unit pRBC so with iron deficeincy) - will start IV venofer daily X 3 days  -  Normal B12 566.  Folate, LDH, hapto, retic - pending  -  Hgb  8.4 on 7/27.Monitor. Will start IV iron, follow up capsule study  pt remained stable   cleared for d/c w/ outpt f/u

## 2017-07-30 NOTE — DISCHARGE NOTE ADULT - PATIENT PORTAL LINK FT
“You can access the Follow[facility] Patient Portal, offered by [facility], by registering with the following website: [email or web address]”

## 2017-07-30 NOTE — DISCHARGE NOTE ADULT - INSTRUCTIONS
Please follow all discharge instructions provided. Schedule follow up appointment with mentioned Md.

## 2017-07-30 NOTE — PROGRESS NOTE ADULT - ASSESSMENT
71 yo Male  as above:  1. Rectal bleeding - no more melena/BRBPR, s/p AVM treatment, s/p normal capsule, f/u GI, c/w IV Iron per Heme, d/c home after 3rd dose given  2. Hx of aortic disection - no acute disection on CT  3. HTN - BP at goal  4. Hypothyroidism - c/w Synthroid  5. Gout - c/w Allopurinol  6. BPH - c/w Finasteride,  7. Mechanical DVT prophylaxis

## 2017-07-30 NOTE — PROGRESS NOTE ADULT - SUBJECTIVE AND OBJECTIVE BOX
Chief Complaint: f/u    History of Present Illness:  feels weak, but no bleeding, no recent BM, sitting in chair, no specific complaints    ROS: No CP, SOB, cough, F/C, no N/V/D/C, no bleeding, no dyusria    MEDICATIONS  (STANDING):  finasteride 5 milliGRAM(s) Oral daily  allopurinol 100 milliGRAM(s) Oral two times a day after meals  levothyroxine 50 MICROGram(s) Oral daily  tamsulosin 0.4 milliGRAM(s) Oral at bedtime  pantoprazole Infusion 8 mG/Hr (10 mL/Hr) IV Continuous <Continuous>  sodium chloride 0.9%. 1000 milliLiter(s) (50 mL/Hr) IV Continuous <Continuous>  Nuedexta (Dextromethorphan and Quinidine 1 Capsule(s) 1 Capsule(s) Oral two times a day  iron sucrose IVPB 200 milliGRAM(s) IV Intermittent daily    MEDICATIONS  (PRN):      Allergies    penicillin (Pruritus)    Intolerances        Vital Signs Last 24 Hrs  T(C): 37.2 (30 Jul 2017 04:14), Max: 37.2 (30 Jul 2017 04:14)  T(F): 98.9 (30 Jul 2017 04:14), Max: 98.9 (30 Jul 2017 04:14)  HR: 68 (30 Jul 2017 04:14) (68 - 73)  BP: 136/81 (30 Jul 2017 04:14) (126/76 - 136/81)  BP(mean): --  RR: 18 (30 Jul 2017 04:14) (17 - 18)  SpO2: 98% (30 Jul 2017 04:14) (94% - 98%)    PHYSICAL EXAM  General: NAD, sitting in chair, AA, comfortable, thin  HEENT: clear oropharynx, anicteric sclera, pink conjunctiva  Neck: supple, tracheal stoma s/p closure  CV: normal S1/S2 with no murmur rubs or gallops  Lungs: clear to auscultation, no wheezes, no rales  Abdomen: soft non-tender non-distended, no hepatosplenomegaly, positive bowel sounds, ventral hernias  Ext: no clubbing cyanosis or edema  Skin: no rashes and no petechiae  Neuro: alert and oriented X 3, no focal deficits    LABS:                          8.3    5.85  )-----------( 255      ( 30 Jul 2017 08:53 )             26.6         Mean Cell Volume : 89.3 fl  Mean Cell Hemoglobin : 27.9 pg  Mean Cell Hemoglobin Concentration : 31.2 gm/dL  Auto Neutrophil # : x  Auto Lymphocyte # : x  Auto Monocyte # : x  Auto Eosinophil # : x  Auto Basophil # : x  Auto Neutrophil % : x  Auto Lymphocyte % : x  Auto Monocyte % : x  Auto Eosinophil % : x  Auto Basophil % : x      Serial CBC's  07-30 @ 08:53  Hct-26.6 / Hgb-8.3 / Plat-255 / RBC-2.98 / WBC-5.85  Serial CBC's  07-29 @ 08:45  Hct-27.8 / Hgb-8.9 / Plat-251 / RBC-3.16 / WBC-4.58  Serial CBC's  07-28 @ 08:47  Hct-26.5 / Hgb-8.5 / Plat-212 / RBC-3.02 / WBC-4.27  Serial CBC's  07-27 @ 07:16  Hct-25.6 / Hgb-8.4 / Plat-214 / RBC-2.93 / WBC-3.49      07-30    142  |  105  |  12  ----------------------------<  89  4.2   |  23  |  1.30    Ca    7.7<L>      30 Jul 2017 08:26            Folate, Serum: 15.8 ng/mL (07-28 @ 10:15)  Reticulocyte Percent: 3.6 % (07-28 @ 08:47)  Ferritin, Serum: 125.0 ng/mL (07-27 @ 23:41)  Iron - Total Binding Capacity.: 203 ug/dL (07-27 @ 23:41)  Vitamin B12, Serum: 566 pg/mL (07-27 @ 23:41)          07-28 @ 10:15    ldh1 --  haptoglobin 164  JEFF--  uric acid--

## 2017-07-30 NOTE — PROGRESS NOTE ADULT - ASSESSMENT
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    1. Normocytic Anemia Secondary GIB / iron deficinecy  - EGD/colonoscopy on 7-21-1- that showed duodenal ulcer and ulcer at the ileal-colonic anastamosis  - Capsule study negative (but battery ran out in middle) f/u per GI  - S/P 2 units pRBC on 7-23-17 and 1 unit pRBC on 7-25-17  - Iron 22, percent sat 11 and ferritin 125 on 7-27-17 (these labs are after 3 unit pRBC so with iron deficeincy) - completing 3rd and final dose of IV venofer today  -  Normal B12 566.  Folate, LDH, hapto, retic - 3.6%  -  Hemoglobin 8.3 on 7-30-17 fluctuating between 8-9, monitor CBC, monitor for any new bleeding    OK to DC from heme standpoint when cleared by GI    F/U in office with myself or Dr. Lawrence in 2 weeks

## 2017-07-30 NOTE — DISCHARGE NOTE ADULT - CARE PROVIDERS DIRECT ADDRESSES
[email or web address]

## 2017-07-30 NOTE — DISCHARGE NOTE ADULT - CARE PLAN
Principal Discharge DX:	Rectal bleed  Secondary Diagnosis:	Essential hypertension  Secondary Diagnosis:	GIB (gastrointestinal bleeding) Principal Discharge DX:	Rectal bleed  Goal:	resolution of symptoms  Instructions for follow-up, activity and diet:	There are 2 common types of GI Bleed, Upper GI Bleed and Lower GI Bleed.  Upper GI Bleed affects the esophagus, stomach, and first part of the small intestine. Lower GI Bleed affects the colon and rectum.  Upper GI Bleed signs and symptoms to notify your Health Care Provider are vomiting blood, or coffee ground vomitus, and bowel movements that look like black tar.  Lower GI Bleed signs and symptoms to notify your health care provider are bright red bloody bowel movements.   Take your medications as prescribed by your Gastroenterologist.  If you have had an Endoscopy or Colonoscopy, follow up with your Gastroenterologist for Pathology results.  Avoid NSAIDs unless your Health Care Provider tells you that it is ok (Aspirin, Ibuprofen, Advil, Motrin, Aleve).  Follow up with your Gastroenterologist within 1-2 weeks of discharge.  Secondary Diagnosis:	Essential hypertension  Instructions for follow-up, activity and diet:	Follow up with your medical doctor to establish long term blood pressure treatment goals.  Secondary Diagnosis:	Anemia  Instructions for follow-up, activity and diet:	follow up with Dr. Lubin regarding iron supplementation

## 2017-07-30 NOTE — PROGRESS NOTE ADULT - SUBJECTIVE AND OBJECTIVE BOX
CHIEF COMPLAINT:Patient is a 72y old  Male who presents with a chief complaint of BLOODY STOOL X3 (30 Jul 2017 13:13)    	  Interval history: tolerating diet, no complaints      Allergies:  penicillin (Pruritus)      PAST MEDICAL & SURGICAL HISTORY:  Heartburn  Coronary artery disease involving native heart with angina pectoris, unspecified vessel or lesion type: Non-obstructive CAD.  Gastrostomy in place: surgical gastrostomy, NOT a PEG  Ischemic bowel disease: s/p exploratory laparotomy and creation of ileostomy 8/2013  Hypothyroid  Seizures: 8/2014 on keppra last EEG - no activity  CVA (cerebral infarction): with RUE weakness  Anoxic brain injury  Aortic aneurysm rupture: with residual Type B dissection  Aortic aneurysm, abdominal  Aortic aneurysm and dissection: type A with rupture 8/2013  Gout  BPH (Benign Prostatic Hyperplasia)  HTN - Hypertension  H/O ischemic bowel disease: s/p resection in 2013  History of tracheostomy: on 8/2013 with decannulation  Ascending aortic dissection: s/p repair on 8/19/2013  S/P ileostomy: exploratory laparotomy, creation of ileostomy and PEG on 8/24/13 due to ischemic bowel      FAMILY HISTORY:  Family history of stroke  Family history of diabetes mellitus type II (Mother)      REVIEW OF SYSTEMS:  CONSTITUTIONAL: No fever, weight loss, or fatigue  EYES: No eye pain, visual disturbances, or discharge  NECK: No pain or stiffness  RESPIRATORY: No cough or wheezing, no shortness of breath  CARDIOVASCULAR: No chest pain, palpitations, dizziness, or leg swelling  GASTROINTESTINAL: No abdominal or epigastric pain. No nausea, vomiting, diarrhea or constipation  GENITOURINARY: No dysuria, urinary frequency or urgency, no hematuria  NEUROLOGICAL: No headaches, memory loss, loss of strength, numbness, or tremors  SKIN: No itching, burning, rashes, or lesions   MUSCULOSKELETAL: No joint pain or swelling; No muscle, back, or extremity pain    Medications:  MEDICATIONS  (STANDING):  finasteride 5 milliGRAM(s) Oral daily  allopurinol 100 milliGRAM(s) Oral two times a day after meals  levothyroxine 50 MICROGram(s) Oral daily  tamsulosin 0.4 milliGRAM(s) Oral at bedtime  pantoprazole Infusion 8 mG/Hr (10 mL/Hr) IV Continuous <Continuous>  sodium chloride 0.9%. 1000 milliLiter(s) (50 mL/Hr) IV Continuous <Continuous>  Nuedexta (Dextromethorphan and Quinidine 1 Capsule(s) 1 Capsule(s) Oral two times a day    MEDICATIONS  (PRN):    	    PHYSICAL EXAM:  T(C): 36.9 (07-30-17 @ 12:19), Max: 37.2 (07-30-17 @ 04:14)  HR: 71 (07-30-17 @ 12:19) (68 - 73)  BP: 144/78 (07-30-17 @ 12:19) (126/76 - 144/78)  RR: 17 (07-30-17 @ 12:19) (17 - 18)  SpO2: 97% (07-30-17 @ 12:19) (94% - 98%)  Wt(kg): --  I&O's Summary    29 Jul 2017 07:01  -  30 Jul 2017 07:00  --------------------------------------------------------  IN: 1600 mL / OUT: 120 mL / NET: 1480 mL    30 Jul 2017 07:01  -  30 Jul 2017 16:39  --------------------------------------------------------  IN: 720 mL / OUT: 0 mL / NET: 720 mL        Appearance: Normal	  HEENT:   NCAT, PERRL, EOMI	  Lymphatic: No lymphadenopathy  Cardiovascular: Normal S1 S2, RRR  Respiratory: Lungs clear to auscultation BL  Psychiatry: A & O x 3, Mood & affect appropriate  Gastrointestinal:  Soft, Non-tender, + BS  Skin: No rashes, No ecchymoses, No cyanosis	  Neurologic: Non-focal  Extremities: Normal range of motion, No clubbing, cyanosis or edema    	  LABS:	 	    CARDIAC MARKERS:                                8.3    5.85  )-----------( 255      ( 30 Jul 2017 08:53 )             26.6     07-30    142  |  105  |  12  ----------------------------<  89  4.2   |  23  |  1.30    Ca    7.7<L>      30 Jul 2017 08:26      proBNP:   Lipid Profile:   HgA1c:   TSH:

## 2017-07-30 NOTE — DISCHARGE NOTE ADULT - CARE PROVIDER_API CALL
Veronica Lawrence), Hematology; Medical Oncology  [address]  [place] [address]  Phone: [phone]  Fax: [phone]    Tamir Can (), Gastroenterology; Internal Medicine  [address]  [place] [address]  Phone: [phone]  Fax: [phone]    Zion Torres), Internal Medicine  [address]  [place] [address]  Phone: [phone]  Fax: [phone]

## 2017-07-30 NOTE — DISCHARGE NOTE ADULT - MEDICATION SUMMARY - MEDICATIONS TO STOP TAKING
I will STOP taking the medications listed below when I get home from the hospital:    aspirin 81 mg oral delayed release tablet  -- 1 tab(s) by mouth once a day

## 2017-07-30 NOTE — DISCHARGE NOTE ADULT - MEDICATION SUMMARY - MEDICATIONS TO TAKE
I will START or STAY ON the medications listed below when I get home from the hospital:    freetext medication  -  -- 1 cap(s) by mouth 2 times a day  -- Indication: For Cva    finasteride 5 mg oral tablet  -- 1 tab(s) by mouth once a day  -- Indication: For Bph    Flomax 0.4 mg oral capsule  -- 1 cap(s) by mouth once a day  -- Indication: For Bph    allopurinol 100 mg oral tablet  -- 1 tab(s) by mouth 2 times a day  -- Indication: For Gout, unspecified cause, unspecified chronicity, unspecified site    ferrous sulfate 325 mg (65 mg elemental iron) oral tablet  -- 1 tab(s) by mouth once a day  -- Indication: For supplement    pantoprazole 40 mg oral delayed release tablet  -- 1 tab(s) by mouth once a day (before a meal)  -- Indication: For Gerd    levothyroxine 50 mcg (0.05 mg) oral tablet  -- 1 tab(s) by mouth once a day  -- Indication: For Hypothyroidism, unspecified type

## 2017-07-30 NOTE — DISCHARGE NOTE ADULT - PLAN OF CARE
resolution of symptoms There are 2 common types of GI Bleed, Upper GI Bleed and Lower GI Bleed.  Upper GI Bleed affects the esophagus, stomach, and first part of the small intestine. Lower GI Bleed affects the colon and rectum.  Upper GI Bleed signs and symptoms to notify your Health Care Provider are vomiting blood, or coffee ground vomitus, and bowel movements that look like black tar.  Lower GI Bleed signs and symptoms to notify your health care provider are bright red bloody bowel movements.   Take your medications as prescribed by your Gastroenterologist.  If you have had an Endoscopy or Colonoscopy, follow up with your Gastroenterologist for Pathology results.  Avoid NSAIDs unless your Health Care Provider tells you that it is ok (Aspirin, Ibuprofen, Advil, Motrin, Aleve).  Follow up with your Gastroenterologist within 1-2 weeks of discharge. Follow up with your medical doctor to establish long term blood pressure treatment goals. follow up with Dr. Lubin regarding iron supplementation

## 2018-04-03 NOTE — PATIENT PROFILE ADULT. - NS PRO TALK SOMEONE YN
I reviewed her thyroid US from 2014  The left sided nodule seems to be stable  Right side nodules : there were 3 sub cm nodules on old [address], there are 2 on new US  Also, there is one nodule that is over 1 cm on old US , this is visualized on new US also.  It no

## 2018-05-08 NOTE — ED ADULT NURSE REASSESSMENT NOTE - TEMPLATE LIST FOR HEAD TO TOE ASSESSMENT
Cardiac Spiral Flap Text: The defect edges were debeveled with a #15 scalpel blade.  Given the location of the defect, shape of the defect and the proximity to free margins a spiral flap was deemed most appropriate.  Using a sterile surgical marker, an appropriate rotation flap was drawn incorporating the defect and placing the expected incisions within the relaxed skin tension lines where possible. The area thus outlined was incised deep to adipose tissue with a #15 scalpel blade.  The skin margins were undermined to an appropriate distance in all directions utilizing iris scissors.

## 2018-06-20 ENCOUNTER — OUTPATIENT (OUTPATIENT)
Dept: OUTPATIENT SERVICES | Facility: HOSPITAL | Age: 73
LOS: 1 days | End: 2018-06-20
Payer: MEDICARE

## 2018-06-20 ENCOUNTER — APPOINTMENT (OUTPATIENT)
Dept: CT IMAGING | Facility: CLINIC | Age: 73
End: 2018-06-20
Payer: MEDICARE

## 2018-06-20 DIAGNOSIS — I71.01 DISSECTION OF THORACIC AORTA: Chronic | ICD-10-CM

## 2018-06-20 DIAGNOSIS — Z93.0 TRACHEOSTOMY STATUS: Chronic | ICD-10-CM

## 2018-06-20 DIAGNOSIS — Z87.19 PERSONAL HISTORY OF OTHER DISEASES OF THE DIGESTIVE SYSTEM: Chronic | ICD-10-CM

## 2018-06-20 DIAGNOSIS — Z00.8 ENCOUNTER FOR OTHER GENERAL EXAMINATION: ICD-10-CM

## 2018-06-20 DIAGNOSIS — Z93.2 ILEOSTOMY STATUS: Chronic | ICD-10-CM

## 2018-06-20 PROCEDURE — 74174 CTA ABD&PLVS W/CONTRAST: CPT | Mod: 26

## 2018-06-20 PROCEDURE — 71275 CT ANGIOGRAPHY CHEST: CPT

## 2018-06-20 PROCEDURE — 82565 ASSAY OF CREATININE: CPT

## 2018-06-20 PROCEDURE — 74174 CTA ABD&PLVS W/CONTRAST: CPT

## 2018-06-20 PROCEDURE — 71275 CT ANGIOGRAPHY CHEST: CPT | Mod: 26

## 2018-06-29 ENCOUNTER — MESSAGE (OUTPATIENT)
Age: 73
End: 2018-06-29

## 2019-06-13 PROBLEM — R12 HEARTBURN: Chronic | Status: ACTIVE | Noted: 2017-07-20

## 2019-06-13 PROBLEM — I25.119 ATHEROSCLEROTIC HEART DISEASE OF NATIVE CORONARY ARTERY WITH UNSPECIFIED ANGINA PECTORIS: Chronic | Status: ACTIVE | Noted: 2017-07-20

## 2019-06-26 ENCOUNTER — APPOINTMENT (OUTPATIENT)
Dept: CT IMAGING | Facility: CLINIC | Age: 74
End: 2019-06-26
Payer: MEDICARE

## 2019-06-26 ENCOUNTER — OUTPATIENT (OUTPATIENT)
Dept: OUTPATIENT SERVICES | Facility: HOSPITAL | Age: 74
LOS: 1 days | End: 2019-06-26
Payer: MEDICARE

## 2019-06-26 DIAGNOSIS — Z87.19 PERSONAL HISTORY OF OTHER DISEASES OF THE DIGESTIVE SYSTEM: Chronic | ICD-10-CM

## 2019-06-26 DIAGNOSIS — Z93.0 TRACHEOSTOMY STATUS: Chronic | ICD-10-CM

## 2019-06-26 DIAGNOSIS — I71.01 DISSECTION OF THORACIC AORTA: Chronic | ICD-10-CM

## 2019-06-26 DIAGNOSIS — I71.00 DISSECTION OF UNSPECIFIED SITE OF AORTA: ICD-10-CM

## 2019-06-26 DIAGNOSIS — Z93.2 ILEOSTOMY STATUS: Chronic | ICD-10-CM

## 2019-06-26 PROCEDURE — 71275 CT ANGIOGRAPHY CHEST: CPT | Mod: 26

## 2019-06-26 PROCEDURE — 71275 CT ANGIOGRAPHY CHEST: CPT

## 2019-06-26 PROCEDURE — 82565 ASSAY OF CREATININE: CPT

## 2019-06-26 PROCEDURE — 74174 CTA ABD&PLVS W/CONTRAST: CPT

## 2019-06-26 PROCEDURE — 74174 CTA ABD&PLVS W/CONTRAST: CPT | Mod: 26

## 2019-07-02 ENCOUNTER — RESULT REVIEW (OUTPATIENT)
Age: 74
End: 2019-07-02

## 2020-02-19 ENCOUNTER — APPOINTMENT (OUTPATIENT)
Dept: CT IMAGING | Facility: CLINIC | Age: 75
End: 2020-02-19
Payer: MEDICARE

## 2020-02-19 ENCOUNTER — OUTPATIENT (OUTPATIENT)
Dept: OUTPATIENT SERVICES | Facility: HOSPITAL | Age: 75
LOS: 1 days | End: 2020-02-19
Payer: MEDICARE

## 2020-02-19 DIAGNOSIS — Z87.19 PERSONAL HISTORY OF OTHER DISEASES OF THE DIGESTIVE SYSTEM: Chronic | ICD-10-CM

## 2020-02-19 DIAGNOSIS — Z98.890 OTHER SPECIFIED POSTPROCEDURAL STATES: ICD-10-CM

## 2020-02-19 DIAGNOSIS — Z93.0 TRACHEOSTOMY STATUS: Chronic | ICD-10-CM

## 2020-02-19 DIAGNOSIS — Z93.2 ILEOSTOMY STATUS: Chronic | ICD-10-CM

## 2020-02-19 DIAGNOSIS — I71.01 DISSECTION OF THORACIC AORTA: Chronic | ICD-10-CM

## 2020-02-19 PROCEDURE — 71275 CT ANGIOGRAPHY CHEST: CPT | Mod: 26

## 2020-02-19 PROCEDURE — 74174 CTA ABD&PLVS W/CONTRAST: CPT | Mod: 26

## 2020-02-19 PROCEDURE — 71275 CT ANGIOGRAPHY CHEST: CPT

## 2020-02-19 PROCEDURE — 74174 CTA ABD&PLVS W/CONTRAST: CPT

## 2020-03-14 DIAGNOSIS — Z86.79 PERSONAL HISTORY OF OTHER DISEASES OF THE CIRCULATORY SYSTEM: ICD-10-CM

## 2021-01-05 ENCOUNTER — APPOINTMENT (OUTPATIENT)
Dept: CT IMAGING | Facility: CLINIC | Age: 76
End: 2021-01-05

## 2021-03-19 NOTE — PATIENT PROFILE ADULT. - FUNCTIONAL LEVEL PRIOR: BATHING
[address]     3/19/21  Keshia Anthony : 1955 Sex: male  Age: 63 y.o. Chief Complaint   Patient presents with    Hypertension    New Patient     re-establish       HPI    Patient presents today as a new patient to establish. I saw him briefly in [address] long ago with blood pressure issues and initiate losartan/hydrochlorthiazide which he has been taking. He shows me numbers from home that are still marginally high in the 1 17-3 50 systolic range. I did check his blood pressure machine against ours today and it was fairly close. I told him I want to bump his medication up from losartan 100/12.5 to losartan 100/25. I did review his blood work with him showing his lipids were poor with triglycerides in the 350 range and LDL cholesterol in the 160 range. He did have a few red cells in his urine. He does follow with urology on a yearly basis and they check his PSA. He did have a TURP through them. Big issue today is that of stress and anxiety. I did talk to him about potentially starting an SSRI but he wants to wait. I told him this could be contributing some to his blood pressure as well. Review of Systems   Constitutional: Negative for activity change, appetite change, chills, diaphoresis, fatigue, fever and unexpected weight change. HENT: Positive for tinnitus. Negative for congestion, ear pain, hearing loss, postnasal drip, rhinorrhea, sinus pain and sore throat. Eyes: Negative. Respiratory: Negative for cough, shortness of breath and wheezing. Cardiovascular: Negative for chest pain, palpitations and leg swelling. Gastrointestinal: Negative for abdominal pain, blood in stool, constipation, diarrhea, nausea and vomiting. Endocrine: Negative. Genitourinary: Negative for difficulty urinating, dysuria, frequency, hematuria and urgency. Musculoskeletal: Negative for arthralgias, back pain, gait problem and myalgias. Skin: Negative. Allergic/Immunologic: Negative for environmental allergies and immunocompromised state. Neurological: Negative for dizziness, weakness, light-headedness, numbness and headaches. Hematological: Negative. Psychiatric/Behavioral: Negative for dysphoric mood and sleep disturbance. The patient is nervous/anxious. REST OF PERTINENT ROS GONE OVER AND WAS NEGATIVE. Current Outpatient Medications:     aspirin 81 MG EC tablet, Take 81 mg by mouth daily, Disp: , Rfl:     losartan-hydroCHLOROthiazide (HYZAAR) 100-25 MG per tablet, Take 1 tablet by mouth daily, Disp: 30 tablet, Rfl: 5  No Known Allergies    Past Medical History:   Diagnosis Date    Essential hypertension     Hypertension      Past Surgical History:   Procedure Laterality Date    TURP       Family History   Problem Relation Age of Onset    No Known Problems Mother     Kidney Disease Father     Other Father         Colitis    Other Other         Patient has 3 brothers and 4 sisters. States one of his brother had hepatitis C. One of the sisters with a stroke another sister with \"blood clots\" all are still living. Social History     Socioeconomic History    Marital status:      Spouse name: Not on file    Number of children: Not on file    Years of education: Not on file    Highest education level: Not on file   Occupational History    Not on file   Social Needs    Financial resource strain: Not on file    Food insecurity     Worry: Not on file     Inability: Not on file    Transportation needs     Medical: Not on file     Non-medical: Not on file   Tobacco Use    Smoking status: Current Some Day Smoker     Types: Cigars    Smokeless tobacco: Never Used   Substance and Sexual Activity    Alcohol use:  Yes     Alcohol/week: 6.0 - 8.0 standard drinks     Types: 6 - 8 Cans of beer per week     Frequency: 2-3 times a week    Drug use: No    Sexual activity: Not on file   Lifestyle    Physical activity     Days per week: Not on file     Minutes per session: Not on file    Stress: Not on file   Relationships    Social connections     Talks on phone: Not on file     Gets together: Not on file     Attends Congregational service: Not on file     Active member of club or organization: Not on file     Attends meetings of clubs or organizations: Not on file     Relationship status: Not on file    Intimate partner violence     Fear of current or ex partner: Not on file     Emotionally abused: Not on file     Physically abused: Not on file     Forced sexual activity: Not on file   Other Topics Concern    Not on file   Social History Narrative    Not on file       Vitals:    03/19/21 0656   BP: (!) 146/92   Pulse: 93   Temp: 97.9 °F (36.6 °C)   TempSrc: Temporal   SpO2: 100%   Weight: 221 lb (100.2 kg)   Height: 6' 1\" (1.854 m)       Physical Exam  Vitals signs and nursing note reviewed. Constitutional:       General: He is not in acute distress. Appearance: He is well-developed. HENT:      Head: Normocephalic and atraumatic. Right Ear: Tympanic membrane, ear canal and external ear normal.      Left Ear: Tympanic membrane, ear canal and external ear normal.      Nose: Nose normal.      Mouth/Throat:      Mouth: Mucous membranes are moist.      Pharynx: Oropharynx is clear. Eyes:      Extraocular Movements: Extraocular movements intact. Pupils: Pupils are equal, round, and reactive to light. Neck:      Musculoskeletal: Normal range of motion and neck supple. No muscular tenderness. Thyroid: No thyromegaly. Cardiovascular:      Rate and Rhythm: Normal rate and regular rhythm. Pulses: Normal pulses. Heart sounds: Normal heart sounds. No murmur. No friction rub. No gallop. Pulmonary:      Effort: Pulmonary effort is normal. No respiratory distress. Breath sounds: Normal breath sounds. No wheezing, rhonchi or rales.    Abdominal:      General: Bowel sounds are normal. There is no distension. Palpations: Abdomen is soft. There is no mass. Tenderness: There is no abdominal tenderness. There is no right CVA tenderness or left CVA tenderness. Musculoskeletal: Normal range of motion. General: No swelling or tenderness. Lymphadenopathy:      Cervical: No cervical adenopathy. Skin:     General: Skin is warm and dry. Findings: No erythema or rash. Neurological:      General: No focal deficit present. Mental Status: He is alert and oriented to person, place, and time. Cranial Nerves: No cranial nerve deficit. Sensory: No sensory deficit. Motor: No weakness or abnormal muscle tone. Coordination: Coordination normal.      Gait: Gait normal.      Deep Tendon Reflexes: Reflexes normal.   Psychiatric:         Mood and Affect: Mood normal.         Behavior: Behavior normal.         Thought Content: Thought content normal.         Judgment: Judgment normal.         Assessment and Plan:  Asaf Moses was seen today for hypertension and new patient. Diagnoses and all orders for this visit:    Routine general medical examination at a health care facility    Essential hypertension  -     Basic Metabolic Panel; Future  -     Urinalysis; Future    Hyperlipidemia, unspecified hyperlipidemia type    Stress    Other orders  -     losartan-hydroCHLOROthiazide (HYZAAR) 100-25 MG per tablet; Take 1 tablet by mouth daily    Plan: I will see him back in 2 months appearing. I did increase his blood pressure medication as noted above. We will check a BMP and urinalysis today. Continue follow-up with his urologist yearly. Continue to monitor blood pressure closely and call in the next couple weeks if numbers are not coming down further. Discuss colonoscopy status when I see him next. I did discuss possible statin medication and SSRI and he has declined at this point. Weight loss attempts. Consider checking hepatitis C with next blood work.   We will check labs in 2 months when I see him again. Try to avoid NSAIDs. Prescription management performed. Notify us with problems in the interim. Return in about 2 months (around 5/19/2021). Seen By:  Pillo Campos MD      *Document was created using voice recognition software. Note was reviewed however may contain grammatical errors. (0) independent

## 2021-06-09 ENCOUNTER — INPATIENT (INPATIENT)
Facility: HOSPITAL | Age: 76
LOS: 3 days | Discharge: HOME | DRG: 392 | End: 2021-06-13
Attending: INTERNAL MEDICINE | Admitting: INTERNAL MEDICINE
Payer: MEDICARE

## 2021-06-09 VITALS
TEMPERATURE: 99 F | RESPIRATION RATE: 18 BRPM | WEIGHT: 160.06 LBS | DIASTOLIC BLOOD PRESSURE: 91 MMHG | SYSTOLIC BLOOD PRESSURE: 171 MMHG | OXYGEN SATURATION: 98 % | HEIGHT: 68 IN | HEART RATE: 86 BPM

## 2021-06-09 DIAGNOSIS — Z93.0 TRACHEOSTOMY STATUS: Chronic | ICD-10-CM

## 2021-06-09 DIAGNOSIS — Z93.2 ILEOSTOMY STATUS: Chronic | ICD-10-CM

## 2021-06-09 DIAGNOSIS — Z87.19 PERSONAL HISTORY OF OTHER DISEASES OF THE DIGESTIVE SYSTEM: Chronic | ICD-10-CM

## 2021-06-09 DIAGNOSIS — I71.01 DISSECTION OF THORACIC AORTA: Chronic | ICD-10-CM

## 2021-06-09 LAB
ALBUMIN SERPL ELPH-MCNC: 3.8 G/DL — SIGNIFICANT CHANGE UP (ref 3.3–5)
ALP SERPL-CCNC: 95 U/L — SIGNIFICANT CHANGE UP (ref 40–120)
ALT FLD-CCNC: 17 U/L — SIGNIFICANT CHANGE UP (ref 10–45)
ANION GAP SERPL CALC-SCNC: 13 MMOL/L — SIGNIFICANT CHANGE UP (ref 5–17)
APTT BLD: 31.3 SEC — SIGNIFICANT CHANGE UP (ref 27.5–35.5)
AST SERPL-CCNC: 25 U/L — SIGNIFICANT CHANGE UP (ref 10–40)
BASOPHILS # BLD AUTO: 0.04 K/UL — SIGNIFICANT CHANGE UP (ref 0–0.2)
BASOPHILS NFR BLD AUTO: 0.7 % — SIGNIFICANT CHANGE UP (ref 0–2)
BILIRUB SERPL-MCNC: 0.3 MG/DL — SIGNIFICANT CHANGE UP (ref 0.2–1.2)
BLD GP AB SCN SERPL QL: NEGATIVE — SIGNIFICANT CHANGE UP
BUN SERPL-MCNC: 17 MG/DL — SIGNIFICANT CHANGE UP (ref 7–23)
CALCIUM SERPL-MCNC: 8.9 MG/DL — SIGNIFICANT CHANGE UP (ref 8.4–10.5)
CHLORIDE SERPL-SCNC: 101 MMOL/L — SIGNIFICANT CHANGE UP (ref 96–108)
CO2 SERPL-SCNC: 22 MMOL/L — SIGNIFICANT CHANGE UP (ref 22–31)
CREAT SERPL-MCNC: 0.96 MG/DL — SIGNIFICANT CHANGE UP (ref 0.5–1.3)
EOSINOPHIL # BLD AUTO: 0.1 K/UL — SIGNIFICANT CHANGE UP (ref 0–0.5)
EOSINOPHIL NFR BLD AUTO: 1.8 % — SIGNIFICANT CHANGE UP (ref 0–6)
GLUCOSE SERPL-MCNC: 98 MG/DL — SIGNIFICANT CHANGE UP (ref 70–99)
HCT VFR BLD CALC: 34 % — LOW (ref 39–50)
HGB BLD-MCNC: 10.5 G/DL — LOW (ref 13–17)
IMM GRANULOCYTES NFR BLD AUTO: 0.4 % — SIGNIFICANT CHANGE UP (ref 0–1.5)
INR BLD: 1.06 RATIO — SIGNIFICANT CHANGE UP (ref 0.88–1.16)
LYMPHOCYTES # BLD AUTO: 1.89 K/UL — SIGNIFICANT CHANGE UP (ref 1–3.3)
LYMPHOCYTES # BLD AUTO: 33.3 % — SIGNIFICANT CHANGE UP (ref 13–44)
MCHC RBC-ENTMCNC: 26.4 PG — LOW (ref 27–34)
MCHC RBC-ENTMCNC: 30.9 GM/DL — LOW (ref 32–36)
MCV RBC AUTO: 85.4 FL — SIGNIFICANT CHANGE UP (ref 80–100)
MONOCYTES # BLD AUTO: 0.67 K/UL — SIGNIFICANT CHANGE UP (ref 0–0.9)
MONOCYTES NFR BLD AUTO: 11.8 % — SIGNIFICANT CHANGE UP (ref 2–14)
NEUTROPHILS # BLD AUTO: 2.96 K/UL — SIGNIFICANT CHANGE UP (ref 1.8–7.4)
NEUTROPHILS NFR BLD AUTO: 52 % — SIGNIFICANT CHANGE UP (ref 43–77)
NRBC # BLD: 0 /100 WBCS — SIGNIFICANT CHANGE UP (ref 0–0)
PLATELET # BLD AUTO: 233 K/UL — SIGNIFICANT CHANGE UP (ref 150–400)
POTASSIUM SERPL-MCNC: 4.7 MMOL/L — SIGNIFICANT CHANGE UP (ref 3.5–5.3)
POTASSIUM SERPL-SCNC: 4.7 MMOL/L — SIGNIFICANT CHANGE UP (ref 3.5–5.3)
PROT SERPL-MCNC: 7.2 G/DL — SIGNIFICANT CHANGE UP (ref 6–8.3)
PROTHROM AB SERPL-ACNC: 12.7 SEC — SIGNIFICANT CHANGE UP (ref 10.6–13.6)
RBC # BLD: 3.98 M/UL — LOW (ref 4.2–5.8)
RBC # FLD: 15.3 % — HIGH (ref 10.3–14.5)
RH IG SCN BLD-IMP: POSITIVE — SIGNIFICANT CHANGE UP
SODIUM SERPL-SCNC: 136 MMOL/L — SIGNIFICANT CHANGE UP (ref 135–145)
WBC # BLD: 5.68 K/UL — SIGNIFICANT CHANGE UP (ref 3.8–10.5)
WBC # FLD AUTO: 5.68 K/UL — SIGNIFICANT CHANGE UP (ref 3.8–10.5)

## 2021-06-09 PROCEDURE — 99285 EMERGENCY DEPT VISIT HI MDM: CPT | Mod: GC

## 2021-06-09 NOTE — ED PROVIDER NOTE - RAPID ASSESSMENT
76y M w/ PMHx of CAD, seizures presents to the ED w/ family c/o bloody stool starting last night. Family describes stool as "blood all over", worsening since this morning. Denies abd pain, rectal pain. Similar incident a couple of years ago, was treated here. Denies PMHx of hemorrhoids, blood thinner use.    Asya BLACK (Scribe) have documented this rapid assessment note under the dictation of Peng Jones (PA) which has been reviewed and affirmed to be accurate. Patient was seen as a QPA patient. The patient will be seen and further worked up in the main emergency department and their care will be completed by the main emergency department team along with a thorough physical exam. Receiving team will follow up on labs, analgesia, any clinical imaging, reassess and disposition as clinically indicated, all decisions regarding the progression of care will be made at their discretion. 76y M w/ PMHx of CAD, TBI, HTN, rectal bleeding s/p AVM treatment,  presents to the ED w/ family c/o bloody stool starting last night. Family describes stool as "blood all over", worsening since this morning. Denies abd pain, rectal pain. Similar incident a couple of years ago, was treated here. Denies PMHx of hemorrhoids, blood thinner use.    Asya BLACK (Milena) have documented this rapid assessment note under the dictation of Peng Jones (PA) which has been reviewed and affirmed to be accurate. Patient was seen as a QPA patient. The patient will be seen and further worked up in the main emergency department and their care will be completed by the main emergency department team along with a thorough physical exam. Receiving team will follow up on labs, analgesia, any clinical imaging, reassess and disposition as clinically indicated, all decisions regarding the progression of care will be made at their discretion.    Letty BLACK PA-C, personally performed the service described in the documentation recorded by the scribe in my presence, and it accurately and completely records my words and actions.

## 2021-06-09 NOTE — ED PROVIDER NOTE - NS ED ROS FT
CONSTITUTIONAL - No fever, No diaphoresis, No weight change  SKIN - No rash  HEMATOLOGIC - No abnormal bleeding or bruising  EYES - No eye pain, No blurred vision  ENT - No change in hearing, No sore throat, No neck pain, No rhinorrhea, No ear pain  RESPIRATORY - No shortness of breath, No cough  CARDIAC -No chest pain, No palpitations  GI +BRBPR   - No dysuria, no frequency, no hematuria.   MUSCULOSKELETAL - No joint pain, No swelling, No back pain  NEUROLOGIC - No numbness, No focal weakness, No headache, No dizziness

## 2021-06-09 NOTE — ED ADULT NURSE NOTE - OBJECTIVE STATEMENT
76 year old male c/o bloody stools. Pt A+Ox4, PMH includes asthma, CAD, CVA, gout, BPH, HTN, and aneurysm. wife reports bloody stools began last night, and worsened today. Pt had x3 episodes of bloody stool today, and has experienced this in the past w/ N/V. Upon assessment, pt presents pale otherwise well-appearing. Pt denies headache, dizziness, chest pain, SOB, N/V, abdominal pain, fevers or chills.

## 2021-06-09 NOTE — ED PROVIDER NOTE - ATTENDING CONTRIBUTION TO CARE
Pt w hx AVM and aortic dissection here with 1 day of BRBPR. pt also w "fatigue" but he is "always fatigued" according to his wife due to chronic anemia. pt denies fever, abd pain, n/v/d, sob, cp. on exam, vitals are stable, abd is soft and non-tender. will obtain CTA a/p, labs, will likely require admission for GIB workup. pt currently stable.

## 2021-06-09 NOTE — ED PROVIDER NOTE - PHYSICAL EXAMINATION
CONSTITUTIONAL: Well-developed; well-nourished; in no acute distress.   SKIN: warm, dry  HEAD: Normocephalic; atraumatic.  EYES: no conjunctival injection.   ENT: No nasal discharge; airway clear.  NECK: Supple; non tender.  CARD: S1, S2 normal; no murmurs, gallops, or rubs.   RESP: No wheezes, rales or rhonchi. Good air movement bilaterally.   ABD: soft ntnd, no guarding, no distention, no rigidity.   EXT: No cyanosis or edema.   NEURO: Alert, oriented, grossly unremarkable  PSYCH: Cooperative, appropriate.

## 2021-06-09 NOTE — ED PROVIDER NOTE - CLINICAL SUMMARY MEDICAL DECISION MAKING FREE TEXT BOX
O'Carolyn DO PGY-1: pt presents due to BRBPR. ordered labs to eval for anemia. ordered imaging. TBA for GI bleeding

## 2021-06-09 NOTE — ED PROVIDER NOTE - CHIEF COMPLAINT
The patient is a 76y Male complaining of  The patient is a 76y Male complaining of bright red blood per rectum

## 2021-06-09 NOTE — ED ADULT NURSE NOTE - NSIMPLEMENTINTERV_GEN_ALL_ED
Implemented All Universal Safety Interventions:  [place] to call system. Call bell, personal items and telephone within reach. Instruct patient to call for assistance. Room bathroom lighting operational. Non-slip footwear when patient is off stretcher. Physically safe environment: no spills, clutter or unnecessary equipment. Stretcher in lowest position, wheels locked, appropriate side rails in place.

## 2021-06-09 NOTE — ED PROVIDER NOTE - OBJECTIVE STATEMENT
76y M w/ PMHx of CAD, TBI, HTN, rectal bleeding s/p AVM treatment,  presents c/o bright red blood per rectum for one day. Pt endorses fatigue. Denies f/c, n/v, cp, sob, abd pain.

## 2021-06-10 DIAGNOSIS — Z29.9 ENCOUNTER FOR PROPHYLACTIC MEASURES, UNSPECIFIED: ICD-10-CM

## 2021-06-10 DIAGNOSIS — I10 ESSENTIAL (PRIMARY) HYPERTENSION: ICD-10-CM

## 2021-06-10 DIAGNOSIS — K92.2 GASTROINTESTINAL HEMORRHAGE, UNSPECIFIED: ICD-10-CM

## 2021-06-10 DIAGNOSIS — D62 ACUTE POSTHEMORRHAGIC ANEMIA: ICD-10-CM

## 2021-06-10 LAB
ANION GAP SERPL CALC-SCNC: 11 MMOL/L — SIGNIFICANT CHANGE UP (ref 5–17)
BUN SERPL-MCNC: 16 MG/DL — SIGNIFICANT CHANGE UP (ref 7–23)
CALCIUM SERPL-MCNC: 9.1 MG/DL — SIGNIFICANT CHANGE UP (ref 8.4–10.5)
CHLORIDE SERPL-SCNC: 104 MMOL/L — SIGNIFICANT CHANGE UP (ref 96–108)
CO2 SERPL-SCNC: 24 MMOL/L — SIGNIFICANT CHANGE UP (ref 22–31)
CREAT SERPL-MCNC: 0.96 MG/DL — SIGNIFICANT CHANGE UP (ref 0.5–1.3)
GLUCOSE SERPL-MCNC: 95 MG/DL — SIGNIFICANT CHANGE UP (ref 70–99)
HCT VFR BLD CALC: 32.6 % — LOW (ref 39–50)
HGB BLD-MCNC: 9.9 G/DL — LOW (ref 13–17)
MCHC RBC-ENTMCNC: 25.7 PG — LOW (ref 27–34)
MCHC RBC-ENTMCNC: 30.4 GM/DL — LOW (ref 32–36)
MCV RBC AUTO: 84.7 FL — SIGNIFICANT CHANGE UP (ref 80–100)
NRBC # BLD: 0 /100 WBCS — SIGNIFICANT CHANGE UP (ref 0–0)
PLATELET # BLD AUTO: 207 K/UL — SIGNIFICANT CHANGE UP (ref 150–400)
POTASSIUM SERPL-MCNC: 3.9 MMOL/L — SIGNIFICANT CHANGE UP (ref 3.5–5.3)
POTASSIUM SERPL-SCNC: 3.9 MMOL/L — SIGNIFICANT CHANGE UP (ref 3.5–5.3)
RBC # BLD: 3.85 M/UL — LOW (ref 4.2–5.8)
RBC # FLD: 15.4 % — HIGH (ref 10.3–14.5)
SARS-COV-2 RNA SPEC QL NAA+PROBE: SIGNIFICANT CHANGE UP
SODIUM SERPL-SCNC: 139 MMOL/L — SIGNIFICANT CHANGE UP (ref 135–145)
WBC # BLD: 4.21 K/UL — SIGNIFICANT CHANGE UP (ref 3.8–10.5)
WBC # FLD AUTO: 4.21 K/UL — SIGNIFICANT CHANGE UP (ref 3.8–10.5)

## 2021-06-10 PROCEDURE — 99223 1ST HOSP IP/OBS HIGH 75: CPT

## 2021-06-10 PROCEDURE — G1004: CPT

## 2021-06-10 PROCEDURE — 74177 CT ABD & PELVIS W/CONTRAST: CPT | Mod: 26,MG

## 2021-06-10 RX ORDER — ALBUTEROL 90 UG/1
2 AEROSOL, METERED ORAL EVERY 6 HOURS
Refills: 0 | Status: DISCONTINUED | OUTPATIENT
Start: 2021-06-10 | End: 2021-06-13

## 2021-06-10 RX ORDER — TAMSULOSIN HYDROCHLORIDE 0.4 MG/1
0.4 CAPSULE ORAL AT BEDTIME
Refills: 0 | Status: DISCONTINUED | OUTPATIENT
Start: 2021-06-10 | End: 2021-06-13

## 2021-06-10 RX ORDER — SOD SULF/SODIUM/NAHCO3/KCL/PEG
1 SOLUTION, RECONSTITUTED, ORAL ORAL ONCE
Refills: 0 | Status: COMPLETED | OUTPATIENT
Start: 2021-06-10 | End: 2021-06-11

## 2021-06-10 RX ORDER — MONTELUKAST 4 MG/1
1 TABLET, CHEWABLE ORAL
Qty: 0 | Refills: 0 | DISCHARGE

## 2021-06-10 RX ORDER — PANTOPRAZOLE SODIUM 20 MG/1
1 TABLET, DELAYED RELEASE ORAL
Qty: 0 | Refills: 0 | DISCHARGE

## 2021-06-10 RX ORDER — FINASTERIDE 5 MG/1
5 TABLET, FILM COATED ORAL DAILY
Refills: 0 | Status: DISCONTINUED | OUTPATIENT
Start: 2021-06-10 | End: 2021-06-13

## 2021-06-10 RX ORDER — ALLOPURINOL 300 MG
1 TABLET ORAL
Qty: 0 | Refills: 0 | DISCHARGE

## 2021-06-10 RX ORDER — SOD SULF/SODIUM/NAHCO3/KCL/PEG
1 SOLUTION, RECONSTITUTED, ORAL ORAL ONCE
Refills: 0 | Status: COMPLETED | OUTPATIENT
Start: 2021-06-10 | End: 2021-06-10

## 2021-06-10 RX ORDER — LEVOTHYROXINE SODIUM 125 MCG
50 TABLET ORAL DAILY
Refills: 0 | Status: DISCONTINUED | OUTPATIENT
Start: 2021-06-10 | End: 2021-06-13

## 2021-06-10 RX ORDER — LEVOTHYROXINE SODIUM 125 MCG
1 TABLET ORAL
Qty: 0 | Refills: 0 | DISCHARGE

## 2021-06-10 RX ORDER — MONTELUKAST 4 MG/1
10 TABLET, CHEWABLE ORAL DAILY
Refills: 0 | Status: DISCONTINUED | OUTPATIENT
Start: 2021-06-10 | End: 2021-06-13

## 2021-06-10 RX ORDER — FERROUS SULFATE 325(65) MG
1 TABLET ORAL
Qty: 0 | Refills: 0 | DISCHARGE

## 2021-06-10 RX ORDER — PANTOPRAZOLE SODIUM 20 MG/1
40 TABLET, DELAYED RELEASE ORAL
Refills: 0 | Status: DISCONTINUED | OUTPATIENT
Start: 2021-06-10 | End: 2021-06-13

## 2021-06-10 RX ORDER — TAMSULOSIN HYDROCHLORIDE 0.4 MG/1
1 CAPSULE ORAL
Qty: 0 | Refills: 0 | DISCHARGE

## 2021-06-10 RX ORDER — SODIUM CHLORIDE 9 MG/ML
1000 INJECTION, SOLUTION INTRAVENOUS
Refills: 0 | Status: DISCONTINUED | OUTPATIENT
Start: 2021-06-10 | End: 2021-06-12

## 2021-06-10 RX ORDER — ALLOPURINOL 300 MG
100 TABLET ORAL
Refills: 0 | Status: DISCONTINUED | OUTPATIENT
Start: 2021-06-10 | End: 2021-06-13

## 2021-06-10 RX ORDER — METOPROLOL TARTRATE 50 MG
1 TABLET ORAL
Qty: 0 | Refills: 0 | DISCHARGE

## 2021-06-10 RX ORDER — ALBUTEROL 90 UG/1
2 AEROSOL, METERED ORAL
Qty: 0 | Refills: 0 | DISCHARGE

## 2021-06-10 RX ORDER — FINASTERIDE 5 MG/1
1 TABLET, FILM COATED ORAL
Qty: 0 | Refills: 0 | DISCHARGE

## 2021-06-10 RX ORDER — ACETAMINOPHEN 500 MG
650 TABLET ORAL ONCE
Refills: 0 | Status: COMPLETED | OUTPATIENT
Start: 2021-06-10 | End: 2021-06-10

## 2021-06-10 RX ADMIN — MONTELUKAST 10 MILLIGRAM(S): 4 TABLET, CHEWABLE ORAL at 11:42

## 2021-06-10 RX ADMIN — Medication 1 LITER(S): at 21:11

## 2021-06-10 RX ADMIN — FINASTERIDE 5 MILLIGRAM(S): 5 TABLET, FILM COATED ORAL at 11:42

## 2021-06-10 RX ADMIN — PANTOPRAZOLE SODIUM 40 MILLIGRAM(S): 20 TABLET, DELAYED RELEASE ORAL at 05:24

## 2021-06-10 RX ADMIN — PANTOPRAZOLE SODIUM 40 MILLIGRAM(S): 20 TABLET, DELAYED RELEASE ORAL at 17:11

## 2021-06-10 RX ADMIN — Medication 50 MICROGRAM(S): at 05:24

## 2021-06-10 RX ADMIN — Medication 650 MILLIGRAM(S): at 15:54

## 2021-06-10 RX ADMIN — SODIUM CHLORIDE 70 MILLILITER(S): 9 INJECTION, SOLUTION INTRAVENOUS at 14:54

## 2021-06-10 RX ADMIN — Medication 650 MILLIGRAM(S): at 16:24

## 2021-06-10 RX ADMIN — Medication 100 MILLIGRAM(S): at 05:24

## 2021-06-10 RX ADMIN — Medication 100 MILLIGRAM(S): at 17:11

## 2021-06-10 RX ADMIN — TAMSULOSIN HYDROCHLORIDE 0.4 MILLIGRAM(S): 0.4 CAPSULE ORAL at 21:10

## 2021-06-10 NOTE — H&P ADULT - NSHPPHYSICALEXAM_GEN_ALL_CORE
Vital Signs Last 24 Hrs  T(C): 36.5 (10 William 2021 03:25), Max: 37.1 (09 Jun 2021 21:34)  T(F): 97.7 (10 William 2021 03:25), Max: 98.8 (09 Jun 2021 21:34)  HR: 68 (10 William 2021 03:25) (65 - 86)  BP: 131/73 (10 William 2021 03:25) (127/70 - 171/91)  RR: 18 (10 William 2021 03:25) (18 - 20)  SpO2: 97% (10 William 2021 03:25) (97% - 98%) on RA    GENERAL: NAD, well-developed  HEAD:  Atraumatic, Normocephalic  EYES: EOMI, PERRL, conjunctiva and sclera clear  Mouth: MMM, no lesions  NECK: Supple, no appreciable masses  Lung: normal work of breathing, cta b/l  Chest: S1&S2+, rrr, no m/r/g appreciated  ABDOMEN: bs+, soft, nt, nd, no appreciable masses  : No hall catheter, no CVA tenderness  EXTREMITIES:  radial pulse present b/l, PT present b/l, no pitting edema present  Neuro: A&Ox3, weakness in RUE  SKIN: warm and dry, no visible purulence in exposed areas

## 2021-06-10 NOTE — H&P ADULT - HISTORY OF PRESENT ILLNESS
76M w/ hx of duodenal ulcer/AVM, hx of ileostomy s/p reversal, hx of Type B aortic dissection, hx of CVA w/ RUE weakness, HTN, gout p/w blood per rectum. The patient reportedly has experienced 3 episodes of bleeding per rectum on day of presentation without abdominal pain, diarrhea, fever, n/v. Additionally no reported CP, palpitations, sob, lightheadedness. No reported hx of colon cancer (personal or family). He has had bleeding per rectum in 2017 which was attributed to duodenal AVM. Bleeding has since resolved since presenting to the hospital. Denies aspirin or blood thinner. Denies easy bruising of gingival hemorrhage. Denies black sticky stool.    In the ED, VS 98.8, 171/91, 86, 18 98%RA

## 2021-06-10 NOTE — CONSULT NOTE ADULT - PROBLEM SELECTOR RECOMMENDATION 9
differential Diverticulosis vs AVM  may star w clear liquid diet   monitor CBC daily  agree w pantoprazole   will schedule EGD/Colon in AM  will follow with you.

## 2021-06-10 NOTE — H&P ADULT - NSHPLABSRESULTS_GEN_ALL_CORE
Personally reviewed available labs, imaging and ekg  CBC Full  -  ( 09 Jun 2021 22:05 )  WBC Count : 5.68 K/uL  RBC Count : 3.98 M/uL  Hemoglobin : 10.5 g/dL  Hematocrit : 34.0 %  Platelet Count - Automated : 233 K/uL  Mean Cell Volume : 85.4 fl  Mean Cell Hemoglobin : 26.4 pg  Mean Cell Hemoglobin Concentration : 30.9 gm/dL  Auto Neutrophil # : 2.96 K/uL  Auto Lymphocyte # : 1.89 K/uL  Auto Monocyte # : 0.67 K/uL  Auto Eosinophil # : 0.10 K/uL  Auto Basophil # : 0.04 K/uL  Auto Neutrophil % : 52.0 %  Auto Lymphocyte % : 33.3 %  Auto Monocyte % : 11.8 %  Auto Eosinophil % : 1.8 %  Auto Basophil % : 0.7 %  06-09  136  |  101  |  17  ----------------------------<  98  4.7   |  22  |  0.96  Ca    8.9      09 Jun 2021 22:05  TPro  7.2  /  Alb  3.8  /  TBili  0.3  /  DBili  x   /  AST  25  /  ALT  17  /  AlkPhos  95  06-09  PT/INR - ( 09 Jun 2021 22:05 )   PT: 12.7 sec;   INR: 1.06 ratio    PTT - ( 09 Jun 2021 22:05 )  PTT:31.3 sec  Imaging: CT abdomen does not demonstrate bowel obstruction  EKG: ordered

## 2021-06-10 NOTE — H&P ADULT - ASSESSMENT
76M w/ hx of duodenal ulcer/AVM, hx of ileostomy s/p reversal, hx of Type B aortic dissection, hx of CVA w/ RUE weakness, HTN, gout p/w blood per rectum.

## 2021-06-10 NOTE — H&P ADULT - NSICDXPASTMEDICALHX_GEN_ALL_CORE_FT
PAST MEDICAL HISTORY:  Anoxic brain injury     Aortic aneurysm and dissection type A with rupture 8/2013    Aortic aneurysm rupture with residual Type B dissection    Aortic aneurysm, abdominal     BPH (Benign Prostatic Hyperplasia)     Coronary artery disease involving native heart with angina pectoris, unspecified vessel or lesion type Non-obstructive CAD.    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

## 2021-06-10 NOTE — H&P ADULT - PROBLEM SELECTOR PLAN 1
- NPO, GI consult in the am  - monitor CBC in am  - transfuse if Hb<7 or if there is hemodynamic instability  - c/w home PPI

## 2021-06-10 NOTE — CONSULT NOTE ADULT - SUBJECTIVE AND OBJECTIVE BOX
Chief Complaint:  Patient is a 76y old  Male who presents with a chief complaint of 76M p/w blood per rectum (10 William 2021 03:42)      HPI:    Allergies:  penicillin (Pruritus)      Medications:  ALBUTerol    90 MICROgram(s) HFA Inhaler 2 Puff(s) Inhalation every 6 hours PRN  allopurinol 100 milliGRAM(s) Oral two times a day  dextrose 5% + sodium chloride 0.9%. 1000 milliLiter(s) IV Continuous <Continuous>  finasteride 5 milliGRAM(s) Oral daily  levothyroxine 50 MICROGram(s) Oral daily  montelukast 10 milliGRAM(s) Oral daily  pantoprazole    Tablet 40 milliGRAM(s) Oral two times a day  tamsulosin 0.4 milliGRAM(s) Oral at bedtime      PMHX/PSHX:  HTN - Hypertension    BPH (Benign Prostatic Hyperplasia)    Gout    Aortic aneurysm and dissection    Aortic aneurysm, abdominal    Aortic aneurysm rupture    Anoxic brain injury    CVA (cerebral infarction)    Seizures    Hypothyroid    Ischemic bowel disease    PEG (percutaneous endoscopic gastrostomy) status    Gastrostomy in place    Coronary artery disease involving native heart with angina pectoris, unspecified vessel or lesion type    Heartburn    S/P ileostomy    Ascending aortic dissection    History of tracheostomy    H/O ischemic bowel disease        Family history:  Family history of diabetes mellitus type II (Mother)    Family history of stroke        Social History:     ROS:     General:  No wt loss, fevers, chills, night sweats, fatigue,   Eyes:  Good vision, no reported pain  ENT:  No sore throat, pain, runny nose, dysphagia  CV:  No pain, palpitations, hypo/hypertension  Resp:  No dyspnea, cough, tachypnea, wheezing  GI:  No pain, No nausea, No vomiting, No diarrhea, No constipation, No weight loss, No fever, No pruritis, No rectal bleeding, No tarry stools, No dysphagia,  :  No pain, bleeding, incontinence, nocturia  Muscle:  No pain, weakness  Neuro:  No weakness, tingling, memory problems  Psych:  No fatigue, insomnia, mood problems, depression  Endocrine:  No polyuria, polydipsia, cold/heat intolerance  Heme:  No petechiae, ecchymosis, easy bruisability  Skin:  No rash, tattoos, scars, edema      PHYSICAL EXAM:   Vital Signs:  Vital Signs Last 24 Hrs  T(C): 36.7 (10 William 2021 08:22), Max: 37.1 (09 Jun 2021 21:34)  T(F): 98.1 (10 William 2021 08:22), Max: 98.8 (09 Jun 2021 21:34)  HR: 66 (10 William 2021 08:22) (65 - 86)  BP: 134/78 (10 William 2021 08:22) (127/70 - 171/91)  BP(mean): --  RR: 18 (10 William 2021 08:22) (18 - 20)  SpO2: 95% (10 William 2021 08:22) (95% - 98%)  Daily Height in cm: 172.72 (09 Jun 2021 21:34)    Daily     GENERAL:  Appears stated age, well-groomed, well-nourished, no distress  HEENT:  NC/AT,  conjunctivae clear and pink, no thyromegaly, nodules, adenopathy, no JVD, sclera -anicteric  CHEST:  Full & symmetric excursion, no increased effort, breath sounds clear  HEART:  Regular rhythm, S1, S2, no murmur/rub/S3/S4, no abdominal bruit, no edema  ABDOMEN:  Soft, non-tender, non-distended, normoactive bowel sounds,  no masses ,no hepato-splenomegaly, no signs of chronic liver disease  EXTEREMITIES:  no cyanosis,clubbing or edema  SKIN:  No rash/erythema/ecchymoses/petechiae/wounds/abscess/warm/dry  NEURO:  Alert, oriented, no asterixis, no tremor, no encephalopathy    LABS:                        9.9    4.21  )-----------( 207      ( 10 William 2021 05:44 )             32.6     06-10    139  |  104  |  16  ----------------------------<  95  3.9   |  24  |  0.96    Ca    9.1      10 William 2021 05:44    TPro  7.2  /  Alb  3.8  /  TBili  0.3  /  DBili  x   /  AST  25  /  ALT  17  /  AlkPhos  95  06-09    LIVER FUNCTIONS - ( 09 Jun 2021 22:05 )  Alb: 3.8 g/dL / Pro: 7.2 g/dL / ALK PHOS: 95 U/L / ALT: 17 U/L / AST: 25 U/L / GGT: x           PT/INR - ( 09 Jun 2021 22:05 )   PT: 12.7 sec;   INR: 1.06 ratio         PTT - ( 09 Jun 2021 22:05 )  PTT:31.3 sec        Imaging:                M w/ hx of duodenal ulcer/AVM, hx of ileostomy s/p reversal, hx of Type B aortic dissection, hx of CVA w/ RUE weakness, HTN, gout p/w blood per rectum. The patient reportedly has experienced 3 episodes of bleeding per rectum on day of presentation without abdominal pain, diarrhea, fever, n/v. Additionally no reported CP, palpitations, sob, lightheadedness. No reported hx of colon cancer (personal or family). He has had bleeding per rectum in 2017 which was attributed to duodenal AVM. Bleeding has since resolved since presenting to the hospital. Denies aspirin or blood thinner. Denies easy bruising of gingival hemorrhage. Denies black sticky stool.       Chief Complaint:  Patient is a 76y old  Male who presents with a chief complaint of 76M p/w blood per rectum (10 William 2021 03:42)      HPI:   This is a 76 yr o male w hx of AVM in duodenum, hx of ileostomy s/p reversal, hx of Type B aortic dissection, hx of CVA w/ RUE weakness, HTN, gout p/w blood per rectum. The patient reportedly has experienced 3 episodes of bleeding per rectum on day of presentation without abdominal pain, diarrhea, fever, n/v. Additionally no reported CP, palpitations, sob, lightheadedness. No reported hx of colon cancer (personal or family). He has had bleeding per rectum in 2017 which was attributed to duodenal AVM. Bleeding has since resolved since presenting to the hospital. Denies aspirin or blood thinner. Denies easy bruising of gingival hemorrhage. Denies black sticky stool. Currently he is resting comfortable wife is by his bedside.        Allergies:  penicillin (Pruritus)      Medications:  ALBUTerol    90 MICROgram(s) HFA Inhaler 2 Puff(s) Inhalation every 6 hours PRN  allopurinol 100 milliGRAM(s) Oral two times a day  dextrose 5% + sodium chloride 0.9%. 1000 milliLiter(s) IV Continuous <Continuous>  finasteride 5 milliGRAM(s) Oral daily  levothyroxine 50 MICROGram(s) Oral daily  montelukast 10 milliGRAM(s) Oral daily  pantoprazole    Tablet 40 milliGRAM(s) Oral two times a day  tamsulosin 0.4 milliGRAM(s) Oral at bedtime      PMHX/PSHX:  HTN - Hypertension    BPH (Benign Prostatic Hyperplasia)    Gout    Aortic aneurysm and dissection    Aortic aneurysm, abdominal    Aortic aneurysm rupture    Anoxic brain injury    CVA (cerebral infarction)    Seizures    Hypothyroid    Ischemic bowel disease    Coronary artery disease involving native heart with angina pectoris, unspecified vessel or lesion type    Heartburn    S/P ileostomy    Ascending aortic dissection    History of tracheostomy    H/O ischemic bowel disease        Family history:  Family history of diabetes mellitus type II (Mother)    Family history of stroke        Social History:     ROS:     General:  No wt loss, fevers, chills, night sweats, fatigue,   Eyes:  Good vision, no reported pain  ENT:  No sore throat, pain, runny nose, dysphagia  CV:  No pain, palpitations, hypo/hypertension  Resp:  No dyspnea, cough, tachypnea, wheezing  GI:  No pain, No nausea, No vomiting, No diarrhea, No constipation, No weight loss, No fever, No pruritis,   :  No pain, bleeding, incontinence, nocturia  Muscle:  No pain, weakness  Endocrine:  No polyuria, polydipsia, cold/heat intolerance  Heme:  No petechiae, ecchymosis, easy bruisability  Skin:  No rash, tattoos, scars, edema      PHYSICAL EXAM:   Vital Signs:  Vital Signs Last 24 Hrs  T(C): 36.7 (10 William 2021 08:22), Max: 37.1 (09 Jun 2021 21:34)  T(F): 98.1 (10 William 2021 08:22), Max: 98.8 (09 Jun 2021 21:34)  HR: 66 (10 William 2021 08:22) (65 - 86)  BP: 134/78 (10 William 2021 08:22) (127/70 - 171/91)  BP(mean): --  RR: 18 (10 William 2021 08:22) (18 - 20)  SpO2: 95% (10 William 2021 08:22) (95% - 98%)  Daily Height in cm: 172.72 (09 Jun 2021 21:34)    Daily     GENERAL:  patient in no distress  HEENT:  NC/AT,  conjunctivae clear and pink,   CHEST:  breath sounds clear  HEART:  Regular rhythm, S1, S2,   ABDOMEN:  Soft, non-tender, non-distended, ventral hernia, BS present  EXTEREMITIES:  edema  right arm with limited motion  NEURO:  Alert, oriented,     LABS:                        9.9    4.21  )-----------( 207      ( 10 William 2021 05:44 )             32.6     06-10    139  |  104  |  16  ----------------------------<  95  3.9   |  24  |  0.96    Ca    9.1      10 William 2021 05:44    TPro  7.2  /  Alb  3.8  /  TBili  0.3  /  DBili  x   /  AST  25  /  ALT  17  /  AlkPhos  95  06-09    LIVER FUNCTIONS - ( 09 Jun 2021 22:05 )  Alb: 3.8 g/dL / Pro: 7.2 g/dL / ALK PHOS: 95 U/L / ALT: 17 U/L / AST: 25 U/L / GGT: x           PT/INR - ( 09 Jun 2021 22:05 )   PT: 12.7 sec;   INR: 1.06 ratio         PTT - ( 09 Jun 2021 22:05 )  PTT:31.3 sec        Imaging:

## 2021-06-10 NOTE — H&P ADULT - NSHPADDITIONALINFOADULT_GEN_ALL_CORE
Patient assigned to me by night hospitalist in charge for management and care for patient for this evening only. Care to be resumed by day hospitalist Dr. Keith at 08:00 in the morning and thereafter.     Melo Gallego MD  Medicine Attending  Department of Hospital Medicine  pager: [phone] (available from 20:00 to 08:00)

## 2021-06-10 NOTE — ED ADULT NURSE REASSESSMENT NOTE - NS ED NURSE REASSESS COMMENT FT1
Pt and family aware of plan of care, admitted to medicine for GI bleed. Report given to LUIS DANIEL Gunderson in ED Holding.

## 2021-06-10 NOTE — H&P ADULT - NSHPREVIEWOFSYSTEMS_GEN_ALL_CORE
CONSTITUTIONAL: No fever, no chills  EYES: No eye pain, no acute blindness  Mouth: no pain in mouth, no cuts  RESPIRATORY: No cough, No sob  CARDIOVASCULAR: No CP, no palpitations  GASTROINTESTINAL: no abdominal pain, blood per rectum+  GENITOURINARY: No dysuria, no hematuria  Heme: No easy bruising, no swelling of neck  NEUROLOGICAL: No seizure, No acute paralysis  SKIN: No itching, no rashes  MUSCULOSKELETAL: No acute joint pain, no joint swelling

## 2021-06-10 NOTE — H&P ADULT - NSICDXPASTSURGICALHX_GEN_ALL_CORE_FT
PAST SURGICAL HISTORY:  Ascending aortic dissection s/p repair on 8/19/2013    H/O ischemic bowel disease s/p resection in 2013    History of tracheostomy on 8/2013 with decannulation    S/P ileostomy exploratory laparotomy, creation of ileostomy and PEG on 8/24/13 due to ischemic bowel

## 2021-06-10 NOTE — H&P ADULT - NSICDXFAMILYHX_GEN_ALL_CORE_FT
FAMILY HISTORY:  Family history of stroke    Mother  Still living? Unknown  Family history of diabetes mellitus type II, Age at diagnosis: Age Unknown

## 2021-06-11 ENCOUNTER — RESULT REVIEW (OUTPATIENT)
Age: 76
End: 2021-06-11

## 2021-06-11 LAB
COVID-19 SPIKE DOMAIN AB INTERP: POSITIVE
COVID-19 SPIKE DOMAIN ANTIBODY RESULT: >250 U/ML — HIGH
HCT VFR BLD CALC: 37.7 % — LOW (ref 39–50)
HGB BLD-MCNC: 11.6 G/DL — LOW (ref 13–17)
MCHC RBC-ENTMCNC: 26.1 PG — LOW (ref 27–34)
MCHC RBC-ENTMCNC: 30.8 GM/DL — LOW (ref 32–36)
MCV RBC AUTO: 84.7 FL — SIGNIFICANT CHANGE UP (ref 80–100)
NRBC # BLD: 0 /100 WBCS — SIGNIFICANT CHANGE UP (ref 0–0)
PLATELET # BLD AUTO: 260 K/UL — SIGNIFICANT CHANGE UP (ref 150–400)
RBC # BLD: 4.45 M/UL — SIGNIFICANT CHANGE UP (ref 4.2–5.8)
RBC # FLD: 15.5 % — HIGH (ref 10.3–14.5)
SARS-COV-2 IGG+IGM SERPL QL IA: >250 U/ML — HIGH
SARS-COV-2 IGG+IGM SERPL QL IA: POSITIVE
WBC # BLD: 5.44 K/UL — SIGNIFICANT CHANGE UP (ref 3.8–10.5)
WBC # FLD AUTO: 5.44 K/UL — SIGNIFICANT CHANGE UP (ref 3.8–10.5)

## 2021-06-11 PROCEDURE — 88305 TISSUE EXAM BY PATHOLOGIST: CPT | Mod: 26

## 2021-06-11 RX ADMIN — MONTELUKAST 10 MILLIGRAM(S): 4 TABLET, CHEWABLE ORAL at 13:32

## 2021-06-11 RX ADMIN — Medication 100 MILLIGRAM(S): at 05:23

## 2021-06-11 RX ADMIN — PANTOPRAZOLE SODIUM 40 MILLIGRAM(S): 20 TABLET, DELAYED RELEASE ORAL at 05:20

## 2021-06-11 RX ADMIN — Medication 50 MICROGRAM(S): at 05:20

## 2021-06-11 RX ADMIN — Medication 1 LITER(S): at 05:19

## 2021-06-11 RX ADMIN — TAMSULOSIN HYDROCHLORIDE 0.4 MILLIGRAM(S): 0.4 CAPSULE ORAL at 21:39

## 2021-06-11 RX ADMIN — PANTOPRAZOLE SODIUM 40 MILLIGRAM(S): 20 TABLET, DELAYED RELEASE ORAL at 21:39

## 2021-06-11 RX ADMIN — FINASTERIDE 5 MILLIGRAM(S): 5 TABLET, FILM COATED ORAL at 13:32

## 2021-06-11 RX ADMIN — SODIUM CHLORIDE 70 MILLILITER(S): 9 INJECTION, SOLUTION INTRAVENOUS at 09:39

## 2021-06-11 RX ADMIN — Medication 100 MILLIGRAM(S): at 21:39

## 2021-06-11 NOTE — PRE-ANESTHESIA EVALUATION ADULT - NSANTHOSAYNRD_GEN_A_CORE
No. BERNABE screening performed.  STOP BANG Legend: 0-2 = LOW Risk; 3-4 = INTERMEDIATE Risk; 5-8 = HIGH Risk

## 2021-06-11 NOTE — PRE-ANESTHESIA EVALUATION ADULT - HEIGHT IN CM
----- Message from Lilly Peacock RN sent at 8/28/2020  8:30 AM CDT -----    ----- Message -----  From: Soraya Andrews MD  Sent: 8/28/2020   8:28 AM CDT  To: EPI Andrews  Nurse Msg Pool    Dr Andrews has reviewed labs and Will d/w pt at upcoming appt      
Relayed result message to patient.  He confirmed understanding.   
172.72

## 2021-06-12 ENCOUNTER — TRANSCRIPTION ENCOUNTER (OUTPATIENT)
Age: 76
End: 2021-06-12

## 2021-06-12 LAB
ANION GAP SERPL CALC-SCNC: 13 MMOL/L — SIGNIFICANT CHANGE UP (ref 5–17)
BUN SERPL-MCNC: 10 MG/DL — SIGNIFICANT CHANGE UP (ref 7–23)
CALCIUM SERPL-MCNC: 8.5 MG/DL — SIGNIFICANT CHANGE UP (ref 8.4–10.5)
CHLORIDE SERPL-SCNC: 108 MMOL/L — SIGNIFICANT CHANGE UP (ref 96–108)
CO2 SERPL-SCNC: 18 MMOL/L — LOW (ref 22–31)
CREAT SERPL-MCNC: 1.06 MG/DL — SIGNIFICANT CHANGE UP (ref 0.5–1.3)
GLUCOSE SERPL-MCNC: 102 MG/DL — HIGH (ref 70–99)
HCT VFR BLD CALC: 32.2 % — LOW (ref 39–50)
HGB BLD-MCNC: 9.5 G/DL — LOW (ref 13–17)
MCHC RBC-ENTMCNC: 27 PG — SIGNIFICANT CHANGE UP (ref 27–34)
MCHC RBC-ENTMCNC: 29.5 GM/DL — LOW (ref 32–36)
MCV RBC AUTO: 91.5 FL — SIGNIFICANT CHANGE UP (ref 80–100)
NRBC # BLD: 0 /100 WBCS — SIGNIFICANT CHANGE UP (ref 0–0)
PLATELET # BLD AUTO: 138 K/UL — LOW (ref 150–400)
POTASSIUM SERPL-MCNC: 3.9 MMOL/L — SIGNIFICANT CHANGE UP (ref 3.5–5.3)
POTASSIUM SERPL-SCNC: 3.9 MMOL/L — SIGNIFICANT CHANGE UP (ref 3.5–5.3)
RBC # BLD: 3.52 M/UL — LOW (ref 4.2–5.8)
RBC # FLD: 15.8 % — HIGH (ref 10.3–14.5)
SODIUM SERPL-SCNC: 139 MMOL/L — SIGNIFICANT CHANGE UP (ref 135–145)
WBC # BLD: 6.59 K/UL — SIGNIFICANT CHANGE UP (ref 3.8–10.5)
WBC # FLD AUTO: 6.59 K/UL — SIGNIFICANT CHANGE UP (ref 3.8–10.5)

## 2021-06-12 RX ORDER — OMEGA-3 ACID ETHYL ESTERS 1 G
0 CAPSULE ORAL
Qty: 0 | Refills: 0 | DISCHARGE

## 2021-06-12 RX ORDER — CHOLECALCIFEROL (VITAMIN D3) 125 MCG
1000 CAPSULE ORAL
Qty: 0 | Refills: 0 | DISCHARGE

## 2021-06-12 RX ORDER — SUCRALFATE 1 G
10 TABLET ORAL
Qty: 0 | Refills: 0 | DISCHARGE
Start: 2021-06-12

## 2021-06-12 RX ORDER — SUCRALFATE 1 G
1 TABLET ORAL
Refills: 0 | Status: DISCONTINUED | OUTPATIENT
Start: 2021-06-12 | End: 2021-06-12

## 2021-06-12 RX ORDER — SUCRALFATE 1 G
10 TABLET ORAL
Qty: 600 | Refills: 0
Start: 2021-06-12 | End: 2021-06-26

## 2021-06-12 RX ORDER — CHOLECALCIFEROL (VITAMIN D3) 125 MCG
0 CAPSULE ORAL
Qty: 0 | Refills: 0 | DISCHARGE

## 2021-06-12 RX ORDER — SUCRALFATE 1 G
1 TABLET ORAL
Refills: 0 | Status: DISCONTINUED | OUTPATIENT
Start: 2021-06-12 | End: 2021-06-13

## 2021-06-12 RX ORDER — FERROUS SULFATE 325(65) MG
0 TABLET ORAL
Qty: 0 | Refills: 0 | DISCHARGE

## 2021-06-12 RX ORDER — FERROUS SULFATE 325(65) MG
1 TABLET ORAL
Qty: 0 | Refills: 0 | DISCHARGE

## 2021-06-12 RX ADMIN — Medication 1 GRAM(S): at 11:15

## 2021-06-12 RX ADMIN — Medication 1 GRAM(S): at 23:09

## 2021-06-12 RX ADMIN — Medication 100 MILLIGRAM(S): at 17:22

## 2021-06-12 RX ADMIN — Medication 1 GRAM(S): at 17:22

## 2021-06-12 RX ADMIN — Medication 1 GRAM(S): at 05:13

## 2021-06-12 RX ADMIN — Medication 50 MICROGRAM(S): at 05:14

## 2021-06-12 RX ADMIN — Medication 100 MILLIGRAM(S): at 05:13

## 2021-06-12 RX ADMIN — PANTOPRAZOLE SODIUM 40 MILLIGRAM(S): 20 TABLET, DELAYED RELEASE ORAL at 17:22

## 2021-06-12 RX ADMIN — TAMSULOSIN HYDROCHLORIDE 0.4 MILLIGRAM(S): 0.4 CAPSULE ORAL at 21:13

## 2021-06-12 RX ADMIN — PANTOPRAZOLE SODIUM 40 MILLIGRAM(S): 20 TABLET, DELAYED RELEASE ORAL at 05:13

## 2021-06-12 RX ADMIN — MONTELUKAST 10 MILLIGRAM(S): 4 TABLET, CHEWABLE ORAL at 11:15

## 2021-06-12 RX ADMIN — FINASTERIDE 5 MILLIGRAM(S): 5 TABLET, FILM COATED ORAL at 11:15

## 2021-06-12 NOTE — DISCHARGE NOTE PROVIDER - NSDCCPCAREPLAN_GEN_ALL_CORE_FT
PRINCIPAL DISCHARGE DIAGNOSIS  Diagnosis: Gastrointestinal hemorrhage, unspecified gastrointestinal hemorrhage type  Assessment and Plan of Treatment: There are 2 common types of GI Bleed, Upper GI Bleed and Lower GI Bleed.  Upper GI Bleed affects the esophagus, stomach, and first part of the small intestine. Lower GI Bleed affects the colon and rectum.  Upper GI Bleed signs and symptoms to notify your Health Care Provider are vomiting blood, or coffee ground vomitus, and bowel movements that look like black tar.  Lower GI Bleed signs and symptoms to notify your health care provider are bright red bloody bowel movements.   Take your medications as prescribed by your Gastroenterologist.  If you have had an Endoscopy or Colonoscopy, follow up with your Gastroenterologist for Pathology results.  Avoid NSAIDs unless your Health Care Provider tells you that it is ok (Aspirin, Ibuprofen, Advil, Motrin, Aleve).  Follow up with your Gastroenterologist within 1-2 weeks of discharge.        SECONDARY DISCHARGE DIAGNOSES  Diagnosis: Essential hypertension  Assessment and Plan of Treatment: Essential hypertension    Diagnosis: Anemia due to acute blood loss  Assessment and Plan of Treatment: Anemia due to acute blood loss     PRINCIPAL DISCHARGE DIAGNOSIS  Diagnosis: Gastrointestinal hemorrhage, unspecified gastrointestinal hemorrhage type  Assessment and Plan of Treatment: There are 2 common types of GI Bleed, Upper GI Bleed and Lower GI Bleed.  Upper GI Bleed affects the esophagus, stomach, and first part of the small intestine. Lower GI Bleed affects the colon and rectum.  Upper GI Bleed signs and symptoms to notify your Health Care Provider are vomiting blood, or coffee ground vomitus, and bowel movements that look like black tar.  Lower GI Bleed signs and symptoms to notify your health care provider are bright red bloody bowel movements.   Take your medications as prescribed by your Gastroenterologist.  If you have had an Endoscopy or Colonoscopy, follow up with your Gastroenterologist for Pathology results.  Avoid NSAIDs unless your Health Care Provider tells you that it is ok (Aspirin, Ibuprofen, Advil, Motrin, Aleve).  Follow up with your Gastroenterologist within 1-2 weeks of discharge.  S/P endoscopy and colonoscopy, found to have non bleeding ulcers in   stomach and dudenum, also one polyp in colon, s/p polyp removal.  Follow up biopsy result with Dr Alva  Check with GI doctor to how long to continue carafate        SECONDARY DISCHARGE DIAGNOSES  Diagnosis: Essential hypertension  Assessment and Plan of Treatment: Continue current medications    Diagnosis: Anemia due to acute blood loss  Assessment and Plan of Treatment: Repeat labs in 1 week at your PCP office

## 2021-06-12 NOTE — DISCHARGE NOTE NURSING/CASE MANAGEMENT/SOCIAL WORK - PATIENT PORTAL LINK FT
You can access the FollowMyHealth Patient Portal offered by [facility] by registering at the following website: [email or web address]. By joining [email or web address] FollowMyHealth portal, you will also be able to view your health information using other applications (apps) compatible with our system.

## 2021-06-12 NOTE — PHYSICAL THERAPY INITIAL EVALUATION ADULT - ACTIVE RANGE OF MOTION EXAMINATION, REHAB EVAL
RUE in extension synergy pattern/Left UE Active ROM was WFL (within functional limits)/bilateral  lower extremity Active ROM was WFL (within functional limits)

## 2021-06-12 NOTE — PHYSICAL THERAPY INITIAL EVALUATION ADULT - PLANNED THERAPY INTERVENTIONS, PT EVAL
GOAL: Pt will be able to negotiate 4 steps independently in 2 weeks./balance training/bed mobility training/gait training/transfer training

## 2021-06-12 NOTE — DISCHARGE NOTE NURSING/CASE MANAGEMENT/SOCIAL WORK - NSDCPNINST_GEN_ALL_CORE
CALL MD/911/GO TO ER WITH ANY CHEST PAIN/FEVER/SHORTNESS OF BREATH/BLEEDING/WEAKNESS/ANY CHANGE IN STATUS AT ALL! BE SURE TO TAKE ALL MEDICATION AS PRESCRIBED AND FOLLOW UP WITH MD AS DIRECTED. FEEL BETTER :)

## 2021-06-12 NOTE — DISCHARGE NOTE PROVIDER - CARE PROVIDER_API CALL
Kimo Franco  GASTROENTEROLOGY  [phone] [place] [address]  Phone: [phone]  Fax: [phone]  Follow Up Time:

## 2021-06-12 NOTE — DISCHARGE NOTE PROVIDER - HOSPITAL COURSE
76M w/ hx of duodenal ulcer/AVM, hx of ileostomy s/p reversal, hx of Type B aortic dissection, hx of CVA w/ RUE weakness, HTN, gout p/w blood per rectum.  Hemoglobin was stable since the admission. S/P endoscopy/colonoscopy on 6/11/21, found to have non bleeding ulcers in stomach and duodenum S/P APC, also   found to have one polyp in colon, which was resected  and biopsied. Patient tolerated regular diet.        76M w/ hx of duodenal ulcer/AVM, hx of ileostomy s/p reversal, hx of Type B aortic dissection, hx of CVA w/ RUE weakness, HTN, gout p/w blood per rectum.  Hemoglobin was stable since the admission. S/P endoscopy/colonoscopy on 6/11/21, found to have non bleeding ulcers in stomach and duodenum S/P APC, also   found to have one polyp in colon, which was resected  and biopsied. Patient tolerated regular diet.   He was seen by GI and PT. He had EGD/Colonoscopy yesterday and shows normal esophagus, 2 nonbleeding angioectasias in the stomach and coagulated, 1 nonbleeding angioectasia in duodenum, 4 mm polyp at the anastomosis, 1 polyp in the ilium, biopsied. Hemo dynamically stable to be discharged home with home PT today, spoke to Attending.

## 2021-06-12 NOTE — DISCHARGE NOTE PROVIDER - NSDCMRMEDTOKEN_GEN_ALL_CORE_FT
albuterol 90 mcg/inh inhalation aerosol: 2 puff(s) inhaled every 6 hours, As Needed  allopurinol 100 mg oral tablet: 1 tab(s) orally 2 times a day  ferrous sulfate: 1 tab(s) orally once a day  finasteride 5 mg oral tablet: 1 tab(s) orally once a day  levothyroxine 50 mcg (0.05 mg) oral tablet: 1 tab(s) orally once a day  metoprolol succinate 25 mg oral tablet, extended release: 1 tab(s) orally once a day  montelukast 10 mg oral tablet: 1 tab(s) orally once a day  Nuedexta 20 mg-10 mg oral capsule: 1 cap(s) orally every 12 hours  pantoprazole 40 mg oral delayed release tablet: 1 tab(s) orally 2 times a day  sucralfate 1 g/10 mL oral suspension: 10 milliliter(s) orally 4 times a day  tamsulosin 0.4 mg oral capsule: 1 cap(s) orally once a day  Vitamin D3: 1000 unit(s) orally once a day

## 2021-06-12 NOTE — PHYSICAL THERAPY INITIAL EVALUATION ADULT - ADDITIONAL COMMENTS
Pt lives with spouse in a private home, they stay on 1 level, ~2-3 steps to enter. PTA pts wife assist with all ADLs and functional mobility. Pt unable to use RW as RUE contracted. Per wife, have tried cane with LUE but pt not able to use due to coordination.

## 2021-06-12 NOTE — PHYSICAL THERAPY INITIAL EVALUATION ADULT - ADL SKILLS, REHAB EVAL
Please call the [facility] at [phone] if any of your medications change.  This means: if a med is discontinued, a new med is started, or a dose is changed.  These meds may interact with your warfarin and we may need to adjust your dose.  This is especially important if you start any type of ANTIBIOTIC.    Also, please call if you have any admissions to the hospital, visits to an emergency room, procedures planned, or if any other medical provider has instructed you to hold your warfarin.    
needs device and assist

## 2021-06-12 NOTE — PHYSICAL THERAPY INITIAL EVALUATION ADULT - PERTINENT HX OF CURRENT PROBLEM, REHAB EVAL
Pt is a 75 y/o male with PMH of duodenal ulcer/AVM, hx of ileostomy s/p reversal, hx of Type B aortic dissection, hx of CVA w/ RUE weakness, HTN, gout p/w blood per rectum.

## 2021-06-13 VITALS
HEART RATE: 62 BPM | SYSTOLIC BLOOD PRESSURE: 157 MMHG | OXYGEN SATURATION: 96 % | TEMPERATURE: 98 F | DIASTOLIC BLOOD PRESSURE: 75 MMHG | RESPIRATION RATE: 18 BRPM

## 2021-06-13 LAB
ANION GAP SERPL CALC-SCNC: 10 MMOL/L — SIGNIFICANT CHANGE UP (ref 5–17)
BUN SERPL-MCNC: 8 MG/DL — SIGNIFICANT CHANGE UP (ref 7–23)
CALCIUM SERPL-MCNC: 8.2 MG/DL — LOW (ref 8.4–10.5)
CHLORIDE SERPL-SCNC: 105 MMOL/L — SIGNIFICANT CHANGE UP (ref 96–108)
CO2 SERPL-SCNC: 23 MMOL/L — SIGNIFICANT CHANGE UP (ref 22–31)
CREAT SERPL-MCNC: 1.06 MG/DL — SIGNIFICANT CHANGE UP (ref 0.5–1.3)
GLUCOSE SERPL-MCNC: 102 MG/DL — HIGH (ref 70–99)
HCT VFR BLD CALC: 31.3 % — LOW (ref 39–50)
HGB BLD-MCNC: 9.4 G/DL — LOW (ref 13–17)
MCHC RBC-ENTMCNC: 25.9 PG — LOW (ref 27–34)
MCHC RBC-ENTMCNC: 30 GM/DL — LOW (ref 32–36)
MCV RBC AUTO: 86.2 FL — SIGNIFICANT CHANGE UP (ref 80–100)
NRBC # BLD: 0 /100 WBCS — SIGNIFICANT CHANGE UP (ref 0–0)
PLATELET # BLD AUTO: 200 K/UL — SIGNIFICANT CHANGE UP (ref 150–400)
POTASSIUM SERPL-MCNC: 3.5 MMOL/L — SIGNIFICANT CHANGE UP (ref 3.5–5.3)
POTASSIUM SERPL-SCNC: 3.5 MMOL/L — SIGNIFICANT CHANGE UP (ref 3.5–5.3)
RBC # BLD: 3.63 M/UL — LOW (ref 4.2–5.8)
RBC # FLD: 15.4 % — HIGH (ref 10.3–14.5)
SODIUM SERPL-SCNC: 138 MMOL/L — SIGNIFICANT CHANGE UP (ref 135–145)
WBC # BLD: 6.17 K/UL — SIGNIFICANT CHANGE UP (ref 3.8–10.5)
WBC # FLD AUTO: 6.17 K/UL — SIGNIFICANT CHANGE UP (ref 3.8–10.5)

## 2021-06-13 PROCEDURE — 85730 THROMBOPLASTIN TIME PARTIAL: CPT

## 2021-06-13 PROCEDURE — 99285 EMERGENCY DEPT VISIT HI MDM: CPT | Mod: 25

## 2021-06-13 PROCEDURE — 97161 PT EVAL LOW COMPLEX 20 MIN: CPT

## 2021-06-13 PROCEDURE — 80048 BASIC METABOLIC PNL TOTAL CA: CPT

## 2021-06-13 PROCEDURE — 87635 SARS-COV-2 COVID-19 AMP PRB: CPT

## 2021-06-13 PROCEDURE — 88305 TISSUE EXAM BY PATHOLOGIST: CPT

## 2021-06-13 PROCEDURE — 86769 SARS-COV-2 COVID-19 ANTIBODY: CPT

## 2021-06-13 PROCEDURE — 74177 CT ABD & PELVIS W/CONTRAST: CPT

## 2021-06-13 PROCEDURE — 86901 BLOOD TYPING SEROLOGIC RH(D): CPT

## 2021-06-13 PROCEDURE — 80053 COMPREHEN METABOLIC PANEL: CPT

## 2021-06-13 PROCEDURE — 85027 COMPLETE CBC AUTOMATED: CPT

## 2021-06-13 PROCEDURE — 85610 PROTHROMBIN TIME: CPT

## 2021-06-13 PROCEDURE — 86900 BLOOD TYPING SEROLOGIC ABO: CPT

## 2021-06-13 PROCEDURE — 86850 RBC ANTIBODY SCREEN: CPT

## 2021-06-13 PROCEDURE — 85025 COMPLETE CBC W/AUTO DIFF WBC: CPT

## 2021-06-13 RX ADMIN — PANTOPRAZOLE SODIUM 40 MILLIGRAM(S): 20 TABLET, DELAYED RELEASE ORAL at 05:14

## 2021-06-13 RX ADMIN — Medication 1 GRAM(S): at 05:14

## 2021-06-13 RX ADMIN — Medication 50 MICROGRAM(S): at 05:14

## 2021-06-13 RX ADMIN — Medication 100 MILLIGRAM(S): at 05:14

## 2021-06-13 NOTE — PROGRESS NOTE ADULT - PROVIDER SPECIALTY LIST ADULT
Internal Medicine
Gastroenterology
Gastroenterology

## 2021-06-13 NOTE — PROGRESS NOTE ADULT - ASSESSMENT
76M w/ hx of duodenal ulcer/AVM, hx of ileostomy s/p reversal, hx of Type B aortic dissection, hx of CVA w/ RUE weakness, HTN, gout p/w blood per rectum.     Problem/Plan - 1:  ·  Problem: Anemia due to acute blood loss.   hg stable   - c/w home PPI.   gi eval noted  upper and lower endoscopy noted  duodenal/stomach angioectasias cauterized  tolerating diet     Problem/Plan - 2:  ·  Problem: Gastrointestinal hemorrhage, unspecified gastrointestinal hemorrhage type.  Plan: see above.      Problem/Plan - 3:  ·  Problem: Essential hypertension.  Plan: hold antihypertensives given bleeding.      Problem/Plan - 4:  ·  Problem: Prophylactic measure.  Plan: scd for dvt ppx.     discussed w/ wife at bedside    d/c home       
76M w/ hx of duodenal ulcer/AVM, hx of ileostomy s/p reversal, hx of Type B aortic dissection, hx of CVA w/ RUE weakness, HTN, gout p/w blood per rectum.     Problem/Plan - 1:  ·  Problem: Anemia due to acute blood loss.   - monitor CBC   - transfuse if Hb<7   - c/w home PPI.   gi eval noted  upper and lower endoscopy noted  duodenal/stomach angioectasias cauterized  adv diet as per gi      Problem/Plan - 2:  ·  Problem: Gastrointestinal hemorrhage, unspecified gastrointestinal hemorrhage type.  Plan: see above.      Problem/Plan - 3:  ·  Problem: Essential hypertension.  Plan: hold antihypertensives given bleeding.      Problem/Plan - 4:  ·  Problem: Prophylactic measure.  Plan: scd for dvt ppx.     discussed w/ wife at bedside      
76M w/ hx of duodenal ulcer/AVM, hx of ileostomy s/p reversal, hx of Type B aortic dissection, hx of CVA w/ RUE weakness, HTN, gout p/w blood per rectum.     Problem/Plan - 1:  ·  Problem: Anemia due to acute blood loss.   - monitor CBC   - transfuse if Hb<7   - c/w home PPI.   gi eval noted  upper and lower endoscopy today  npo   iv fluids     Problem/Plan - 2:  ·  Problem: Gastrointestinal hemorrhage, unspecified gastrointestinal hemorrhage type.  Plan: see above.      Problem/Plan - 3:  ·  Problem: Essential hypertension.  Plan: hold antihypertensives given bleeding.      Problem/Plan - 4:  ·  Problem: Prophylactic measure.  Plan: scd for dvt ppx.     discussed w/ wife at bedside      
76M w/ hx of duodenal ulcer/AVM, hx of ileostomy s/p reversal, hx of Type B aortic dissection, hx of CVA w/ RUE weakness, HTN, gout p/w blood per rectum.     Problem/Plan - 1:  ·  Problem: Anemia due to acute blood loss.   - monitor CBC   - transfuse if Hb<7 o  - c/w home PPI.   gi eval  likely colonoscopy needed       Problem/Plan - 2:  ·  Problem: Gastrointestinal hemorrhage, unspecified gastrointestinal hemorrhage type.  Plan: see above.      Problem/Plan - 3:  ·  Problem: Essential hypertension.  Plan: hold antihypertensives given bleeding.      Problem/Plan - 4:  ·  Problem: Prophylactic measure.  Plan: scd for dvt ppx.     discussed w/ wife at bedside

## 2021-06-13 NOTE — PROGRESS NOTE ADULT - REASON FOR ADMISSION
76M p/w blood per rectum

## 2021-06-13 NOTE — PROGRESS NOTE ADULT - SUBJECTIVE AND OBJECTIVE BOX
DATE OF SERVICE: 06-12-21 @ 08:06  CHIEF COMPLAINT:Patient is a 76y old  Male who presents with a chief complaint of 76M p/w blood per rectum (11 Jun 2021 12:56)    	        PAST MEDICAL & SURGICAL HISTORY:  HTN - Hypertension    BPH (Benign Prostatic Hyperplasia)    Gout    Aortic aneurysm and dissection  type A with rupture 8/2013    Aortic aneurysm, abdominal    Aortic aneurysm rupture  with residual Type B dissection    Anoxic brain injury    CVA (cerebral infarction)  with RUE weakness    Seizures  8/2014 on keppra last EEG - no activity    Hypothyroid    Ischemic bowel disease  s/p exploratory laparotomy and creation of ileostomy 8/2013    Gastrostomy in place  surgical gastrostomy, NOT a PEG    Coronary artery disease involving native heart with angina pectoris, unspecified vessel or lesion type  Non-obstructive CAD.    Heartburn    S/P ileostomy  exploratory laparotomy, creation of ileostomy and PEG on 8/24/13 due to ischemic bowel    Ascending aortic dissection  s/p repair on 8/19/2013    History of tracheostomy  on 8/2013 with decannulation    H/O ischemic bowel disease  s/p resection in 2013            REVIEW OF SYSTEMS:    EYES: No eye pain, visual disturbances, or discharge  NECK: No pain or stiffness  RESPIRATORY: No cough, wheezing, chills or hemoptysis; No Shortness of Breath  CARDIOVASCULAR: No chest pain, palpitations, passing out, dizziness, or leg swelling  GASTROINTESTINAL: No abdominal or epigastric pain.  no further bleeding  GENITOURINARY: No dysuria, frequency, hematuria, or incontinence  NEUROLOGICAL: No headaches,     Medications:  MEDICATIONS  (STANDING):  allopurinol 100 milliGRAM(s) Oral two times a day  finasteride 5 milliGRAM(s) Oral daily  levothyroxine 50 MICROGram(s) Oral daily  montelukast 10 milliGRAM(s) Oral daily  pantoprazole    Tablet 40 milliGRAM(s) Oral two times a day  sucralfate 1 Gram(s) Oral four times a day  tamsulosin 0.4 milliGRAM(s) Oral at bedtime    MEDICATIONS  (PRN):  ALBUTerol    90 MICROgram(s) HFA Inhaler 2 Puff(s) Inhalation every 6 hours PRN Shortness of Breath and/or Wheezing    	    PHYSICAL EXAM:  T(C): 36.6 (06-12-21 @ 04:59), Max: 36.8 (06-11-21 @ 09:09)  HR: 61 (06-12-21 @ 04:59) (52 - 73)  BP: 148/76 (06-12-21 @ 04:59) (99/63 - 157/76)  RR: 19 (06-12-21 @ 04:59) (18 - 24)  SpO2: 97% (06-12-21 @ 04:59) (94% - 100%)  Wt(kg): --  I&O's Summary    11 Jun 2021 07:01  -  12 Jun 2021 07:00  --------------------------------------------------------  IN: 1080 mL / OUT: 900 mL / NET: 180 mL        Appearance: Normal	  HEENT:   Normal oral mucosa, PERRL, EOMI	  Lymphatic: No lymphadenopathy  Cardiovascular: Normal S1 S2, No JVD, No murmurs, No edema  Respiratory: Lungs clear to auscultation	  Psychiatry: A & O  Gastrointestinal:  Soft, Non-tender, + BS	  Skin: No rashes, No ecchymoses, No cyanosis	  Neurologic: Non-focal  Extremities: Normal range of motion, No clubbing, cyanosis or edema  Vascular: Peripheral pulses palpable 2+ bilaterally    TELEMETRY: 	    ECG:  	  RADIOLOGY:  OTHER: 	  	  LABS:	 	    CARDIAC MARKERS:                                9.5    6.59  )-----------( x        ( 12 Jun 2021 07:15 )             32.2           proBNP:   Lipid Profile:   HgA1c:   TSH:     	        
INTERVAL HPI/OVERNIGHT EVENTS: patient is resting comfortable                                 denies any rectal blding wife by his bedside    MEDICATIONS  (STANDING):  allopurinol 100 milliGRAM(s) Oral two times a day  finasteride 5 milliGRAM(s) Oral daily  levothyroxine 50 MICROGram(s) Oral daily  montelukast 10 milliGRAM(s) Oral daily  pantoprazole    Tablet 40 milliGRAM(s) Oral two times a day  sucralfate suspension 1 Gram(s) Oral four times a day  tamsulosin 0.4 milliGRAM(s) Oral at bedtime    MEDICATIONS  (PRN):  ALBUTerol    90 MICROgram(s) HFA Inhaler 2 Puff(s) Inhalation every 6 hours PRN Shortness of Breath and/or Wheezing      Allergies    penicillin (Pruritus)    Intolerances            PHYSICAL EXAM:   Vital Signs:  Vital Signs Last 24 Hrs  T(C): 36.6 (12 Jun 2021 08:32), Max: 36.6 (11 Jun 2021 23:51)  T(F): 97.8 (12 Jun 2021 08:32), Max: 97.9 (12 Jun 2021 04:59)  HR: 74 (12 Jun 2021 08:32) (52 - 74)  BP: 126/74 (12 Jun 2021 08:32) (99/63 - 157/76)  BP(mean): --  RR: 19 (12 Jun 2021 08:32) (18 - 24)  SpO2: 98% (12 Jun 2021 08:32) (97% - 100%)  Daily Height in cm: 172.72 (11 Jun 2021 16:53)    Daily     GENERAL:  no distress  HEENT:  NC/AT,   CHEST:  breath sounds clear  HEART:  Regular rhythm  ABDOMEN:  Soft, non-tender, BS present,   EXTEREMITIES:  no edema      LABS:                        9.5    6.59  )-----------( 138      ( 12 Jun 2021 07:15 )             32.2     06-12    139  |  108  |  10  ----------------------------<  102<H>  3.9   |  18<L>  |  1.06    Ca    8.5      12 Jun 2021 07:13            RADIOLOGY & ADDITIONAL TESTS:  
DATE OF SERVICE: 06-13-21 @ 09:31  CHIEF COMPLAINT:Patient is a 76y old  Male who presents with a chief complaint of 76M p/w blood per rectum (12 Jun 2021 14:09)    	        PAST MEDICAL & SURGICAL HISTORY:  HTN - Hypertension    BPH (Benign Prostatic Hyperplasia)    Gout    Aortic aneurysm and dissection  type A with rupture 8/2013    Aortic aneurysm, abdominal    Aortic aneurysm rupture  with residual Type B dissection    Anoxic brain injury    CVA (cerebral infarction)  with RUE weakness    Seizures  8/2014 on keppra last EEG - no activity    Hypothyroid    Ischemic bowel disease  s/p exploratory laparotomy and creation of ileostomy 8/2013    Gastrostomy in place  surgical gastrostomy, NOT a PEG    Coronary artery disease involving native heart with angina pectoris, unspecified vessel or lesion type  Non-obstructive CAD.    Heartburn    S/P ileostomy  exploratory laparotomy, creation of ileostomy and PEG on 8/24/13 due to ischemic bowel    Ascending aortic dissection  s/p repair on 8/19/2013    History of tracheostomy  on 8/2013 with decannulation    H/O ischemic bowel disease  s/p resection in 2013            REVIEW OF SYSTEMS:  CONSTITUTIONAL: No fever, weight loss, or fatigue    NECK: No pain or stiffness  RESPIRATORY: No cough, wheezing, chills or hemoptysis; No Shortness of Breath  CARDIOVASCULAR: No chest pain, palpitations, passing out, dizziness,   GASTROINTESTINAL: No abdominal or epigastric pain. No nausea, vomiting, or hematemesis; No diarrhea   GENITOURINARY: No dysuria, frequency, hematuria, or incontinence  NEUROLOGICAL: No headaches,   Medications:  MEDICATIONS  (STANDING):  allopurinol 100 milliGRAM(s) Oral two times a day  finasteride 5 milliGRAM(s) Oral daily  levothyroxine 50 MICROGram(s) Oral daily  montelukast 10 milliGRAM(s) Oral daily  pantoprazole    Tablet 40 milliGRAM(s) Oral two times a day  sucralfate suspension 1 Gram(s) Oral four times a day  tamsulosin 0.4 milliGRAM(s) Oral at bedtime    MEDICATIONS  (PRN):  ALBUTerol    90 MICROgram(s) HFA Inhaler 2 Puff(s) Inhalation every 6 hours PRN Shortness of Breath and/or Wheezing    	    PHYSICAL EXAM:  T(C): 36.8 (06-13-21 @ 07:59), Max: 37.3 (06-12-21 @ 11:45)  HR: 62 (06-13-21 @ 07:59) (58 - 68)  BP: 157/75 (06-13-21 @ 07:59) (125/67 - 157/75)  RR: 18 (06-13-21 @ 07:59) (18 - 18)  SpO2: 96% (06-13-21 @ 07:59) (96% - 99%)  Wt(kg): --  I&O's Summary    12 Jun 2021 07:01  -  13 Jun 2021 07:00  --------------------------------------------------------  IN: 720 mL / OUT: 0 mL / NET: 720 mL        Appearance: Normal	  HEENT:   Normal oral mucosa, PERRL, EOMI	  Lymphatic: No lymphadenopathy  Cardiovascular: Normal S1 S2, No JVD, No murmurs, No edema  Respiratory: Lungs clear to auscultation	  Psychiatry: A & O x 3, Mood & affect appropriate  Gastrointestinal:  Soft, Non-tender, + BS	  Skin: No rashes, No ecchymoses, No cyanosis	  Neurologic: Non-focal  Extremities: Normal range of motion, No clubbing, cyanosis or edema  Vascular: Peripheral pulses palpable 2+ bilaterally    TELEMETRY: 	    ECG:  	  RADIOLOGY:  OTHER: 	  	  LABS:	 	    CARDIAC MARKERS:                                9.4    6.17  )-----------( 200      ( 13 Jun 2021 06:14 )             31.3     06-13    138  |  105  |  8   ----------------------------<  102<H>  3.5   |  23  |  1.06    Ca    8.2<L>      13 Jun 2021 06:13      proBNP:   Lipid Profile:   HgA1c:   TSH:     	        
DATE OF SERVICE: 06-10-21 @ 09:08  CHIEF COMPLAINT:Patient is a 76y old  Male who presents with a chief complaint of 76M p/w blood per rectum (10 William 2021 08:46)    	        PAST MEDICAL & SURGICAL HISTORY:  HTN - Hypertension    BPH (Benign Prostatic Hyperplasia)    Gout    Aortic aneurysm and dissection  type A with rupture 8/2013    Aortic aneurysm, abdominal    Aortic aneurysm rupture  with residual Type B dissection    Anoxic brain injury    CVA (cerebral infarction)  with RUE weakness    Seizures  8/2014 on keppra last EEG - no activity    Hypothyroid    Ischemic bowel disease  s/p exploratory laparotomy and creation of ileostomy 8/2013    Gastrostomy in place  surgical gastrostomy, NOT a PEG    Coronary artery disease involving native heart with angina pectoris, unspecified vessel or lesion type  Non-obstructive CAD.    Heartburn    S/P ileostomy  exploratory laparotomy, creation of ileostomy and PEG on 8/24/13 due to ischemic bowel    Ascending aortic dissection  s/p repair on 8/19/2013    History of tracheostomy  on 8/2013 with decannulation    H/O ischemic bowel disease  s/p resection in 2013            REVIEW OF SYSTEMS:    NECK: No pain or stiffness  RESPIRATORY: No cough, wheezing, chills or hemoptysis; No Shortness of Breath  CARDIOVASCULAR: No chest pain, palpitations, passing out, dizziness, or leg swelling  GASTROINTESTINAL: rectal bleeding  GENITOURINARY: No dysuria, frequency, hematuria, or incontinence  NEUROLOGICAL: No headaches,     Medications:  MEDICATIONS  (STANDING):  allopurinol 100 milliGRAM(s) Oral two times a day  dextrose 5% + sodium chloride 0.9%. 1000 milliLiter(s) (70 mL/Hr) IV Continuous <Continuous>  finasteride 5 milliGRAM(s) Oral daily  levothyroxine 50 MICROGram(s) Oral daily  montelukast 10 milliGRAM(s) Oral daily  pantoprazole    Tablet 40 milliGRAM(s) Oral two times a day  tamsulosin 0.4 milliGRAM(s) Oral at bedtime    MEDICATIONS  (PRN):  ALBUTerol    90 MICROgram(s) HFA Inhaler 2 Puff(s) Inhalation every 6 hours PRN Shortness of Breath and/or Wheezing    	    PHYSICAL EXAM:  T(C): 36.7 (06-10-21 @ 08:22), Max: 37.1 (06-09-21 @ 21:34)  HR: 66 (06-10-21 @ 08:22) (65 - 86)  BP: 134/78 (06-10-21 @ 08:22) (127/70 - 171/91)  RR: 18 (06-10-21 @ 08:22) (18 - 20)  SpO2: 95% (06-10-21 @ 08:22) (95% - 98%)  Wt(kg): --  I&O's Summary      Appearance: Normal	  HEENT:   Normal oral mucosa, PERRL, EOMI	    Cardiovascular: Normal S1 S2, No JVD, No murmurs, No edema  Respiratory: Lungs clear to auscultation	  Psychiatry: A & O   Gastrointestinal:  Soft, Non-tender, + BS	  Skin: No rashes, No ecchymoses, No cyanosis	  Neurologic: Non-focal  Extremities: Normal range of motion, No clubbing, cyanosis or edema  Vascular: Peripheral pulses palpable     TELEMETRY: 	    ECG:  	  RADIOLOGY:  OTHER: 	  	  LABS:	 	    CARDIAC MARKERS:                                9.9    4.21  )-----------( 207      ( 10 William 2021 05:44 )             32.6     06-10    139  |  104  |  16  ----------------------------<  95  3.9   |  24  |  0.96    Ca    9.1      10 William 2021 05:44    TPro  7.2  /  Alb  3.8  /  TBili  0.3  /  DBili  x   /  AST  25  /  ALT  17  /  AlkPhos  95  06-09    proBNP:   Lipid Profile:   HgA1c:   TSH:     	        
DATE OF SERVICE: 06-11-21 @ 12:56  CHIEF COMPLAINT:Patient is a 76y old  Male who presents with a chief complaint of 76M p/w blood per rectum (10 William 2021 09:08)    	        PAST MEDICAL & SURGICAL HISTORY:  HTN - Hypertension    BPH (Benign Prostatic Hyperplasia)    Gout    Aortic aneurysm and dissection  type A with rupture 8/2013    Aortic aneurysm, abdominal    Aortic aneurysm rupture  with residual Type B dissection    Anoxic brain injury    CVA (cerebral infarction)  with RUE weakness    Seizures  8/2014 on keppra last EEG - no activity    Hypothyroid    Ischemic bowel disease  s/p exploratory laparotomy and creation of ileostomy 8/2013    Gastrostomy in place  surgical gastrostomy, NOT a PEG    Coronary artery disease involving native heart with angina pectoris, unspecified vessel or lesion type  Non-obstructive CAD.    Heartburn    S/P ileostomy  exploratory laparotomy, creation of ileostomy and PEG on 8/24/13 due to ischemic bowel    Ascending aortic dissection  s/p repair on 8/19/2013    History of tracheostomy  on 8/2013 with decannulation    H/O ischemic bowel disease  s/p resection in 2013            REVIEW OF SYSTEMS:  CONSTITUTIONAL: No fever, weight loss, or fatigue  EYES: No eye pain, visual disturbances, or discharge  NECK: No pain or stiffness  RESPIRATORY: No cough, wheezing, chills or hemoptysis; No Shortness of Breath  CARDIOVASCULAR: No chest pain, palpitations, passing out, dizziness, or leg swelling  GASTROINTESTINAL:less bleeding  GENITOURINARY: No dysuria, frequency, hematuria, or incontinence  NEUROLOGICAL: No headaches,    MUSCULOSKELETAL: No joint pain or swelling; No muscle, back, or extremity pain    Medications:  MEDICATIONS  (STANDING):  allopurinol 100 milliGRAM(s) Oral two times a day  dextrose 5% + sodium chloride 0.9%. 1000 milliLiter(s) (70 mL/Hr) IV Continuous <Continuous>  finasteride 5 milliGRAM(s) Oral daily  levothyroxine 50 MICROGram(s) Oral daily  montelukast 10 milliGRAM(s) Oral daily  pantoprazole    Tablet 40 milliGRAM(s) Oral two times a day  tamsulosin 0.4 milliGRAM(s) Oral at bedtime    MEDICATIONS  (PRN):  ALBUTerol    90 MICROgram(s) HFA Inhaler 2 Puff(s) Inhalation every 6 hours PRN Shortness of Breath and/or Wheezing    	    PHYSICAL EXAM:  T(C): 36.3 (06-11-21 @ 12:06), Max: 36.8 (06-10-21 @ 14:36)  HR: 66 (06-11-21 @ 12:06) (62 - 84)  BP: 149/82 (06-11-21 @ 12:06) (128/68 - 163/82)  RR: 18 (06-11-21 @ 12:06) (17 - 18)  SpO2: 97% (06-11-21 @ 12:06) (94% - 99%)  Wt(kg): --  I&O's Summary    11 Jun 2021 07:01  -  11 Jun 2021 12:56  --------------------------------------------------------  IN: 0 mL / OUT: 800 mL / NET: -800 mL        Appearance: Normal	  HEENT:   Normal oral mucosa, PERRL, EOMI	  Lymphatic: No lymphadenopathy  Cardiovascular: Normal S1 S2, No JVD, No murmurs, No edema  Respiratory: Lungs clear to auscultation	  Psychiatry: A & O   Gastrointestinal:  Soft, Non-tender, + BS	  Skin: No rashes, No ecchymoses, No cyanosis	  Neurologic: Non-focal  Extremities: Normal range of motion, No clubbing, cyanosis or edema  Vascular: Peripheral pulses palpable 2+ bilaterally    TELEMETRY: 	    ECG:  	  RADIOLOGY:  OTHER: 	  	  LABS:	 	    CARDIAC MARKERS:                                11.6   5.44  )-----------( 260      ( 11 Jun 2021 06:59 )             37.7     06-10    139  |  104  |  16  ----------------------------<  95  3.9   |  24  |  0.96    Ca    9.1      10 William 2021 05:44    TPro  7.2  /  Alb  3.8  /  TBili  0.3  /  DBili  x   /  AST  25  /  ALT  17  /  AlkPhos  95  06-09    proBNP:   Lipid Profile:   HgA1c:   TSH:     	        
INTERVAL HPI/OVERNIGHT EVENTS: patient appears well and comfortable                                 had a BM no blood no abd pain wife is by his bad side    MEDICATIONS  (STANDING):  allopurinol 100 milliGRAM(s) Oral two times a day  finasteride 5 milliGRAM(s) Oral daily  levothyroxine 50 MICROGram(s) Oral daily  montelukast 10 milliGRAM(s) Oral daily  pantoprazole    Tablet 40 milliGRAM(s) Oral two times a day  sucralfate suspension 1 Gram(s) Oral four times a day  tamsulosin 0.4 milliGRAM(s) Oral at bedtime    MEDICATIONS  (PRN):  ALBUTerol    90 MICROgram(s) HFA Inhaler 2 Puff(s) Inhalation every 6 hours PRN Shortness of Breath and/or Wheezing      Allergies    penicillin (Pruritus)    Intolerances            PHYSICAL EXAM:   Vital Signs:  Vital Signs Last 24 Hrs  T(C): 36.8 (13 Jun 2021 07:59), Max: 37.3 (12 Jun 2021 11:45)  T(F): 98.3 (13 Jun 2021 07:59), Max: 99.2 (12 Jun 2021 11:45)  HR: 62 (13 Jun 2021 07:59) (58 - 68)  BP: 157/75 (13 Jun 2021 07:59) (125/67 - 157/75)  BP(mean): --  RR: 18 (13 Jun 2021 07:59) (18 - 18)  SpO2: 96% (13 Jun 2021 07:59) (96% - 99%)  Daily     Daily     GENERAL:  no distress  HEENT:  NC/AT,    CHEST:  breath sounds clear  HEART:  Regular rhythm  ABDOMEN:  Soft, non-tender, BS present,   EXTEREMITIES:  no edema      LABS:                        9.4    6.17  )-----------( 200      ( 13 Jun 2021 06:14 )             31.3     06-13    138  |  105  |  8   ----------------------------<  102<H>  3.5   |  23  |  1.06    Ca    8.2<L>      13 Jun 2021 06:13            RADIOLOGY & ADDITIONAL TESTS:

## 2021-06-13 NOTE — PROGRESS NOTE ADULT - PROBLEM SELECTOR PLAN 1
appears to be hemodynamically stable  EGD/Colon findings were discussed with patients wife and daughter  change Carafate to suspension   advance diet as tolerated  Discuss w nurse and NP   monitor CBC
patient is hemodynamically stable  patient and  wife were again reminded to call me for path results  agree w D/cing to home today

## 2021-06-14 LAB — SURGICAL PATHOLOGY STUDY: SIGNIFICANT CHANGE UP

## 2022-06-28 ENCOUNTER — OUTPATIENT (OUTPATIENT)
Dept: OUTPATIENT SERVICES | Facility: HOSPITAL | Age: 77
LOS: 1 days | End: 2022-06-28
Payer: MEDICARE

## 2022-06-28 ENCOUNTER — APPOINTMENT (OUTPATIENT)
Dept: CT IMAGING | Facility: CLINIC | Age: 77
End: 2022-06-28

## 2022-06-28 DIAGNOSIS — Z93.0 TRACHEOSTOMY STATUS: Chronic | ICD-10-CM

## 2022-06-28 DIAGNOSIS — I71.01 DISSECTION OF THORACIC AORTA: Chronic | ICD-10-CM

## 2022-06-28 DIAGNOSIS — I71.00 DISSECTION OF UNSPECIFIED SITE OF AORTA: ICD-10-CM

## 2022-06-28 DIAGNOSIS — Z87.19 PERSONAL HISTORY OF OTHER DISEASES OF THE DIGESTIVE SYSTEM: Chronic | ICD-10-CM

## 2022-06-28 DIAGNOSIS — Z93.2 ILEOSTOMY STATUS: Chronic | ICD-10-CM

## 2022-06-28 PROCEDURE — 74174 CTA ABD&PLVS W/CONTRAST: CPT | Mod: 26,MH

## 2022-06-28 PROCEDURE — 71275 CT ANGIOGRAPHY CHEST: CPT | Mod: 26,MH

## 2022-06-28 PROCEDURE — 74174 CTA ABD&PLVS W/CONTRAST: CPT

## 2022-06-28 PROCEDURE — 71275 CT ANGIOGRAPHY CHEST: CPT

## 2022-07-26 ENCOUNTER — NON-APPOINTMENT (OUTPATIENT)
Age: 77
End: 2022-07-26

## 2022-08-02 ENCOUNTER — NON-APPOINTMENT (OUTPATIENT)
Age: 77
End: 2022-08-02

## 2022-08-10 ENCOUNTER — APPOINTMENT (OUTPATIENT)
Dept: CARDIOTHORACIC SURGERY | Facility: CLINIC | Age: 77
End: 2022-08-10

## 2022-08-10 VITALS
DIASTOLIC BLOOD PRESSURE: 81 MMHG | HEIGHT: 68 IN | OXYGEN SATURATION: 97 % | SYSTOLIC BLOOD PRESSURE: 159 MMHG | BODY MASS INDEX: 23.49 KG/M2 | RESPIRATION RATE: 16 BRPM | WEIGHT: 155 LBS | TEMPERATURE: 98.8 F | HEART RATE: 65 BPM

## 2022-08-10 DIAGNOSIS — I71.00 DISSECTION OF UNSPECIFIED SITE OF AORTA: ICD-10-CM

## 2022-08-10 DIAGNOSIS — N40.0 BENIGN PROSTATIC HYPERPLASIA WITHOUT LOWER URINARY TRACT SYMPMS: ICD-10-CM

## 2022-08-10 DIAGNOSIS — Z98.890 OTHER SPECIFIED POSTPROCEDURAL STATES: ICD-10-CM

## 2022-08-10 DIAGNOSIS — I71.8 DISSECTION OF UNSPECIFIED SITE OF AORTA: ICD-10-CM

## 2022-08-10 DIAGNOSIS — E03.9 HYPOTHYROIDISM, UNSPECIFIED: ICD-10-CM

## 2022-08-10 DIAGNOSIS — Z86.79 OTHER SPECIFIED POSTPROCEDURAL STATES: ICD-10-CM

## 2022-08-10 PROCEDURE — 99215 OFFICE O/P EST HI 40 MIN: CPT

## 2022-08-12 RX ORDER — MONTELUKAST SODIUM 10 MG/1
10 TABLET, FILM COATED ORAL
Qty: 1 | Refills: 1 | Status: ACTIVE | COMMUNITY
Start: 2022-08-12

## 2022-08-12 RX ORDER — ALBUTEROL SULFATE 90 UG/1
108 (90 BASE) INHALANT RESPIRATORY (INHALATION) EVERY 4 HOURS
Qty: 10 | Refills: 0 | Status: ACTIVE | COMMUNITY
Start: 2022-08-12

## 2022-08-12 RX ORDER — PANTOPRAZOLE 40 MG/1
40 TABLET, DELAYED RELEASE ORAL DAILY
Qty: 30 | Refills: 0 | Status: ACTIVE | COMMUNITY
Start: 2022-08-12

## 2022-08-12 RX ORDER — DEXTROMETHORPHAN HYDROBROMIDE AND QUINIDINE SULFATE 20; 10 MG/1; MG/1
20-10 CAPSULE, GELATIN COATED ORAL TWICE DAILY
Refills: 0 | Status: ACTIVE | COMMUNITY
Start: 2022-08-12

## 2022-08-12 NOTE — REVIEW OF SYSTEMS
[Lower Ext Edema] : lower extremity edema [Shortness Of Breath] : shortness of breath [SOB on Exertion] : shortness of breath during exertion [Negative] : Heme/Lymph

## 2022-08-18 NOTE — DATA REVIEWED
[FreeTextEntry1] : CTA chest, abdomen and pelvis 6/28/22\par status post ascending aortic dissection repair persistent dissection type B in nature extending into the thoracic /abdominal aorta.\par

## 2022-08-18 NOTE — CONSULT LETTER
[Dear  ___] : Dear  [unfilled], [FreeTextEntry1] : \par I had the pleasure of seeing your patient, KATHY ESCALERA , in my office today. We take a multidisciplinary team approach to patient care and consider you, the referring physician, an extension of our team. We will maintain an open line of communication with you throughout your patient's treatment course.  \par \par Mr. ESCALERA presents for an initial evaluation and management of thoracic aortic aneurysm. I have reviewed all of his  medical records and diagnostic images at the time of his office consultation. I have enclosed a copy for your records. \par \par I have also reviewed the indications for surgery, and used our webpage <<[email or web address]>> to illustrate the aorta and anatomy of the heart. I have recommended to Enroll in Triomphe endospan stent graft  trial . \par \par I have discussed with the patient that we will continue to monitor his  aortic pathology closely at the [facility] that encompasses the entire health care system and is one of the largest in the [place] at this point.\par \par It is our commitment to provide your patient with the highest quality of advanced therapeutic options. On behalf of the Cardiothoracic Surgery Team, thank you for sending your patient to the [facility] based at [facility].  If there are any questions or concerns, please call me directly at [phone].  \par \par Sincerely, \par \par \par \par \par Dino Juarez M.D.\par Professor of Cardiovascular and Thoracic Surgery\par Minimally Invasive Valve Surgeon\par Director of Aortic Surgery, [facility]\par Cell: [phone]\par Email: [email or web address] \par \par [facility]:\par [address], [address], [place] [address]\par Office: [phone]\par Fax: [phone]\par \par [facility]:\par Department of Cardiovascular and Thoracic Surgery\par [address] [place] [address], [address]\par Office: [phone]\par Fax: [phone]\par \par Practice Manager: Ms. Tita Burrell\par Email: [email or web address]\par Phone: [phone]\par \par \par \par \par \par

## 2022-08-18 NOTE — PROCEDURE
[FreeTextEntry1] :  Dr. Juarez reviewed the indications for surgery, and used our webpage [email or web address] <[email or web address]> to illustrate the aorta and anatomy of the heart. Those indications are the following: size greater than 5.0 cm, symptomatic aneurysms, family history of aortic dissection or aneurysm death with a size greater than 4.5 cm, other necessary cardiac procedures such as coronary artery bypass grafting or valvular disorders with an aneurysm greater than 4.5 cm, or connective tissue disorders with an aneurysm size greater than 4.5 cm. The patient does not meet size criteria for surgical intervention at the time. Patient was advised to view the educational video prior to this visit regarding aortic pathology, risk factors, surgical procedures, and lifestyle modifications. Video can be retrieved at <[email or web address].com/watch?[email or web address].\par \par Dr. Juarez discussed activity restrictions with the patient, and would advise exercise at a moderate amount with no heavy lifting over one third of body weight, and avoiding heart rates that exceed 140 beats per minute. In addition, every patient should abstain from tobacco abuse and to avoid all illicit drug use, especially stimulants such as cocaine or methamphetamine. Dr. Juarez also counseled regarding maintaining a healthy heart diet, and losing any excessive weight as this also put undue stress on both the aorta and entire cardiovascular system. First degree family members should be screened for bicuspid valve disease, and ascending aortic aneurysms. \par \par \par

## 2022-08-18 NOTE — HISTORY OF PRESENT ILLNESS
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 \par \par This visit , he reports occasional dyspnea on exertion x 1 year and pedal edema x 2 yrs. He can walk up to 6 - 7 blocks and 1 FOS without shortness of breath. He uses 2 pillows to sleep. \par \par Patient is doing well and denies recent hospitalization, ER visits, or surgeries. He denies fever, chills, fatigue, headache, blurred vision, dizziness, syncope, chest pain, palpitations, orthopnea, paroxysmal nocturnal dyspnea, nausea, vomiting, abdominal pain, back pain, headache, visual disturbances, CVA, PE, DVT, D/C, hematochezia, melena, dysuria, hematuria,  BRBPR .\par \par \par \par CTA chest, abdomen and pelvis 6/28/22\par status post ascending aortic dissection repair persistent dissection type B in nature extending into the thoracic /abdominal aorta.\par

## 2022-08-18 NOTE — ASSESSMENT
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 \par \par This visit , he reports occasional dyspnea on exertion x 1 year and pedal edema x 2 yrs. He can walk up to 6 - 7 blocks and 1 FOS without shortness of breath. He uses 2 pillows to sleep. \par \par  reviewed the cardiac imaging, medical records and reports with patient and discussed the case.  discussed the risks , benefits and alternatives to surgery. Risks included but not limited to  bleeding , stroke, Myocardial Infarction, kidney problems,Blood transfusion ,permanent  pacemaker implantation,  infections and death.  quoted a (15% ) operative mortality and complication risks.  also discussed the various approaches in detail ( Transverse arch replacement Vs Triomphe endospan stent graft  trial ) . will final recommendations after reviewing the rephased CT scan with the team. . All questions and concerns were addressed and patient agrees to proceed with the plan. \par \par \par \par Plan\par \par 1. Enroll in Triomphe endospan stent graft  trial \par 2. CT Submission Consent for the TRIOMPHE study signed \par 3. Will call with recommendation after the reviewing the rephased CT scan  \par \par \par \par \par

## 2022-08-18 NOTE — END OF VISIT
[FreeTextEntry3] : Scribe Attestation:\par I personally scribed for RUSSELL LEON on Aug 10 2022 10:00AM . \par \par I personally performed the services described in the documentation, reviewed the documentation recorded by the scribe in my presence and it accurately and completely records my words and actions.\par \par \par \par \par \par Physician Attestation:\par Documented by VEE FIERRO acting as a scribe for RUSSELL LEON 08/10/2022 . \par                 All medical record entries made by the Scribe were at my, RUSSELL LEON , direction and personally dictated by me on 08/10/2022 . I have reviewed the chart and agree that the record accurately reflects my personal performance of the history, physical exam, assessment and plan\par

## 2023-01-18 NOTE — DISCHARGE NOTE PROVIDER - NSDCHHATTENDCERT_GEN_ALL_CORE
My signature below certifies that the above stated patient is homebound and upon completion of the Face-To-Face encounter, has the need for intermittent skilled nursing, physical therapy and/or speech or occupational therapy services in their home for their current diagnosis as outlined in their initial plan of care. These services will continue to be monitored by myself or another physician. Acitretin Pregnancy And Lactation Text: This medication is Pregnancy Category X and should not be given to women who are pregnant or may become pregnant in the future. This medication is excreted in breast milk.

## 2023-11-03 NOTE — ED PROVIDER NOTE - DISPOSITION TYPE
Called and let mom know that although insurance does not cover the cost of nutrition/dietitian visits, [facility] would help cover the costs. Mom vu and expressed thanks.   ADMIT

## 2025-03-05 NOTE — CONSULT NOTE ADULT - NSHPATTENDINGPLANDISCUSS_GEN_ALL_CORE
Monitor: The problem is stable.  Evaluation: Labs/tests ordered, see encounter summary.  Assessment/Treatment:  Continue current treatment/monitoring regimen. and Follow up per specialist managing the condition.   ER resident

## 2025-05-15 NOTE — ED ADULT NURSE NOTE - NSFALLRSKOUTCOME_ED_ALL_ED
May 15, 2025     Patient: Dony Estrada   YOB: 1967   Date of Visit: 5/15/2025       To Whom It May Concern:    It is my medical opinion that Dony Estrada may return to work on Saturday May 24, 2025 full duty without restrictions.           Sincerely,        TO Mancia       [employer]